# Patient Record
Sex: MALE | Race: BLACK OR AFRICAN AMERICAN | Employment: OTHER | URBAN - METROPOLITAN AREA
[De-identification: names, ages, dates, MRNs, and addresses within clinical notes are randomized per-mention and may not be internally consistent; named-entity substitution may affect disease eponyms.]

---

## 2017-04-12 ENCOUNTER — OFFICE VISIT (OUTPATIENT)
Dept: INTERNAL MEDICINE CLINIC | Age: 67
End: 2017-04-12

## 2017-04-12 VITALS
HEART RATE: 70 BPM | BODY MASS INDEX: 21.21 KG/M2 | WEIGHT: 132 LBS | HEIGHT: 66 IN | RESPIRATION RATE: 19 BRPM | TEMPERATURE: 98.2 F | DIASTOLIC BLOOD PRESSURE: 70 MMHG | OXYGEN SATURATION: 99 % | SYSTOLIC BLOOD PRESSURE: 130 MMHG

## 2017-04-12 DIAGNOSIS — I10 ESSENTIAL HYPERTENSION: ICD-10-CM

## 2017-04-12 DIAGNOSIS — Z12.11 COLON CANCER SCREENING: ICD-10-CM

## 2017-04-12 DIAGNOSIS — B20 HIV (HUMAN IMMUNODEFICIENCY VIRUS INFECTION) (HCC): ICD-10-CM

## 2017-04-12 DIAGNOSIS — G99.2 STENOSIS OF CERVICAL SPINE WITH MYELOPATHY (HCC): Primary | ICD-10-CM

## 2017-04-12 DIAGNOSIS — M48.02 STENOSIS OF CERVICAL SPINE WITH MYELOPATHY (HCC): Primary | ICD-10-CM

## 2017-04-12 LAB
CHOLEST SERPL-MCNC: 157 MG/DL
HDLC SERPL-MCNC: 78 MG/DL
LDL CHOLESTEROL POC: 64
NON-HDL GOAL (POC): 79
TCHOL/HDL RATIO (POC): 2
TRIGL SERPL-MCNC: 79 MG/DL

## 2017-04-12 RX ORDER — GABAPENTIN 300 MG/1
300 CAPSULE ORAL 3 TIMES DAILY
Qty: 90 CAP | Refills: 12 | Status: SHIPPED | OUTPATIENT
Start: 2017-04-12

## 2017-04-12 NOTE — MR AVS SNAPSHOT
Visit Information Date & Time Provider Department Dept. Phone Encounter #  
 4/12/2017 11:45 AM Jose Fox MD [facility] [phone] [id number] Follow-up Instructions Return in about 4 weeks (around 5/10/2017). Upcoming Health Maintenance Date Due  
 GLAUCOMA SCREENING Q2Y 11/22/2014 MEDICARE YEARLY EXAM 11/22/2014 INFLUENZA AGE 9 TO ADULT 8/1/2016 FOBT Q 1 YEAR AGE 50-75 5/6/2017 DTaP/Tdap/Td series (1 - Tdap) 4/26/2018* Pneumococcal 65+ High/Highest Risk (2 of 2 - PPSV23) 10/8/2017 *Topic was postponed. The date shown is not the original due date. Allergies as of 4/12/2017  Review Complete On: 4/12/2017 By: Jose Fox MD  
 No Known Allergies Current Immunizations  Reviewed on 12/5/2013 Name Date Influenza Vaccine 12/5/2013 Influenza Vaccine Split 10/8/2012 Pneumococcal Vaccine (Unspecified Type) 10/8/2012 Not reviewed this visit You Were Diagnosed With   
  
 Codes Comments Stenosis of cervical spine with myelopathy    -  Primary ICD-10-CM: M47.12 
ICD-9-CM: 721.1 HIV (human immunodeficiency virus infection) ([facility]     ICD-10-CM: M41 ICD-9-CM: V08 Essential hypertension     ICD-10-CM: I10 
ICD-9-CM: 401.9 Colon cancer screening     ICD-10-CM: Z12.11 ICD-9-CM: V76.51 Vitals BP Pulse Temp Resp Height(growth percentile) Weight(growth percentile) 130/70 70 98.2 °F (36.8 °C) (Oral) 19 5' 6\" (1.676 m) 132 lb (59.9 kg) SpO2 BMI Smoking Status 99% 21.31 kg/m2 Current Every Day Smoker BMI and BSA Data Body Mass Index Body Surface Area  
 21.31 kg/m 2 1.67 m 2 Preferred Pharmacy Pharmacy Name Phone [facility] [address] [facility] [phone] Your Updated Medication List  
  
   
This list is accurate as of: 4/12/17 12:46 PM.  Always use your most recent med list.  
  
  
  
  
 aspirin 81 mg chewable tablet Take 81 mg by mouth daily. b complex vitamins tablet Take 1 Tab by mouth daily. cholecalciferol 1,000 unit Cap Commonly known as:  VITAMIN D3 Take  by mouth daily. cyclobenzaprine 10 mg tablet Commonly known as:  FLEXERIL  
take 1 tablet by mouth twice a day if needed for muscle spasm FLUoxetine 10 mg capsule Commonly known as:  PROzac  
  
 fluPHENAZine decanoate 25 mg/mL injection Commonly known as:  PROLIXIN  
  
 * gabapentin 100 mg capsule Commonly known as:  NEURONTIN  
take 1 capsule by mouth three times a day * gabapentin 300 mg capsule Commonly known as:  NEURONTIN Take 1 Cap by mouth three (3) times daily. hydroCHLOROthiazide 25 mg tablet Commonly known as:  HYDRODIURIL  
take 1 tablet by mouth once daily  
  
 ibuprofen 800 mg tablet Commonly known as:  MOTRIN  
take 1 tablet by mouth twice a day with food  
  
 ondansetron 4 mg disintegrating tablet Commonly known as:  ZOFRAN ODT  
  
 oxyCODONE IR 5 mg immediate release tablet Commonly known as:  Nafisae [place] HC 2.5 % rectal cream  
Generic drug:  hydrocortisone  
insert rectally four times a day Senna 8.6 mg tablet Generic drug:  senna STOOL SOFTENER 100 mg capsule Generic drug:  docusate sodium * tamsulosin 0.4 mg capsule Commonly known as:  FLOMAX * tamsulosin 0.4 mg capsule Commonly known as:  FLOMAX Take 1 Cap by mouth daily. trihexyphenidyl 5 mg tablet Commonly known as:  ARTANE Take 5 mg by mouth three (3) times daily. * Elise Palma Commonly known as:  ULTRA-LIGHT ROLLATOR Use as directed daily * Elise Palma Commonly known as:  [address] Use as directed daily * Elise Palma Commonly known as:  [address] Use as needed * Notice:   This list has 7 medication(s) that are the same as other medications prescribed for you. Read the directions carefully, and ask your doctor or other care provider to review them with you. Prescriptions Sent to Pharmacy Refills  
 gabapentin (NEURONTIN) 300 mg capsule 12 Sig: Take 1 Cap by mouth three (3) times daily. Class: Normal  
 Pharmacy: [facility] [address] [place] [address] Drive  #: [phone] Route: Oral  
  
We Performed the Following AMB POC LIPID PROFILE [89604 CPT(R)] CBC W/O DIFF [88858 CPT(R)] METABOLIC PANEL, COMPREHENSIVE [07041 CPT(R)] OCCULT BLOOD, IMMUNOASSAY (FIT) [id number] CPT(R)] REFERRAL TO PHYSICAL THERAPY [VWB53 Custom] Follow-up Instructions Return in about 4 weeks (around 5/10/2017). Referral Information Referral ID Referred By Referred To  
  
 [id number] Jimenez MCDUFFIE Not Available Visits Status Start Date End Date 1 New Request 4/12/17 4/12/18 If your referral has a status of pending review or denied, additional information will be sent to support the outcome of this decision. Patient Instructions Click4Ride Activation Thank you for requesting access to Click4Ride. Please follow the instructions below to securely access and download your online medical record. Click4Ride allows you to send messages to your doctor, view your test results, renew your prescriptions, schedule appointments, and more. How Do I Sign Up? 1. In your internet browser, go to [email or web address] 
2. Click on the First Time User? Click Here link in the Sign In box. You will be redirect to the New Member Sign Up page. 3. Enter your Click4Ride Access Code exactly as it appears below. You will not need to use this code after youve completed the sign-up process. If you do not sign up before the expiration date, you must request a new code. Click4Ride Access Code: Activation code not generated Current Click4Ride Status: Patient Declined (This is the date your Click4Ride access code will ) 4. Enter the last four digits of your Social Security Number (xxxx) and Date of Birth (mm/dd/yyyy) as indicated and click Submit. You will be taken to the next sign-up page. 5. Create a Receptos ID. This will be your Receptos login ID and cannot be changed, so think of one that is secure and easy to remember. 6. Create a Receptos password. You can change your password at any time. 7. Enter your Password Reset Question and Answer. This can be used at a later time if you forget your password. 8. Enter your e-mail address. You will receive e-mail notification when new information is available in [address] 9. Click Sign Up. You can now view and download portions of your medical record. 10. Click the Download Summary menu link to download a portable copy of your medical information. Additional Information If you have questions, please visit the Frequently Asked Questions section of the Receptos website at [email or web address] Remember, Receptos is NOT to be used for urgent needs. For medical emergencies, dial 911. Please provide this summary of care documentation to your next provider. Your primary care clinician is listed as Thierry Davila. If you have any questions after today's visit, please call [phone].

## 2017-04-12 NOTE — PATIENT INSTRUCTIONS
[employer] Activation    Thank you for requesting access to [employer]. Please follow the instructions below to securely access and download your online medical record. Absolute [employer] allows you to send messages to your doctor, view your test results, renew your prescriptions, schedule appointments, and more. How Do I Sign Up? 1. In your internet browser, go to [email or web address]  2. Click on the First Time User? Click Here link in the Sign In box. You will be redirect to the New Member Sign Up page. 3. Enter your Absolute [employer] Access Code exactly as it appears below. You will not need to use this code after youve completed the sign-up process. If you do not sign up before the expiration date, you must request a new code. Absolute [employer]ody Access Code: Activation code not generated  Current Absolute [employer] Status: Patient Declined (This is the date your Absolute [employer]ody access code will )    4. Enter the last four digits of your Social Security Number (xxxx) and Date of Birth (mm/dd/yyyy) as indicated and click Submit. You will be taken to the next sign-up page. 5. Create a Absolute [employer]ody ID. This will be your Absolute [employer] login ID and cannot be changed, so think of one that is secure and easy to remember. 6. Create a Absolute [employer] password. You can change your password at any time. 7. Enter your Password Reset Question and Answer. This can be used at a later time if you forget your password. 8. Enter your e-mail address. You will receive e-mail notification when new information is available in [address] 9. Click Sign Up. You can now view and download portions of your medical record. 10. Click the Download Summary menu link to download a portable copy of your medical information. Additional Information    If you have questions, please visit the Frequently Asked Questions section of the Absolute Antibody website at [email or web address] Remember, Absolute [employer] is NOT to be used for urgent needs. For medical emergencies, dial 911.

## 2017-04-12 NOTE — PROGRESS NOTES
Izaiah Banda is a 78 y.o. male and presents with Hypertension; Gait Problem; and HIV  . Subjective:    Hypertension Review:  The patient has hypertension  Diet and Lifestyle: generally follows a  low sodium diet, exercises sporadically  Home BP Monitoring: is not measured at home. Pertinent ROS: taking medications as instructed, no medication side effects noted, no TIA's, no chest pain on exertion, no dyspnea on exertion, no swelling of ankles. He recently had cervical disc surgery    He has a history of hiv. Review of Systems  Constitutional: negative for fevers, chills, anorexia and weight loss  Eyes:   negative for visual disturbance and irritation  ENT:   negative for tinnitus,sore throat,nasal congestion,ear pains. hoarseness  Respiratory:  negative for cough, hemoptysis, dyspnea,wheezing  CV:   negative for chest pain, palpitations, lower extremity edema  GI:   negative for nausea, vomiting, diarrhea, abdominal pain,melena  Endo:               negative for polyuria,polydipsia,polyphagia,heat intolerance  Genitourinary: negative for frequency, dysuria and hematuria  Integument:  negative for rash and pruritus  Hematologic:  negative for easy bruising and gum/nose bleeding  Musculoskel: myalgias, arthralgias,  joint pain  Neurological:  negative for headaches, dizziness, vertigo, memory problems and gait   Behavl/Psych: negative for feelings of anxiety, depression, mood changes    Past Medical History:   Diagnosis Date    Chronic hepatitis C without mention of hepatic coma 4/7/2011    Degenerative Joint Disease 7/13/2011    Hematochezia 4/7/2011    [facility] 4/7/2011    Hypetension 4/7/2011    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 arm surgery due to abscess    HX OTHER SURGICAL      flank mole removal     Social History     Social History    Marital status: SINGLE     Spouse name: N/A    Number of children: N/A    Years of education: N/A     Social History Main Topics    Smoking status: Current Every Day Smoker     Packs/day: 0.25    Smokeless tobacco: Never Used    Alcohol use No    Drug use: No    Sexual activity: Not Asked     Other Topics Concern    None     Social History Narrative     Family History   Problem Relation Age of Onset    Kidney Disease Mother     Diabetes Mother     No Known Problems Father     Heart Disease Sister     Kidney Disease Sister     Diabetes Brother     Heart Disease Brother     Cancer Brother     Alcohol abuse Brother     Obesity Brother     Other Brother      Drugs     Current Outpatient Prescriptions   Medication Sig Dispense Refill    gabapentin (NEURONTIN) 300 mg capsule Take 1 Cap by mouth three (3) times daily. 90 Cap 12    tamsulosin (FLOMAX) 0.4 mg capsule       SENNA 8.6 mg tablet       fluPHENAZine decanoate (PROLIXIN) 25 mg/mL injection       cyclobenzaprine (FLEXERIL) 10 mg tablet take 1 tablet by mouth twice a day if needed for muscle spasm 90 Tab 3    Walker (MAYURI ROLLING WALKER) misc Use as needed 1 Each 0    ibuprofen (MOTRIN) 800 mg tablet take 1 tablet by mouth twice a day with food 60 Tab 12    gabapentin (NEURONTIN) 100 mg capsule take 1 capsule by mouth three times a day 90 Cap 12    PROCTOSOL HC 2.5 % rectal cream insert rectally four times a day 28.35 g 12    hydrochlorothiazide (HYDRODIURIL) 25 mg tablet take 1 tablet by mouth once daily 30 Tab 12    Walker (MAYURI ROLLING WALKER) misc Use as directed daily 1 Each 0    Walker (ULTRA-LIGHT ROLLATOR) misc Use as directed daily 1 Each 0    b complex vitamins tablet Take 1 Tab by mouth daily.  cholecalciferol (VITAMIN D3) 1,000 unit cap Take  by mouth daily.       FLUoxetine (PROZAC) 10 mg capsule   0    trihexyphenidyl (ARTANE) 5 mg tablet Take 5 mg by mouth three (3) times daily.  STOOL SOFTENER 100 mg capsule       oxyCODONE IR (ROXICODONE) 5 mg immediate release tablet       ondansetron (ZOFRAN ODT) 4 mg disintegrating tablet       tamsulosin (FLOMAX) 0.4 mg capsule Take 1 Cap by mouth daily. 30 Cap 12    aspirin 81 mg chewable tablet Take 81 mg by mouth daily. No Known Allergies    Objective:  Visit Vitals    /70    Pulse 70    Temp 98.2 °F (36.8 °C) (Oral)    Resp 19    Ht 5' 6\" (1.676 m)    Wt 132 lb (59.9 kg)    SpO2 99%    BMI 21.31 kg/m2     Physical Exam:   General appearance - alert, well appearing, and in no distress  Mental status - alert, oriented to person, place, and time  EYE-SHANNAN, EOMI, corneas normal, no foreign bodies  ENT-ENT exam normal, no neck nodes or sinus tenderness  Nose - normal and patent, no erythema, discharge or polyps  Mouth - mucous membranes moist, pharynx normal without lesions  Neck - supple, no significant adenopathy   Chest - clear to auscultation, no wheezes, rales or rhonchi, symmetric air entry   Heart - normal rate, regular rhythm, normal S1, S2, no murmurs, rubs, clicks or gallops   Abdomen - soft, nontender, nondistended, no masses or organomegaly  Lymph- no adenopathy palpable  Ext-peripheral pulses normal, no pedal edema, no clubbing or cyanosis  Skin-Warm and dry.  no hyperpigmentation, vitiligo, or suspicious lesions  Neuro -alert, oriented, normal speech, no focal findings or movement disorder noted  Neck-normal C-spine, no tenderness, full ROM without pain      Results for orders placed or performed in visit on 05/06/16   OCCULT BLOOD, STOOL   Result Value Ref Range    Occult Blood fecal, by IA Positive (A) Negative   AMB POC LIPID PROFILE   Result Value Ref Range    Cholesterol (POC) 164     Triglycerides (POC) 114     HDL Cholesterol (POC) 67     LDL Cholesterol (POC) 75     Non-HDL Goal (POC) 97     TChol/HDL Ratio (POC) 2.5        Assessment/Plan:    ICD-10-CM ICD-9-CM    1. Stenosis of cervical spine with myelopathy M47.12 721.1 REFERRAL TO PHYSICAL THERAPY   2. HIV (human immunodeficiency virus infection) ([facility] Z21 V08    3. Essential hypertension I10 401.9 CBC W/O DIFF      METABOLIC PANEL, COMPREHENSIVE      AMB POC LIPID PROFILE   4. Colon cancer screening Z12.11 V76.51 OCCULT BLOOD, IMMUNOASSAY (FIT)     Orders Placed This Encounter    OCCULT BLOOD, IMMUNOASSAY (FIT)    CBC W/O DIFF    METABOLIC PANEL, COMPREHENSIVE    REFERRAL TO PHYSICAL THERAPY     Referral Priority:   Routine     Referral Type:   PT/OT/ST     Referral Reason:   Specialty Services Required    AMB POC LIPID PROFILE    gabapentin (NEURONTIN) 300 mg capsule     Sig: Take 1 Cap by mouth three (3) times daily. Dispense:  90 Cap     Refill:  12     lose weight, increase physical activity, follow low fat diet, follow low salt diet,Take 81mg aspirin daily  Patient Instructions   [employer] Activation    Thank you for requesting access to [employer]. Please follow the instructions below to securely access and download your online medical record. [employer] allows you to send messages to your doctor, view your test results, renew your prescriptions, schedule appointments, and more. How Do I Sign Up? 1. In your internet browser, go to [email or web address]  2. Click on the First Time User? Click Here link in the Sign In box. You will be redirect to the New Member Sign Up page. 3. Enter your [employer] Access Code exactly as it appears below. You will not need to use this code after youve completed the sign-up process. If you do not sign up before the expiration date, you must request a new code. [employer] Access Code: Activation code not generated  Current [employer] Status: Patient Declined (This is the date your [employer] access code will )    4.  Enter the last four digits of your Social Security Number (xxxx) and Date of Birth (mm/dd/yyyy) as indicated and click Submit. You will be taken to the next sign-up page. 5. Create a [employer] ID. This will be your [employer] login ID and cannot be changed, so think of one that is secure and easy to remember. 6. Create a [employer] password. You can change your password at any time. 7. Enter your Password Reset Question and Answer. This can be used at a later time if you forget your password. 8. Enter your e-mail address. You will receive e-mail notification when new information is available in [address] 9. Click Sign Up. You can now view and download portions of your medical record. 10. Click the Download Summary menu link to download a portable copy of your medical information. Additional Information    If you have questions, please visit the Frequently Asked Questions section of the [employer] website at [email or web address] Remember, [employer] is NOT to be used for urgent needs. For medical emergencies, dial 911. Follow-up Disposition:  Return in about 4 weeks (around 5/10/2017). I have reviewed with the patient details of the assessment and plan and all questions were answered. Relevent patient education was performed    An After Visit Summary was printed and given to the patient.

## 2017-04-13 LAB
ALBUMIN SERPL-MCNC: 3.9 G/DL (ref 3.6–4.8)
ALBUMIN/GLOB SERPL: 1.2 {RATIO} (ref 1.2–2.2)
ALP SERPL-CCNC: 79 IU/L (ref 39–117)
ALT SERPL-CCNC: 29 IU/L (ref 0–44)
AST SERPL-CCNC: 33 IU/L (ref 0–40)
BILIRUB SERPL-MCNC: 0.3 MG/DL (ref 0–1.2)
BUN SERPL-MCNC: 10 MG/DL (ref 8–27)
BUN/CREAT SERPL: 12 (ref 10–24)
CALCIUM SERPL-MCNC: 9.6 MG/DL (ref 8.6–10.2)
CHLORIDE SERPL-SCNC: 99 MMOL/L (ref 96–106)
CO2 SERPL-SCNC: 28 MMOL/L (ref 18–29)
CREAT SERPL-MCNC: 0.81 MG/DL (ref 0.76–1.27)
ERYTHROCYTE [DISTWIDTH] IN BLOOD BY AUTOMATED COUNT: 13.9 % (ref 12.3–15.4)
GLOBULIN SER CALC-MCNC: 3.2 G/DL (ref 1.5–4.5)
GLUCOSE SERPL-MCNC: 89 MG/DL (ref 65–99)
HCT VFR BLD AUTO: 41.5 % (ref 37.5–51)
HGB BLD-MCNC: 14.1 G/DL (ref 12.6–17.7)
MCH RBC QN AUTO: 30.3 PG (ref 26.6–33)
MCHC RBC AUTO-ENTMCNC: 34 G/DL (ref 31.5–35.7)
MCV RBC AUTO: 89 FL (ref 79–97)
NRBC BLD AUTO-RTO: 0 %
PLATELET # BLD AUTO: 304 X10E3/UL (ref 150–379)
POTASSIUM SERPL-SCNC: 4.9 MMOL/L (ref 3.5–5.2)
PROT SERPL-MCNC: 7.1 G/DL (ref 6–8.5)
RBC # BLD AUTO: 4.66 X10E6/UL (ref 4.14–5.8)
SODIUM SERPL-SCNC: 141 MMOL/L (ref 134–144)
WBC # BLD AUTO: 4.1 X10E3/UL (ref 3.4–10.8)

## 2017-04-19 ENCOUNTER — HOSPITAL ENCOUNTER (OUTPATIENT)
Dept: PHYSICAL THERAPY | Age: 67
Discharge: HOME | End: 2017-04-19
Payer: MEDICAID

## 2017-04-19 PROCEDURE — G8979 MOBILITY GOAL STATUS: HCPCS

## 2017-04-19 PROCEDURE — 97110 THERAPEUTIC EXERCISES: CPT

## 2017-04-19 PROCEDURE — 97161 PT EVAL LOW COMPLEX 20 MIN: CPT

## 2017-04-19 PROCEDURE — G8978 MOBILITY CURRENT STATUS: HCPCS

## 2017-04-19 NOTE — PROGRESS NOTES
[facility]  [address], [address] [facility], [place]    OUTPATIENT physical Therapy  []      Initial Evaluation []     30 day/10th visit progress note []     90 day/re-certification    NAME: Roby Salamanca AGE: 78 y.oMerissa GENDER: male  DATE: 4/19/2017  REFERRING PHYSICIAN: Deborah Barkley MD    OBJECTIVE DATA SUMMARY:   Medical Diagnosis: Cx stenosis with myelopathy  PT Diagnosis: Decreased ROM, LE weakness  Date of Onset: ongoing  Mechanism of Injury/Chief Complaint:  No recent injury  Present Symptoms:Cervical pain, limited motion. Complaints of falling  Functional Deficits and Limitations:   []     Sitting:   []    Dressing:   []    Reaching:  []     Standing:   []     Bathing:   []    Lifting:  [x]     Walking:   []     Cooking:   []    Yardwork:  []     Sleeping:   []     Cleaning:   []     Driving:  []     Work Tasks:  []     Recreation:   []    Other:    HISTORY:  Past Medical History:   Past Medical History:   Diagnosis Date    Chronic hepatitis C without mention of hepatic coma 4/7/2011    Degenerative Joint Disease 7/13/2011    Hematochezia 4/7/2011    HIV (human immunodeficiency virus infection) ([facility] [address] 4/7/2011    Hypetension 4/7/2011    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  repots that pt had cx surgery in November at [facility]  Will bring paper work about what his surgery entailed, does not think he had fusion  Patients goals for therapy: less neck pain, fewer falls    OBJECTIVE DATA SUMMARY:   EXAMINATION/PRESENTATION/DECISION MAKING:   Pain:  Location: neck and upper back  Quality: sharp  Now: 10/10  Best:  Worst:  Factors that improve pain: rest    Posture:   Pt with flexed trunk    Strength:   B/l LE 3/5    Range of Motion:   Cx ROM limited to 75%, flex/ext and rotation    Spinal Assessment:   Flattened lumbar spine      Joint Mobility:   Cx mobility not assessed will await dr Irma Yang on surgery performed    Palpation:   TTP b/l UT, rhomboids, levator, cx paraspinals    Neurologic Assessment:   Tone: increased LE tone   Sensation: WFL   Reflexes: not tested    Special Tests:   None today    Mobility:   Transitional Movements: unsteady    Gait: Pt ambulates with walker, scissor gait with toe walking    Balance:  Poor, improved with r/w    Functional Measure: In compliance with CMSs Claims Based Outcome Reporting, the following G-code set was chosen for this patient based on their primary functional limitation being treated: The outcome measure chosen to determine the severity of the functional limitation was the LEFS with a score of 33/80 which was correlated with the impairment scale.     ? Mobility - Walking and Moving Around:     - CURRENT STATUS: CK - 40%-59% impaired, limited or restricted    - GOAL STATUS: CJ - 20%-39% impaired, limited or restricted    - D/C STATUS:  ---------------To be determined---------------      Physical Therapy Evaluation Charge Determination   History Examination Presentation Decision-Making   MEDIUM  Complexity : 1-2 comorbidities / personal factors will impact the outcome/ POC  LOW Complexity : 1-2 Standardized tests and measures addressing body structure, function, activity limitation and / or participation in recreation  LOW Complexity : Stable, uncomplicated  LOW Complexity : FOTO score of       Based on the above components, the patient evaluation is determined to be of the following complexity level: LOW     TREATMENT/INTERVENTION:  Modalities (Rationale): MHP post treatment to neck to decrease pain and muscle guarding      Therapeutic Exercises:  HEP  Isometric Cx extension  Cx rotation stretch  Shoulder blade squeeze  Hip abduction  SLR  Shallow knee bends in walker  Heel/toe raises    Manual Therapy:  STM Cx paraspinals and UT    Neuro Re-Education:  Discussed balance challenges to decrease falls    Balance Training:  EC NBOS, perturbations  SLS x 5 sec alternating, 1 hand for balance  EC SLS    Ambulation/Gait Training:  None today, future sessions focus on giat and balance challenge    Activity tolerance and post treatment pain report:   Fair tolerance, fatigues quickly  Education:  []     Home exercise program provided. Education was provided to the patient on the following topics: importance of doing exercises x2 per day. Patient verbalized understanding of the topics presented. ASSESSMENT:   Nette Perez is a 78 y.o. male who presents with poor balance and Cx pain. Physical therapy problems based on objective data include: decrease ROM, decrease strength, decrease ADL/ functional abilitiies and decrease activity tolerance . Patient will benefit from skilled intervention to address these impairments. Rehabilitation potential is considered to be Fair. Factors which may influence rehabilitation potential include chronicity of problem . Patient will benefit from 10 physical therapy visits over 8 weeks to optimize improvement in these areas.     PLAN OF CARE:   Recommendations and Planned Interventions:  [x]     Therapeutic Activities  [x]     Heat/Ice  [x]     Therapeutic Exercises  []     Ultrasound  []     Gait training  [x]     E-stim  [x]     Balance training  [x]     Home exercise program  []     Manual Therapy  []     TENS  [x]     Neuro Re-Ed  []     Edema management  [x]     Posture/Biomechanics  [x]     Pain management  []     Traction  []     Other:    Frequency/Duration:  Patient will be followed by physical therapy 1 time a week for 8 weeks to address goals. GOALS  Short term goals  Time frame: 5 weeks  1. Patient will be compliance and independent with the initial HEP as evidenced by being able to perform without cuing. 2. Patient tolerate 15 minutes of clinic exercise. 3. Report of fewer than 1 fall / week. 4. Pt will report decrease in neck pain 4/10  Long term goals  Time frame: 10 weeks  1. Patient will reports pain level decreased to 2/10 to allow increased ease of movement. 2. Patient will have an improved score on the LEFS outcome measure by 10 points to demonstrate an increase in functional activity tolerance. 3. Patient will be independent in final individualized HEP. 4.Pt  report of fewer than 1 fall/ month       [x]     Patient has participated in goal setting and agrees to work toward plan of care. []     Patient was instructed to call if questions or concerns arise. Thank you for this referral.  Ally Painter, PT       Patient Time in clinic:          TREATMENT PLAN EFFECTIVE DATES:   4/19/2017 TO 6/21/2017  I have read the above plan of care for Maverick Jacob and certify the need for skilled physical therapy services.     Physician Signature: ____________________________________________________    Date: _________________________________________________________________

## 2017-04-26 ENCOUNTER — HOSPITAL ENCOUNTER (OUTPATIENT)
Dept: PHYSICAL THERAPY | Age: 67
Discharge: HOME | End: 2017-04-26
Payer: MEDICAID

## 2017-04-26 PROCEDURE — 97110 THERAPEUTIC EXERCISES: CPT

## 2017-04-26 PROCEDURE — 97112 NEUROMUSCULAR REEDUCATION: CPT

## 2017-04-26 NOTE — PROGRESS NOTES
[facility]  [address], [address] [facility]    OUTPATIENT physical Therapy DAILY Treatment NOTe  Visit: 2    NAME: Roby Salamanca AGE: 78 y.o. GENDER: male  DATE: 4/26/2017  REFERRING PHYSICIAN: Deborah Barkley MD          GOALS  Short term goals  Time frame: 5 weeks  1. Patient will be compliance and independent with the initial HEP as evidenced by being able to perform without cuing. 2. Patient tolerate 15 minutes of clinic exercise. 3. Report of fewer than 1 fall / week. 4. Pt will report decrease in neck pain 4/10  Long term goals  Time frame: 10 weeks  1. Patient will reports pain level decreased to 2/10 to allow increased ease of movement. 2. Patient will have an improved score on the LEFS outcome measure by 10 points to demonstrate an increase in functional activity tolerance. 3. Patient will be independent in final individualized HEP. 4.Pt  report of fewer than 1 fall/ month                  SUBJECTIVE:   Pt reports no neck pain. No falls but continues to wait for his walker. OBJECTIVE DATA SUMMARY:     Pain:  Location: neck and upper back  Quality: sharp  Now: 10/10  Best:  Worst:  Factors that improve pain: rest    Posture:   Pt with flexed trunk    Strength:   B/l LE 3/5    Range of Motion:   Cx ROM limited to 75%, flex/ext and rotation    Spinal Assessment:   Flattened lumbar spine      Joint Mobility:   Cx mobility not assessed will await dr boston on surgery performed    Palpation:   TTP b/l UT, rhomboids, levator, cx paraspinals    Neurologic Assessment:   Tone: increased LE tone   Sensation: WFL   Reflexes: not tested    Special Tests:   None today    Mobility:   Transitional Movements: unsteady    Gait: Pt ambulates with walker, scissor gait with toe walking    Balance:  Poor, improved with r/w    Functional Measure:      In compliance with CMSs Claims Based Outcome Reporting, the following G-code set was chosen for this patient based on their primary functional limitation being treated: The outcome measure chosen to determine the severity of the functional limitation was the LEFS with a score of 33/80 which was correlated with the impairment scale. ? Mobility - Walking and Moving Around:     - CURRENT STATUS: CK - 40%-59% impaired, limited or restricted    - GOAL STATUS: CJ - 20%-39% impaired, limited or restricted    - D/C STATUS:  ---------------To be determined---------------      Physical Therapy Evaluation Charge Determination   History Examination Presentation Decision-Making   MEDIUM  Complexity : 1-2 comorbidities / personal factors will impact the outcome/ POC  LOW Complexity : 1-2 Standardized tests and measures addressing body structure, function, activity limitation and / or participation in recreation  LOW Complexity : Stable, uncomplicated  LOW Complexity : FOTO score of       Based on the above components, the patient evaluation is determined to be of the following complexity level: LOW     TREATMENT/INTERVENTION:  Modalities (Rationale): MHP post treatment to neck to decrease pain and muscle guarding      Therapeutic Exercises:  HEP  Isometric Cx extension  Cx rotation stretch  Shoulder blade squeeze  Hip abduction  SLR  Shallow knee bends in walker  Heel/toe raises    Additional Clinic activities    Steamboats  Shallow knee bends    Seated   LAQ x 10 reps with 2#  Marching in place 2#    Pull-downs at [employer] x 10 reps 15#  Pull backs                    X 10         15#      Manual Therapy:  STM Cx paraspinals and UT    Neuro Re-Education:  Discussed balance challenges to decrease falls    Balance Training:  EC NBOS, perturbations  SLS x 5 sec alternating, 1 hand for balance  EC SLS    SLS on green foam    Ambulation/Gait Training:  Pt ambulated, emphasized keeping r/w close to him,     Activity tolerance and post treatment pain report:    Tolerated fair, fatigued at end of session    Education:  Education was provided to the patient on the following topics: Safe use of r/w. [x]    No changes were made to the home exercise program.  []    The following changes were made to the home exercise program:   Patient verbalized understanding of the topics presented. ASSESSMENT:   Pt returns today following IE. He reports no neck pain and his ROM is WNL. Pt post cervical discotomy in November. Continued to focus on exercises for UE and LE strengthening following surgery as well as neuromuscular and balance challenges. Patients progression toward goals is as follows:  []     Improving appropriately and progressing toward goals  [x]     Improving slowly and progressing toward goals  []     Not making progress toward goals and plan of care will be adjusted    PLAN OF CARE:   Patient continues to benefit from skilled intervention to address the above impairments. [x]    Continue treatment per established plan of care.   []     Recommend the following changes to the plan of care:     Recommendations/Intent for next treatment: Increase neuromuscular challenges Proximal    Mirella Labrum, PT     Patient Time in clinic:

## 2017-05-05 ENCOUNTER — HOSPITAL ENCOUNTER (OUTPATIENT)
Dept: PHYSICAL THERAPY | Age: 67
Discharge: HOME | End: 2017-05-05
Payer: MEDICAID

## 2017-05-05 PROCEDURE — 97110 THERAPEUTIC EXERCISES: CPT | Performed by: PHYSICAL THERAPIST

## 2017-05-05 NOTE — PROGRESS NOTES
[facility]  [address], [address] [facility]    OUTPATIENT physical Therapy DAILY Treatment NOTe  Visit: 3    NAME: Win Gramajo AGE: 78 y.o. GENDER: male  DATE: 5/5/2017  REFERRING PHYSICIAN: Lorraine Johnson MD        GOALS  Short term goals  Time frame: 5 weeks  1. Patient will be compliance and independent with the initial HEP as evidenced by being able to perform without cuing. 2. Patient tolerate 15 minutes of clinic exercise. 3. Report of fewer than 1 fall / week. 4. Patient will report decrease in neck pain 4/10    Long term goals  Time frame: 10 weeks  1. Patient will reports pain level decreased to 2/10 to allow increased ease of movement. 2. Patient will have an improved score on the LEFS outcome measure by 10 points to demonstrate an increase in functional activity tolerance. 3. Patient will be independent in final individualized HEP. 4.Patient will report fewer than 1 fall/ month            SUBJECTIVE:   \"My neck is hurting today. It always hurts more when it's raining. \"    Patient reports moderate pain in neck (unable to provide number) and fall earlier this week.     OBJECTIVE DATA SUMMARY:   Objective data from initial evaluation:  Pain:  Location: neck and upper back  Quality: sharp  Now: 10/10  Factors that improve pain: rest    Posture:   Pt with flexed trunk    Strength:   B/l LE 3/5    Range of Motion:   Cx ROM limited to 75%, flex/ext and rotation    Spinal Assessment:   Flattened lumbar spine    Joint Mobility:   Cx mobility not assessed will await dr boston on surgery performed    Palpation:   TTP b/l UT, rhomboids, levator, cx paraspinals    Neurologic Assessment:   Tone: increased LE tone   Sensation: WFL   Reflexes: not tested    Mobility:   Transitional Movements: unsteady    Gait: Pt ambulates with walker, scissor gait with toe walking    Balance:  Poor, improved with r/w    Functional Measure:   LEFS: 33/80 TREATMENT/INTERVENTION:  Modalities (Rationale):  to decrease pain and muscle guarding  MHP to cervical spine for 10 minutes post treatment     Therapeutic Exercises:  HEP provided on evaluation:  Isometric Cx extension, Cx rotation stretch, Shoulder blade squeeze,   Hip abduction ,SLR, Shallow knee bends in walker, Heel/toe raises    Exercises in BOLD performed this date:    Standing:  Steamboats (hip abduction and extension): 10 reps B  Marches: 10 reps B  Mini squats: 10 reps    Supine:   SLR: 10 reps B  Chin tucks: 10 reps     Seated:   LAQ x 10 reps with 2#  Marching in place 2#  C/s SB with gentle overpressure: 3 reps with 15 second holds  Pec stretch (manual): 3 reps with 10 second holds    Pull-downs at Quantum 15#: 10 reps  Pull backs 15#: 10 reps    Manual Therapy:  Gentle STM to c/s paraspinals and UT     Neuro Re-Education:  Discussed balance challenges to decrease falls    Balance Training:  EC NBOS, perturbations  SLS x 5 sec alternating, 1 hand for balance  EC SLS    SLS on green foam    Ambulation/Gait Training:  Pt ambulated, emphasized keeping r/w close to him,      Activity tolerance and post treatment pain report: Tolerated fair, fatigued at end of session     Education:  Education was provided to the patient on the following topics: Safe use of r/w. [x]    No changes were made to the home exercise program.  []    The following changes were made to the home exercise program:   Patient verbalized understanding of the topics presented. ASSESSMENT:   Patient presents with moderate pain in neck this date. Patient with tenderness to palpation at UT and rhomboids. Patient s/p cervical discotomy in November 2016. Patient presents with new rolling walker this date; patient reports fall earlier this week. Patient with increased fatigue during today's session which limited exercises.      Patients progression toward goals is as follows:  []     Improving appropriately and progressing toward goals  [x]     Improving slowly and progressing toward goals  []     Not making progress toward goals and plan of care will be adjusted    PLAN OF CARE:   Patient continues to benefit from skilled intervention to address the above impairments. [x]    Continue treatment per established plan of care.   []     Recommend the following changes to the plan of care:     Recommendations/Intent for next treatment: Increase neuromuscular challenges     Marya Pisano, PT   Time Calculation: 35 mins  Patient Time in clinic:   Start Time: 1048   Stop Time: [id number]

## 2017-05-10 ENCOUNTER — HOSPITAL ENCOUNTER (OUTPATIENT)
Dept: PHYSICAL THERAPY | Age: 67
Discharge: HOME | End: 2017-05-10
Payer: MEDICAID

## 2017-05-10 PROCEDURE — 97110 THERAPEUTIC EXERCISES: CPT

## 2017-05-10 PROCEDURE — 97112 NEUROMUSCULAR REEDUCATION: CPT

## 2017-05-10 NOTE — PROGRESS NOTES
[facility]  [address], [address] [facility]    OUTPATIENT physical Therapy DAILY Treatment NOTe  Visit: 4    NAME: Abbe Clements AGE: 78 y.o. GENDER: male  DATE: 5/10/2017  REFERRING PHYSICIAN: Garret Dueñas MD        GOALS  Short term goals  Time frame: 5 weeks  1. Patient will be compliance and independent with the initial HEP as evidenced by being able to perform without cuing. 2. Patient tolerate 15 minutes of clinic exercise. 3. Report of fewer than 1 fall / week. 4. Patient will report decrease in neck pain 4/10    Long term goals  Time frame: 10 weeks  1. Patient will reports pain level decreased to 2/10 to allow increased ease of movement. 2. Patient will have an improved score on the LEFS outcome measure by 10 points to demonstrate an increase in functional activity tolerance. 3. Patient will be independent in final individualized HEP. 4.Patient will report fewer than 1 fall/ month            SUBJECTIVE:   \"My neck is hurting today. It always hurts more when it's raining. \"    Patient reports moderate pain in neck (unable to provide number) and fall earlier this week.     OBJECTIVE DATA SUMMARY:   Objective data from initial evaluation:  Pain:  Location: neck and upper back  Quality: sharp  Now: 10/10  Factors that improve pain: rest    Posture:   Pt with flexed trunk    Strength:   B/l LE 3/5    Range of Motion:   Cx ROM limited to 75%, flex/ext and rotation    Spinal Assessment:   Flattened lumbar spine    Joint Mobility:   Cx mobility not assessed will await dr boston on surgery performed    Palpation:   TTP b/l UT, rhomboids, levator, cx paraspinals    Neurologic Assessment:   Tone: increased LE tone   Sensation: WFL   Reflexes: not tested    Mobility:   Transitional Movements: unsteady    Gait: Pt ambulates with walker, scissor gait with toe walking    Balance:  Poor, improved with r/w    Functional Measure:   LEFS: 33/80 TREATMENT/INTERVENTION:  Modalities (Rationale):  to decrease pain and muscle guarding  MHP to cervical spine for 10 minutes post treatment     Therapeutic Exercises:  HEP provided on evaluation:  Isometric Cx extension, Cx rotation stretch, Shoulder blade squeeze,   Hip abduction ,SLR, Shallow knee bends in walker, Heel/toe raises    Exercises in BOLD performed this date:    Standing:  Steamboats (hip abduction and extension): 10 reps B  Marches: 10 reps B  Mini squats: 10 reps  Toe raise/heel raise    Supine:   SLR: 10 reps B  Chin tucks: 10 reps   BKTC on yellow theraball  HS passive x 5 reps  Clamshells x 10 reps 2 sets    Seated:   LAQ x 10 reps with 2#  Marching in place 2#  Adductor squeeze x 10 reps 2 sets  Abduction against red TB x 20 reps  Toe taps on cones x 10 reps    C/s SB with gentle overpressure: 3 reps with 15 second holds  Pec stretch (manual): 3 reps with 10 second holds    Pull-downs at [employer] 15#: 10 reps  Pull backs 15#: 10 reps    Manual Therapy:  Gentle STM to c/s paraspinals and UT     Neuro Re-Education:  Discussed balance challenges to decrease falls    Balance Training:  EC NBOS, perturbations  SLS x 5 sec alternating, 1 hand for balance  EC SLS    SLS on green foam    Ambulation/Gait Training:  Pt ambulated, emphasized keeping r/w close to him,      Activity tolerance and post treatment pain report: Tolerated fair, fatigued at end of session     Education:  Education was provided to the patient on the following topics: Safe use of r/w. [x]    No changes were made to the home exercise program.  []    The following changes were made to the home exercise program:   Patient verbalized understanding of the topics presented. ASSESSMENT:    Patient with tenderness to palpation at UT and rhomboids. Patient s/p cervical discotomy in November 2016. Patient presents with new rolling walker this date, however reports a fall earlier this week.  Progressed balance and clinic exercises today with good tolerance. Patients progression toward goals is as follows:  []     Improving appropriately and progressing toward goals  [x]     Improving slowly and progressing toward goals  []     Not making progress toward goals and plan of care will be adjusted    PLAN OF CARE:   Patient continues to benefit from skilled intervention to address the above impairments. [x]    Continue treatment per established plan of care.   []     Recommend the following changes to the plan of care:     Recommendations/Intent for next treatment: Increase neuromuscular challenges     Ramy Palafox, PT   Time Calculation: 55 mins  Patient Time in clinic:   Start Time: 7293   Stop Time: [id number]

## 2017-05-17 ENCOUNTER — HOSPITAL ENCOUNTER (OUTPATIENT)
Dept: PHYSICAL THERAPY | Age: 67
Discharge: HOME | End: 2017-05-17
Payer: MEDICAID

## 2017-05-17 LAB
HEMOCCULT STL QL IA: NEGATIVE
PLEASE NOTE:, 188601: NORMAL

## 2017-05-17 PROCEDURE — 97110 THERAPEUTIC EXERCISES: CPT

## 2017-05-17 PROCEDURE — 97116 GAIT TRAINING THERAPY: CPT

## 2017-05-17 NOTE — PROGRESS NOTES
[facility]  [address], [address] [facility]    OUTPATIENT physical Therapy DAILY Treatment NOTe  Visit: 5    NAME: Nette Perez AGE: 78 y.o. GENDER: male  DATE: 5/17/2017  REFERRING PHYSICIAN: Finn Negrete MD        GOALS  Short term goals  Time frame: 5 weeks  1. Patient will be compliance and independent with the initial HEP as evidenced by being able to perform without cuing. 2. Patient tolerate 15 minutes of clinic exercise. 3. Report of fewer than 1 fall / week. 4. Patient will report decrease in neck pain 4/10    Long term goals  Time frame: 10 weeks  1. Patient will reports pain level decreased to 2/10 to allow increased ease of movement. 2. Patient will have an improved score on the LEFS outcome measure by 10 points to demonstrate an increase in functional activity tolerance. 3. Patient will be independent in final individualized HEP.   4.Patient will report fewer than 1 fall/ month            SUBJECTIVE:   \"Pt reports falling' I landed on my bottom, I didn't hit my head\"        OBJECTIVE DATA SUMMARY:   Objective data from initial evaluation:  Pain:  Location: neck and upper back  Quality: sharp  Now: 10/10  Factors that improve pain: rest    Posture:   Pt with flexed trunk    Strength:   B/l LE 3/5    Range of Motion:   Cx ROM limited to 75%, flex/ext and rotation    Spinal Assessment:   Flattened lumbar spine    Joint Mobility:   Cx mobility not assessed will await dr Edwin Garibay on surgery performed    Palpation:   TTP b/l UT, rhomboids, levator, cx paraspinals    Neurologic Assessment:   Tone: increased LE tone   Sensation: WFL   Reflexes: not tested    Mobility:   Transitional Movements: unsteady    Gait: Pt ambulates with walker, scissor gait with toe walking    Balance:  Poor, improved with r/w    Functional Measure:   LEFS: 33/80     TREATMENT/INTERVENTION:  Modalities (Rationale):  to decrease pain and muscle guarding  MHP to cervical spine for 10 minutes post treatment     Therapeutic Exercises:  HEP provided on evaluation:  Isometric Cx extension, Cx rotation stretch, Shoulder blade squeeze,   Hip abduction ,SLR, Shallow knee bends in walker, Heel/toe raises    Exercises in BOLD performed this date:    Standing:  Steamboats (hip abduction and extension): 10 reps B  Marches: 10 reps B  Mini squats: 10 reps  Toe raise/heel raise    Supine:   SLR: 10 reps B  Chin tucks: 10 reps   BKTC on yellow theraball  HS passive x 5 reps  Clamshells x 10 reps 2 sets    Seated:   LAQ x 10 reps with 2#  Marching in place 2#  Adductor squeeze x 10 reps 2 sets  Abduction against red TB x 20 reps  Toe taps on cones x 10 reps    C/s SB with gentle overpressure: 3 reps with 15 second holds  Pec stretch (manual): 3 reps with 10 second holds    Pull-downs at Quantum 15#: 10 reps  Pull backs 15#: 10 reps    Manual Therapy:  Gentle STM to c/s paraspinals and UT     Neuro Re-Education:  Discussed balance challenges to decrease falls    Balance Training:  EC NBOS, perturbations  SLS x 5 sec alternating, 1 hand for balance  EC SLS    SLS on green foam    Ambulation/Gait Training:  Pt brought a rollator to Therapy today that was donated to him. Practiced with rollator, braking and keeping walker close to him. Pt did not seem comfortable with the new AD. Will continue to work with him but may be inappropriate. Activity tolerance and post treatment pain report: Tolerated fair, fatigued at end of session     Education:  Education was provided to the patient on the following topics: Safe use of r/w. [x]    No changes were made to the home exercise program.  []    The following changes were made to the home exercise program:   Patient verbalized understanding of the topics presented. ASSESSMENT:    Patient s/p cervical discotomy in November 2016. Patient presents with a new rollator this date. Practice walking distance as well as outside. Pt not comfortable with new AD. Will continue to practice, but may be inappropriate for pt. Progressed balance and clinic exercises today with good tolerance. Patients progression toward goals is as follows:  []     Improving appropriately and progressing toward goals  [x]     Improving slowly and progressing toward goals  []     Not making progress toward goals and plan of care will be adjusted    PLAN OF CARE:   Patient continues to benefit from skilled intervention to address the above impairments. [x]    Continue treatment per established plan of care.   []     Recommend the following changes to the plan of care:     Recommendations/Intent for next treatment: Increase neuromuscular challenges     Britney Mccann PT   Time Calculation: 55 mins  Patient Time in clinic:   Start Time: 1050   Stop Time: 6730

## 2017-05-18 RX ORDER — HYDROCHLOROTHIAZIDE 25 MG/1
TABLET ORAL
Qty: 30 TAB | Refills: 12 | Status: SHIPPED | OUTPATIENT
Start: 2017-05-18

## 2017-05-24 ENCOUNTER — HOSPITAL ENCOUNTER (OUTPATIENT)
Dept: PHYSICAL THERAPY | Age: 67
Discharge: HOME | End: 2017-05-24
Payer: MEDICAID

## 2017-05-24 PROCEDURE — 97112 NEUROMUSCULAR REEDUCATION: CPT

## 2017-05-24 PROCEDURE — 97110 THERAPEUTIC EXERCISES: CPT

## 2017-05-24 PROCEDURE — 97140 MANUAL THERAPY 1/> REGIONS: CPT

## 2017-05-24 NOTE — PROGRESS NOTES
[facility]  [address], [address] [facility]    OUTPATIENT physical Therapy DAILY Treatment NOTe  Visit: 6    NAME: Hawa Go AGE: 78 y.o. GENDER: male  DATE: 5/24/2017  REFERRING PHYSICIAN: Saran Gupta MD        GOALS  Short term goals  Time frame: 5 weeks  1. Patient will be compliance and independent with the initial HEP as evidenced by being able to perform without cuing. 2. Patient tolerate 15 minutes of clinic exercise. 3. Report of fewer than 1 fall / week. 4. Patient will report decrease in neck pain 4/10    Long term goals  Time frame: 10 weeks  1. Patient will reports pain level decreased to 2/10 to allow increased ease of movement. 2. Patient will have an improved score on the LEFS outcome measure by 10 points to demonstrate an increase in functional activity tolerance. 3. Patient will be independent in final individualized HEP.   4.Patient will report fewer than 1 fall/ month            SUBJECTIVE:   \"Pt reports falling' I landed on my bottom, I didn't hit my head\"        OBJECTIVE DATA SUMMARY:   Objective data from initial evaluation:  Pain:  Location: neck and upper back  Quality: sharp  Now: 10/10  Factors that improve pain: rest    Posture:   Pt with flexed trunk    Strength:   B/l LE 3/5    Range of Motion:   Cx ROM limited to 75%, flex/ext and rotation    Spinal Assessment:   Flattened lumbar spine    Joint Mobility:   Cx mobility not assessed will await dr Sylvia Santo on surgery performed    Palpation:   TTP b/l UT, rhomboids, levator, cx paraspinals    Neurologic Assessment:   Tone: increased LE tone   Sensation: WFL   Reflexes: not tested    Mobility:   Transitional Movements: unsteady    Gait: Pt ambulates with walker, scissor gait with toe walking    Balance:  Poor, improved with r/w    Functional Measure:   LEFS: 33/80     TREATMENT/INTERVENTION:  Modalities (Rationale):  to decrease pain and muscle guarding  MHP to cervical spine for 10 minutes post treatment     Therapeutic Exercises:  HEP provided on evaluation:  Isometric Cx extension, Cx rotation stretch, Shoulder blade squeeze,   Hip abduction ,SLR, Shallow knee bends in walker, Heel/toe raises    Exercises in BOLD performed this date:    Standing:  Steamboats (hip abduction and extension): 10 reps B  Marches: 10 reps B  Mini squats: 10 reps  Toe raise/heel raise    Supine:   SLR: 10 reps B  Chin tucks: 10 reps   BKTC on yellow theraball  HS passive x 5 reps  Clamshells x 10 reps 2 sets    Seated:   LAQ x 10 reps with 2#  Marching in place 2#  Adductor squeeze x 10 reps 2 sets  Abduction against green TB x 20 reps  Toe taps on cones x 10 reps    C/s SB with gentle overpressure: 3 reps with 15 second holds  Pec stretch (manual): 3 reps with 10 second holds  Cx distraction gr 2    Pull-downs at Quantum 15#: 10 reps  Pull backs 15#: 10 reps    Manual Therapy:  Gentle STM to c/s paraspinals and UT     Neuro Re-Education:  Discussed balance challenges to decrease falls    Balance Training:  EC NBOS, perturbations  SLS x 5 sec alternating, 1 hand for balance  EC SLS    SLS on green foam    Ambulation/Gait Training:  Pt brought a rollator to Therapy today that was donated to him. Practiced with rollator, braking and keeping walker close to him. Pt did not seem comfortable with the new AD. Will continue to work with him but may be inappropriate. Activity tolerance and post treatment pain report: Tolerated fair, fatigued at end of session     Education:  Education was provided to the patient on the following topics: Safe use of r/w. [x]    No changes were made to the home exercise program.  []    The following changes were made to the home exercise program:   Patient verbalized understanding of the topics presented. ASSESSMENT:    Patient s/p cervical discotomy in November 2016. Reports decreased neck pain. .  Practice walking distance with rollator, braking and locking brakes to sit on rollator. .Pt with better management of rollator this date. Will continue to practice, pt still reluctant to use in the community    Progressed balance and clinic exercises today with good tolerance. Patients progression toward goals is as follows:  []     Improving appropriately and progressing toward goals  [x]     Improving slowly and progressing toward goals  []     Not making progress toward goals and plan of care will be adjusted    PLAN OF CARE:   Patient continues to benefit from skilled intervention to address the above impairments. [x]    Continue treatment per established plan of care.   []     Recommend the following changes to the plan of care:     Recommendations/Intent for next treatment: Increase neuromuscular challenges     Walter Putnam, PT   Time Calculation: 50 mins  Patient Time in clinic:   Start Time: 1045   Stop Time: [id number]

## 2017-05-25 ENCOUNTER — OFFICE VISIT (OUTPATIENT)
Dept: INTERNAL MEDICINE CLINIC | Age: 67
End: 2017-05-25

## 2017-05-25 VITALS
SYSTOLIC BLOOD PRESSURE: 130 MMHG | HEIGHT: 66 IN | OXYGEN SATURATION: 95 % | HEART RATE: 81 BPM | BODY MASS INDEX: 21.69 KG/M2 | TEMPERATURE: 98.3 F | WEIGHT: 135 LBS | RESPIRATION RATE: 16 BRPM | DIASTOLIC BLOOD PRESSURE: 72 MMHG

## 2017-05-25 DIAGNOSIS — B18.2 CHRONIC HEPATITIS C WITHOUT HEPATIC COMA (HCC): ICD-10-CM

## 2017-05-25 DIAGNOSIS — I10 ESSENTIAL HYPERTENSION: Primary | ICD-10-CM

## 2017-05-25 DIAGNOSIS — E03.4 HYPOTHYROIDISM DUE TO ACQUIRED ATROPHY OF THYROID: ICD-10-CM

## 2017-05-25 DIAGNOSIS — M50.30 DEGENERATIVE DISC DISEASE, CERVICAL: ICD-10-CM

## 2017-05-25 DIAGNOSIS — Z00.00 MEDICARE ANNUAL WELLNESS VISIT, SUBSEQUENT: ICD-10-CM

## 2017-05-25 DIAGNOSIS — R26.9 GAIT ABNORMALITY: ICD-10-CM

## 2017-05-25 DIAGNOSIS — B20 HIV (HUMAN IMMUNODEFICIENCY VIRUS INFECTION) (HCC): ICD-10-CM

## 2017-05-25 DIAGNOSIS — M51.36 DEGENERATIVE DISC DISEASE, LUMBAR: ICD-10-CM

## 2017-05-25 DIAGNOSIS — E55.9 VITAMIN D DEFICIENCY: ICD-10-CM

## 2017-05-25 LAB — GLUCOSE POC: 128 MG/DL

## 2017-05-25 NOTE — MR AVS SNAPSHOT
Visit Information Date & Time Provider Department Dept. Phone Encounter #  
 5/25/2017 11:15 AM João Damian MD [address] [place] [address] [phone] [id number] Follow-up Instructions Return in about 4 weeks (around 6/22/2017), or if symptoms worsen or fail to improve. Upcoming Health Maintenance Date Due  
 GLAUCOMA SCREENING Q2Y 11/22/2014 DTaP/Tdap/Td series (1 - Tdap) 4/26/2018* INFLUENZA AGE 9 TO ADULT 8/1/2017 Pneumococcal 65+ High/Highest Risk (2 of 2 - PPSV23) 10/8/2017 FOBT Q 1 YEAR AGE 50-75 5/16/2018 MEDICARE YEARLY EXAM 5/26/2018 *Topic was postponed. The date shown is not the original due date. Allergies as of 5/25/2017  Review Complete On: 5/25/2017 By: João Damian MD  
 No Known Allergies Current Immunizations  Reviewed on 12/5/2013 Name Date Influenza Vaccine 12/5/2013 Influenza Vaccine Split 10/8/2012 Pneumococcal Vaccine (Unspecified Type) 10/8/2012 Not reviewed this visit You Were Diagnosed With   
  
 Codes Comments Essential hypertension    -  Primary ICD-10-CM: I10 
ICD-9-CM: 401.9 Medicare annual wellness visit, subsequent     ICD-10-CM: Z00.00 ICD-9-CM: V70.0 HIV (human immunodeficiency virus infection) [facility]     ICD-10-CM: F51 ICD-9-CM: V08 Chronic hepatitis C without hepatic coma (HCC)     ICD-10-CM: B18.2 ICD-9-CM: 070.54 Hypothyroidism due to acquired atrophy of thyroid     ICD-10-CM: E03.4 ICD-9-CM: 244.8, 246.8 Vitamin D deficiency     ICD-10-CM: E55.9 ICD-9-CM: 268.9 Gait abnormality     ICD-10-CM: R26.9 ICD-9-CM: 915. 2 Degenerative disc disease, lumbar     ICD-10-CM: M51.36 
ICD-9-CM: 722.52 Degenerative disc disease, cervical     ICD-10-CM: M50.30 ICD-9-CM: 722.4 Vitals BP Pulse Temp Resp Height(growth percentile) Weight(growth percentile) 130/72 81 98.3 °F (36.8 °C) (Oral) 16 5' 6\" (1.676 m) 135 lb (61.2 kg) SpO2 BMI Smoking Status 95% 21.79 kg/m2 Current Every Day Smoker Vitals History BMI and BSA Data Body Mass Index Body Surface Area 21.79 kg/m 2 1.69 m 2 Preferred Pharmacy Pharmacy Name [facility] [address] [phone] Your Updated Medication List  
  
   
This list is accurate as of: 5/25/17 12:42 PM.  Always use your most recent med list.  
  
  
  
  
 aspirin 81 mg chewable tablet Take 81 mg by mouth daily. b complex vitamins tablet Take 1 Tab by mouth daily. cholecalciferol 1,000 unit Cap Commonly known as:  VITAMIN D3 Take  by mouth daily. cyclobenzaprine 10 mg tablet Commonly known as:  FLEXERIL  
take 1 tablet by mouth twice a day if needed for muscle spasm FLUoxetine 10 mg capsule Commonly known as:  PROzac  
  
 fluPHENAZine decanoate 25 mg/mL injection Commonly known as:  PROLIXIN  
  
 * gabapentin 100 mg capsule Commonly known as:  NEURONTIN  
take 1 capsule by mouth three times a day * gabapentin 300 mg capsule Commonly known as:  NEURONTIN Take 1 Cap by mouth three (3) times daily. hydroCHLOROthiazide 25 mg tablet Commonly known as:  HYDRODIURIL  
take 1 tablet by mouth once daily  
  
 ibuprofen 800 mg tablet Commonly known as:  MOTRIN  
take 1 tablet by mouth twice a day with food  
  
 ondansetron 4 mg disintegrating tablet Commonly known as:  ZOFRAN ODT  
  
 oxyCODONE IR 5 mg immediate release tablet Commonly known as:  Donna Mote HC 2.5 % rectal cream  
Generic drug:  hydrocortisone  
insert rectally four times a day Senna 8.6 mg tablet Generic drug:  senna STOOL SOFTENER 100 mg capsule Generic drug:  docusate sodium * tamsulosin 0.4 mg capsule Commonly known as:  FLOMAX * tamsulosin 0.4 mg capsule Commonly known as:  FLOMAX Take 1 Cap by mouth daily. trihexyphenidyl 5 mg tablet Commonly known as:  ARTANE Take 5 mg by mouth three (3) times daily. * Faraz Bench Commonly known as:  ULTRA-LIGHT ROLLATOR Use as directed daily * Faraz Bench Commonly known as:  [address] Use as directed daily * Faraz Bench Commonly known as:  [address] Use as needed * Notice: This list has 7 medication(s) that are the same as other medications prescribed for you. Read the directions carefully, and ask your doctor or other care provider to review them with you. We Performed the Following AMB POC GLUCOSE BLOOD, BY GLUCOSE MONITORING DEVICE [13806 CPT(R)] CBC W/O DIFF [68423 CPT(R)] METABOLIC PANEL, COMPREHENSIVE [81230 CPT(R)] REFERRAL TO PHYSICAL THERAPY [ATP73 Custom] Follow-up Instructions Return in about 4 weeks (around 6/22/2017), or if symptoms worsen or fail to improve. Referral Information Referral ID Referred By Referred To  
  
 [id number] [address] [facility] [address] [facility], PT   
   [place] [address] [place] [address], [address] [facility] Visits Status Start Date End Date 1 New Request 5/25/17 5/25/18 If your referral has a status of pending review or denied, additional information will be sent to support the outcome of this decision. Patient Instructions Locally Activation Thank you for requesting access to Locally. Please follow the instructions below to securely access and download your online medical record. Locally allows you to send messages to your doctor, view your test results, renew your prescriptions, schedule appointments, and more. How Do I Sign Up? 1. In your internet browser, go to [email or web address] 
2. Click on the First Time User? Click Here link in the Sign In box. You will be redirect to the New Member Sign Up page. 3. Enter your Locally Access Code exactly as it appears below.  You will not need to use this code after youve completed the sign-up process. If you do not sign up before the expiration date, you must request a new code. [employer] Access Code: Activation code not generated Current [employer] Status: Patient Declined (This is the date your [employer] access code will ) 4. Enter the last four digits of your Social Security Number (xxxx) and Date of Birth (mm/dd/yyyy) as indicated and click Submit. You will be taken to the next sign-up page. 5. Create a [employer] ID. This will be your [employer] login ID and cannot be changed, so think of one that is secure and easy to remember. 6. Create a [employer] password. You can change your password at any time. 7. Enter your Password Reset Question and Answer. This can be used at a later time if you forget your password. 8. Enter your e-mail address. You will receive e-mail notification when new information is available in [address]. 9. Click Sign Up. You can now view and download portions of your medical record. 10. Click the Download Summary menu link to download a portable copy of your medical information. Additional Information If you have questions, please visit the Frequently Asked Questions section of the [employer] website at [email or web address] Remember, [employer] is NOT to be used for urgent needs. For medical emergencies, dial 911. Please provide this summary of care documentation to your next provider. Your primary care clinician is listed as Olimpia Alejandre. If you have any questions after today's visit, please call [phone].

## 2017-05-25 NOTE — PROGRESS NOTES
Manju Blankenship is a 78 y.o. male and presents with Back Pain; Other (f/u on PT); and Annual Wellness Visit  . Subjective:  Hypertension Review:  The patient has hypertension  Diet and Lifestyle: generally follows a  low sodium diet, exercises sporadically  Home BP Monitoring: is not measured at home. Pertinent ROS: taking medications as instructed, no medication side effects noted, no TIA's, no chest pain on exertion, no dyspnea on exertion, no swelling of ankles. He recently had cervical disc surgery and is undergoing physical therapy presently    He has a history of hiv.he is under treatment      Manju Blankenship is a 78 y.o. male and presents for annual Medicare Wellness Visit. Problem List: Reviewed with patient and discussed risk factors. 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    Hematochezia K62.5    Pain in joint, shoulder region M25.519    Degenerative Joint Disease M19.90    Screening cholesterol level Z13.220    Right pontine stroke (HCC) I63.50    Stenosis of both carotid arteries without infarction I65.23    Cerebral microvascular disease I67.9    Stenosis of cervical spine with myelopathy M47.12    Ataxic gait R26.0    B12 deficiency E53.8    Vitamin D deficiency E55.9    Hypothyroidism due to acquired atrophy of thyroid E03.4       Current medical providers:  Patient Care Team:  Ed Silveira MD as PCP - General (Internal Medicine)  Lela Mars MD as Physician (Neurology)    PSH: Reviewed with patient  Past Surgical History:   Procedure Laterality Date    HX ORTHOPAEDIC      right foot surgery    HX ORTHOPAEDIC  11/07/2016    Anterior DISKECTOMY and Fusion    HX OTHER SURGICAL      bilat.  arm surgery due to abscess    HX OTHER SURGICAL      flank mole removal        SH: Reviewed with patient  Social History   Substance Use Topics    Smoking status: Current Every Day Smoker     Packs/day: 0.25    Smokeless tobacco: Never Used    Alcohol use No       FH: Reviewed with patient  Family History   Problem Relation Age of Onset    Kidney Disease Mother     Diabetes Mother     No Known Problems Father     Heart Disease Sister     Kidney Disease Sister     Diabetes Brother     Heart Disease Brother     Cancer Brother     Alcohol abuse Brother     Obesity Brother     Other Brother      Drugs       Medications/Allergies: Reviewed with patient  Current Outpatient Prescriptions on File Prior to Visit   Medication Sig Dispense Refill    hydroCHLOROthiazide (HYDRODIURIL) 25 mg tablet take 1 tablet by mouth once daily 30 Tab 12    gabapentin (NEURONTIN) 300 mg capsule Take 1 Cap by mouth three (3) times daily. 90 Cap 12    tamsulosin (FLOMAX) 0.4 mg capsule       fluPHENAZine decanoate (PROLIXIN) 25 mg/mL injection       tamsulosin (FLOMAX) 0.4 mg capsule Take 1 Cap by mouth daily. 30 Cap 12    cyclobenzaprine (FLEXERIL) 10 mg tablet take 1 tablet by mouth twice a day if needed for muscle spasm 90 Tab 3    Walker (MAYURI ROLLING WALKER) misc Use as needed 1 Each 0    ibuprofen (MOTRIN) 800 mg tablet take 1 tablet by mouth twice a day with food 60 Tab 12    PROCTOSOL HC 2.5 % rectal cream insert rectally four times a day 28.35 g 12    Walker (MAYURI ROLLING WALKER) misc Use as directed daily 1 Each 0    Walker (ULTRA-LIGHT ROLLATOR) misc Use as directed daily 1 Each 0    b complex vitamins tablet Take 1 Tab by mouth daily.  cholecalciferol (VITAMIN D3) 1,000 unit cap Take  by mouth daily.  FLUoxetine (PROZAC) 10 mg capsule   0    trihexyphenidyl (ARTANE) 5 mg tablet Take 5 mg by mouth three (3) times daily.       STOOL SOFTENER 100 mg capsule       oxyCODONE IR (ROXICODONE) 5 mg immediate release tablet       ondansetron (ZOFRAN ODT) 4 mg disintegrating tablet  SENNA 8.6 mg tablet       gabapentin (NEURONTIN) 100 mg capsule take 1 capsule by mouth three times a day 90 Cap 12    aspirin 81 mg chewable tablet Take 81 mg by mouth daily. No current facility-administered medications on file prior to visit. No Known Allergies    Objective:  Visit Vitals    /72    Pulse 81    Temp 98.3 °F (36.8 °C) (Oral)    Resp 16    Ht 5' 6\" (1.676 m)    Wt 135 lb (61.2 kg)    SpO2 95%    BMI 21.79 kg/m2    Body mass index is 21.79 kg/(m^2). Assessment of cognitive impairment: Alert and oriented x 3    Depression Screen:   PHQ over the last two weeks 5/25/2017   PHQ Not Done Patient Decline   Little interest or pleasure in doing things Not at all   Feeling down, depressed or hopeless Not at all   Total Score PHQ 2 0       Fall Risk Assessment:    Fall Risk Assessment, last 12 mths 5/25/2017   Able to walk? Yes   Fall in past 12 months? No       Functional Ability:   Does the patient exhibit a steady gait? yes   How long did it take the patient to get up and walk from a sitting position? seconds   Is the patient self reliant?  (ie can do own laundry, meals, household chores)  no     Does the patient handle his/her own medications?  no     Does the patient handle his/her own money? no     Is the patients home safe (ie good lighting, handrails on stairs and bath, etc.)? yes     Did you notice or did patient express any hearing difficulties? no     Did you notice or did patient express any vision difficulties?   no     Were distance and reading eye charts used?   no       Advance Care Planning:   Patient was offered the opportunity to discuss advance care planning:  no     Does patient have an Advance Directive:  no   If no, did you provide information on Caring Connections?  no       Plan:      Orders Placed This Encounter    CBC W/O DIFF    METABOLIC PANEL, COMPREHENSIVE    REFERRAL TO PHYSICAL THERAPY    AMB POC GLUCOSE BLOOD, BY GLUCOSE MONITORING DEVICE       Health Maintenance   Topic Date Due    GLAUCOMA SCREENING Q2Y  11/22/2014    DTaP/Tdap/Td series (1 - Tdap) 04/26/2018 (Originally 11/22/1971)    INFLUENZA AGE 9 TO ADULT  08/01/2017    Pneumococcal 65+ High/Highest Risk (2 of 2 - PPSV23) 10/08/2017    FOBT Q 1 YEAR AGE 50-75  05/16/2018    MEDICARE YEARLY EXAM  05/26/2018    ZOSTER VACCINE AGE 60>  Addressed       *Patient verbalized understanding and agreement with the plan. A copy of the After Visit Summary with personalized health plan was given to the patient today. Review of Systems  Constitutional: negative for fevers, chills, anorexia and weight loss  Eyes:   negative for visual disturbance and irritation  ENT:   negative for tinnitus,sore throat,nasal congestion,ear pains. hoarseness  Respiratory:  negative for cough, hemoptysis, dyspnea,wheezing  CV:   negative for chest pain, palpitations, lower extremity edema  GI:   negative for nausea, vomiting, diarrhea, abdominal pain,melena  Endo:               negative for polyuria,polydipsia,polyphagia,heat intolerance  Genitourinary: negative for frequency, dysuria and hematuria  Integument:  negative for rash and pruritus  Hematologic:  negative for easy bruising and gum/nose bleeding  Musculoskel: negative for myalgias, arthralgias, back pain, muscle weakness, joint pain  Neurological:  negative for headaches, dizziness, vertigo, memory problems and gait   Behavl/Psych: negative for feelings of anxiety, depression, mood changes    Past Medical History:   Diagnosis Date    Chronic hepatitis C without mention of hepatic coma 4/7/2011    Degenerative Joint Disease 7/13/2011    Hematochezia 4/7/2011    HIV (human immunodeficiency virus infection) ([facility] [address] 4/7/2011    Hypetension 4/7/2011    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 arm surgery due to abscess    HX OTHER SURGICAL      flank mole removal     Social History     Social History    Marital status: SINGLE     Spouse name: N/A    Number of children: N/A    Years of education: N/A     Social History Main Topics    Smoking status: Current Every Day Smoker     Packs/day: 0.25    Smokeless tobacco: Never Used    Alcohol use No    Drug use: No    Sexual activity: Not Asked     Other Topics Concern    None     Social History Narrative     Family History   Problem Relation Age of Onset    Kidney Disease Mother     Diabetes Mother     No Known Problems Father     Heart Disease Sister     Kidney Disease Sister     Diabetes Brother     Heart Disease Brother     Cancer Brother     Alcohol abuse Brother     Obesity Brother     Other Brother      Drugs     Current Outpatient Prescriptions   Medication Sig Dispense Refill    hydroCHLOROthiazide (HYDRODIURIL) 25 mg tablet take 1 tablet by mouth once daily 30 Tab 12    gabapentin (NEURONTIN) 300 mg capsule Take 1 Cap by mouth three (3) times daily. 90 Cap 12    tamsulosin (FLOMAX) 0.4 mg capsule       fluPHENAZine decanoate (PROLIXIN) 25 mg/mL injection       tamsulosin (FLOMAX) 0.4 mg capsule Take 1 Cap by mouth daily. 30 Cap 12    cyclobenzaprine (FLEXERIL) 10 mg tablet take 1 tablet by mouth twice a day if needed for muscle spasm 90 Tab 3    Walker (MAYURI ROLLING WALKER) misc Use as needed 1 Each 0    ibuprofen (MOTRIN) 800 mg tablet take 1 tablet by mouth twice a day with food 60 Tab 12    PROCTOSOL HC 2.5 % rectal cream insert rectally four times a day 28.35 g 12    Walker (MAYURI ROLLING WALKER) misc Use as directed daily 1 Each 0    Walker (ULTRA-LIGHT ROLLATOR) misc Use as directed daily 1 Each 0    b complex vitamins tablet Take 1 Tab by mouth daily.       cholecalciferol (VITAMIN D3) 1,000 unit cap Take by mouth daily.  FLUoxetine (PROZAC) 10 mg capsule   0    trihexyphenidyl (ARTANE) 5 mg tablet Take 5 mg by mouth three (3) times daily.  STOOL SOFTENER 100 mg capsule       oxyCODONE IR (ROXICODONE) 5 mg immediate release tablet       ondansetron (ZOFRAN ODT) 4 mg disintegrating tablet       SENNA 8.6 mg tablet       gabapentin (NEURONTIN) 100 mg capsule take 1 capsule by mouth three times a day 90 Cap 12    aspirin 81 mg chewable tablet Take 81 mg by mouth daily. No Known Allergies    Objective:  Visit Vitals    /72    Pulse 81    Temp 98.3 °F (36.8 °C) (Oral)    Resp 16    Ht 5' 6\" (1.676 m)    Wt 135 lb (61.2 kg)    SpO2 95%    BMI 21.79 kg/m2     Physical Exam:   General appearance - alert, well appearing, and in no distress  Mental status - alert, oriented to person, place, and time  EYE-SHANNAN, EOMI, corneas normal, no foreign bodies  ENT-ENT exam normal, no neck nodes or sinus tenderness  Nose - normal and patent, no erythema, discharge or polyps  Mouth - mucous membranes moist, pharynx normal without lesions  Neck - supple, no significant adenopathy   Chest - clear to auscultation, no wheezes, rales or rhonchi, symmetric air entry   Heart - normal rate, regular rhythm, normal S1, S2, no murmurs, rubs, clicks or gallops   Abdomen - soft, nontender, nondistended, no masses or organomegaly  Lymph- no adenopathy palpable  Ext-peripheral pulses normal, no pedal edema, no clubbing or cyanosis  Skin-Warm and dry.  no hyperpigmentation, vitiligo, or suspicious lesions  Neuro -alert, oriented, normal speech, no focal findings or movement disorder noted  Neck-normal C-spine, no tenderness, full ROM without pain  Feet-no nail deformities or callus formation with good pulses noted      Results for orders placed or performed in visit on 05/25/17   AMB POC GLUCOSE BLOOD, BY GLUCOSE MONITORING DEVICE   Result Value Ref Range    Glucose  mg/dL       Assessment/Plan:    ICD-10-CM ICD-9-CM    1. Essential hypertension I10 401.9    2. Medicare annual wellness visit, subsequent Z00.00 V70.0 AMB POC GLUCOSE BLOOD, BY GLUCOSE MONITORING DEVICE      CBC W/O DIFF      METABOLIC PANEL, COMPREHENSIVE   3. HIV (human immunodeficiency virus infection) ([facility] [address] Z21 V08    4. Chronic hepatitis C without hepatic coma (HCC) B18.2 070.54    5. Hypothyroidism due to acquired atrophy of thyroid E03.4 244.8      246.8    6. Vitamin D deficiency E55.9 268.9    7. Gait abnormality R26.9 781.2    8. Degenerative disc disease, lumbar M51.36 722.52    9. Degenerative disc disease, cervical M50.30 722.4 REFERRAL TO PHYSICAL THERAPY     Orders Placed This Encounter    CBC W/O DIFF    METABOLIC PANEL, COMPREHENSIVE    REFERRAL TO PHYSICAL THERAPY     Referral Priority:   Routine     Referral Type:   PT/OT/ST     Referral Reason:   Specialty Services Required     Referred to Provider: Daxa Mckinnon, PT    AMB POC GLUCOSE BLOOD, BY GLUCOSE MONITORING DEVICE     lose weight, follow low fat diet, follow low salt diet,Take 81mg aspirin daily  Patient Instructions   [employer] Activation    Thank you for requesting access to [employer]. Please follow the instructions below to securely access and download your online medical record. [employer] allows you to send messages to your doctor, view your test results, renew your prescriptions, schedule appointments, and more. How Do I Sign Up? 1. In your internet browser, go to [email or web address]  2. Click on the First Time User? Click Here link in the Sign In box. You will be redirect to the New Member Sign Up page. 3. Enter your [employer] Access Code exactly as it appears below. You will not need to use this code after youve completed the sign-up process. If you do not sign up before the expiration date, you must request a new code. [employer] Access Code:  Activation code not generated  Current [employer] Status: Patient Declined (This is the date your [employer] access code will )    4. Enter the last four digits of your Social Security Number (xxxx) and Date of Birth (mm/dd/yyyy) as indicated and click Submit. You will be taken to the next sign-up page. 5. Create a [employer] Network[employer] ID. This will be your [employer] login ID and cannot be changed, so think of one that is secure and easy to remember. 6. Create a [employer] password. You can change your password at any time. 7. Enter your Password Reset Question and Answer. This can be used at a later time if you forget your password. 8. Enter your e-mail address. You will receive e-mail notification when new information is available in [address] 9. Click Sign Up. You can now view and download portions of your medical record. 10. Click the Download Summary menu link to download a portable copy of your medical information. Additional Information    If you have questions, please visit the Frequently Asked Questions section of the [email or web address] website at [email or web address] Remember, [email or web address] is NOT to be used for urgent needs. For medical emergencies, dial 911. Follow-up Disposition:  Return in about 4 weeks (around 2017), or if symptoms worsen or fail to improve. I have reviewed with the patient details of the assessment and plan and all questions were answered. Relevent patient education was performed. The most recent lab findings were reviewed with the patient. An After Visit Summary was printed and given to the patient.

## 2017-05-25 NOTE — PATIENT INSTRUCTIONS
[employer] Activation    Thank you for requesting access to [employer] Please follow the instructions below to securely access and download your online medical record. [employer] allows you to send messages to your doctor, view your test results, renew your prescriptions, schedule appointments, and more. How Do I Sign Up? 1. In your internet browser, go to [email or web address]  2. Click on the First Time User? Click Here link in the Sign In box. You will be redirect to the New Member Sign Up page. 3. Enter your [employer] Access Code exactly as it appears below. You will not need to use this code after youve completed the sign-up process. If you do not sign up before the expiration date, you must request a new code. [employer] Access Code: Activation code not generated  Current [employer] Status: Patient Declined (This is the date your [employer] access code will )    4. Enter the last four digits of your Social Security Number (xxxx) and Date of Birth (mm/dd/yyyy) as indicated and click Submit. You will be taken to the next sign-up page. 5. Create a [employer] ID. This will be your [employer] login ID and cannot be changed, so think of one that is secure and easy to remember. 6. Create a [employer] password. You can change your password at any time. 7. Enter your Password Reset Question and Answer. This can be used at a later time if you forget your password. 8. Enter your e-mail address. You will receive e-mail notification when new information is available in [address] 9. Click Sign Up. You can now view and download portions of your medical record. 10. Click the Download Summary menu link to download a portable copy of your medical information. Additional Information    If you have questions, please visit the Frequently Asked Questions section of the [employer] website at [email or web address] Remember, [employer] is NOT to be used for urgent needs. For medical emergencies, dial 911.

## 2017-05-31 ENCOUNTER — HOSPITAL ENCOUNTER (OUTPATIENT)
Dept: PHYSICAL THERAPY | Age: 67
Discharge: HOME | End: 2017-05-31
Payer: MEDICAID

## 2017-05-31 PROCEDURE — 97112 NEUROMUSCULAR REEDUCATION: CPT

## 2017-05-31 PROCEDURE — 97140 MANUAL THERAPY 1/> REGIONS: CPT

## 2017-05-31 PROCEDURE — 97110 THERAPEUTIC EXERCISES: CPT

## 2017-05-31 RX ORDER — IBUPROFEN 800 MG/1
TABLET ORAL
Qty: 60 TAB | Refills: 12 | Status: SHIPPED | OUTPATIENT
Start: 2017-05-31 | End: 2018-03-22 | Stop reason: SDUPTHER

## 2017-05-31 NOTE — PROGRESS NOTES
[facility], [facility]  [address], [address] [facility]    OUTPATIENT physical Therapy DAILY Treatment NOTe  Visit: 7    NAME: Douglas Kwong AGE: 78 y.o. GENDER: male  DATE: 5/31/2017  REFERRING PHYSICIAN: Morgan Ruelas MD        GOALS  Short term goals  Time frame: 5 weeks  1. Patient will be compliance and independent with the initial HEP as evidenced by being able to perform without cuing. 2. Patient tolerate 15 minutes of clinic exercise. 3. Report of fewer than 1 fall / week. 4. Patient will report decrease in neck pain 4/10    Long term goals  Time frame: 10 weeks  1. Patient will reports pain level decreased to 2/10 to allow increased ease of movement. 2. Patient will have an improved score on the LEFS outcome measure by 10 points to demonstrate an increase in functional activity tolerance. 3. Patient will be independent in final individualized HEP.   4.Patient will report fewer than 1 fall/ month            SUBJECTIVE:   \"Pt reports falling' I landed on my bottom, I didn't hit my head\"        OBJECTIVE DATA SUMMARY:   Objective data from initial evaluation:  Pain:  Location: neck and upper back  Quality: sharp  Now: 10/10  Factors that improve pain: rest    Posture:   Pt with flexed trunk    Strength:   B/l LE 3/5    Range of Motion:   Cx ROM limited to 75%, flex/ext and rotation    Spinal Assessment:   Flattened lumbar spine    Joint Mobility:   Cx mobility not assessed will await dr Anali Gabriel on surgery performed    Palpation:   TTP b/l UT, rhomboids, levator, cx paraspinals    Neurologic Assessment:   Tone: increased LE tone   Sensation: WFL   Reflexes: not tested    Mobility:   Transitional Movements: unsteady    Gait: Pt ambulates with walker, scissor gait with toe walking    Balance:  Poor, improved with r/w    Functional Measure:   LEFS: 33/80     TREATMENT/INTERVENTION:  Modalities (Rationale):  to decrease pain and muscle guarding  MHP to cervical spine for 10 minutes post treatment     Therapeutic Exercises:  HEP provided on evaluation:  Isometric Cx extension, Cx rotation stretch, Shoulder blade squeeze,   Hip abduction ,SLR, Shallow knee bends in walker, Heel/toe raises    Exercises in BOLD performed this date:    Standing:  Steamboats (hip abduction and extension): 10 reps B  Marches: 10 reps B  Mini squats: 10 reps  Toe raise/heel raise  Heel/toe on rocker board, double and SLS (CGA)    Supine:   SLR: 10 reps B  Chin tucks: 10 reps   BKTC on yellow theraball  HS passive x 5 reps  Clamshells x 10 reps 2 sets    Seated:   LAQ x 10 reps with 2#  Marching in place 2#  Adductor squeeze x 10 reps 2 sets  Abduction against green TB x 20 reps  Toe taps on cones x 10 reps    C/s SB with gentle overpressure: 3 reps with 15 second holds  Pec stretch (manual): 3 reps with 10 second holds  Cx distraction gr 2    Pull-downs at Quantum 15#: 10 reps  Pull backs 15#: 10 reps    Manual Therapy:  Gentle STM to c/s paraspinals and UT     Neuro Re-Education:  Discussed balance challenges to decrease falls    Balance Training:  EC NBOS, perturbations  SLS x 5 sec alternating, 1 hand for balance  EC SLS    SLS on green foam    Ambulation/Gait Training:  Pt did not bring rollator to Therapy session today,   Will continue to work with him but may be inappropriate. Activity tolerance and post treatment pain report: Tolerated fair, fatigued at end of session     Education:  Education was provided to the patient on the following topics: Safe use of r/w. [x]    No changes were made to the home exercise program.  []    The following changes were made to the home exercise program:   Patient verbalized understanding of the topics presented. ASSESSMENT:    Patient s/p cervical discotomy in November 2016.    Pt did not bring rollator to therapy session today, will continue to practice, pt still reluctant to use in the community    Progressed balance and clinic exercises today with good tolerance, focused on UE strengthening and stretching. Continue to progress neuromuscular challenges. Patients progression toward goals is as follows:  []     Improving appropriately and progressing toward goals  [x]     Improving slowly and progressing toward goals  []     Not making progress toward goals and plan of care will be adjusted    PLAN OF CARE:   Patient continues to benefit from skilled intervention to address the above impairments. [x]    Continue treatment per established plan of care. []     Recommend the following changes to the plan of care:     Recommendations/Intent for next treatment: Increase neuromuscular challenges, ambulate with rollator if available.      Ramy Palafox, PT     Patient Time in clinic:

## 2017-06-07 ENCOUNTER — HOSPITAL ENCOUNTER (OUTPATIENT)
Dept: PHYSICAL THERAPY | Age: 67
Discharge: HOME | End: 2017-06-07
Payer: MEDICAID

## 2017-06-07 PROCEDURE — 97110 THERAPEUTIC EXERCISES: CPT | Performed by: PHYSICAL THERAPIST

## 2017-06-07 NOTE — PROGRESS NOTES
[facility]  [address], [address] [facility]    OUTPATIENT physical Therapy DAILY Treatment NOTe  Visit: 8    NAME: Geovanna Christianson AGE: 78 y.o. GENDER: male  DATE: 6/7/2017  REFERRING PHYSICIAN: Nelson Emmanuel MD        GOALS  Short term goals  Time frame: 5 weeks  1. Patient will be compliance and independent with the initial HEP as evidenced by being able to perform without cuing. 2. Patient tolerate 15 minutes of clinic exercise. 3. Report of fewer than 1 fall / week. 4. Patient will report decrease in neck pain 4/10    Long term goals  Time frame: 10 weeks  1. Patient will reports pain level decreased to 2/10 to allow increased ease of movement. 2. Patient will have an improved score on the LEFS outcome measure by 10 points to demonstrate an increase in functional activity tolerance. 3. Patient will be independent in final individualized HEP. 4.Patient will report fewer than 1 fall/ month            SUBJECTIVE:   \"My neck pain is less than before I had the surgery but it's still a 10/10. \"  No falls reported this week.     OBJECTIVE DATA SUMMARY:   Objective data from initial evaluation:  Pain:  Location: neck and upper back  Quality: sharp  Now: 10/10  Factors that improve pain: rest    Posture:   Pt with flexed trunk    Strength:   B/l LE 3/5    Range of Motion:   Cx ROM limited to 75%, flex/ext and rotation    Spinal Assessment:   Flattened lumbar spine    Joint Mobility:   Cx mobility not assessed will await dr boston on surgery performed    Palpation:   TTP b/l UT, rhomboids, levator, cx paraspinals    Neurologic Assessment:   Tone: increased LE tone   Sensation: WFL   Reflexes: not tested    Mobility:   Transitional Movements: unsteady    Gait: Pt ambulates with walker, scissor gait with toe walking    Balance:  Poor, improved with r/w    Functional Measure:   LEFS: 33/80     TREATMENT/INTERVENTION:  Modalities (Rationale):  to decrease pain and muscle guarding  MHP to cervical spine for 10 minutes post treatment     Therapeutic Exercises:  HEP provided on evaluation:  Isometric Cx extension, Cx rotation stretch, Shoulder blade squeeze,   Hip abduction ,SLR, Shallow knee bends in walker, Heel/toe raises    Exercises in BOLD performed this date: Additional exercises held as patient was 23 minutes late to session; patient is brought to therapy by counselor Zulema Lucio. Standing:  Steamboats (hip abduction and extension): 10 reps B  Marches: 10 reps B  Mini squats: 10 reps  Toe raise/heel raise  Heel/toe on rocker board, double and SLS (CGA)    Supine:   SLR: 10 reps B  Chin tucks: 10 reps   BKTC on yellow theraball  HS passive x 5 reps  Clamshells x 10 reps 2 sets    Seated:   LAQ x 10 reps with 2#  Marching in place 2#  Adductor squeeze x 10 reps 2 sets  Abduction against green TB x 20 reps  Toe taps on cones x 10 reps  Cervical rotation and sidebend: 10 reps   Pull-downs at Quantum 15#: 10 reps  Pull backs 15#: 10 reps    Manual Therapy:  Gentle STM to c/s paraspinals and UT     Neuro Re-Education:  Discussed balance challenges to decrease falls    Balance Training:  EC NBOS, perturbations  SLS x 5 sec alternating, 1 hand for balance  EC SLS    SLS on green foam    Ambulation/Gait Training:  Patient ambulated 50 feet with rollator walker this date. Patient demonstrates steadier gait with front wheeled walker. Activity tolerance and post treatment pain report: Tolerated fair, fatigued at end of session     Education:  Education was provided to the patient on the following topics: Safe use of r/w. [x]    No changes were made to the home exercise program.  []    The following changes were made to the home exercise program:   Patient verbalized understanding of the topics presented. ASSESSMENT:   Patient s/p cervical discotomy in November 2016. Patient 23 minutes late to session.  Patient continues to report neck pain; patient states his pain in less now than prior to surgery but that it is a 10/10. Patient ambulated 50 feet with rollator walker this date; patient observed to be steadier with front wheeled walker. Patient with poor activity tolerance, requiring increased rest breaks secondary to BLE fatigue. Continue to progress neuromuscular challenges. Patients progression toward goals is as follows:  []     Improving appropriately and progressing toward goals  [x]     Improving slowly and progressing toward goals  []     Not making progress toward goals and plan of care will be adjusted    PLAN OF CARE:   Patient continues to benefit from skilled intervention to address the above impairments. [x]    Continue treatment per established plan of care. []     Recommend the following changes to the plan of care:     Recommendations/Intent for next treatment: Increase neuromuscular challenges, ambulate with rollator if available.      Janey Dee, PT   Time Calculation: 32 mins  Patient Time in clinic:   Start Time: 3719   Stop Time: [phone]

## 2017-06-14 ENCOUNTER — HOSPITAL ENCOUNTER (OUTPATIENT)
Dept: PHYSICAL THERAPY | Age: 67
Discharge: HOME | End: 2017-06-14
Payer: MEDICAID

## 2017-06-14 PROCEDURE — 97110 THERAPEUTIC EXERCISES: CPT | Performed by: PHYSICAL THERAPIST

## 2017-06-14 NOTE — PROGRESS NOTES
[facility]  [address], [address] [facility]    OUTPATIENT physical Therapy DAILY Treatment NOTe  Visit: 9    NAME: Argelia Mariee AGE: 78 y.o. GENDER: male  DATE: 6/14/2017  REFERRING PHYSICIAN: Nicolette Aguayo MD        GOALS  Short term goals  Time frame: 5 weeks  1. Patient will be compliance and independent with the initial HEP as evidenced by being able to perform without cuing. 2. Patient tolerate 15 minutes of clinic exercise. 3. Report of fewer than 1 fall / week. 4. Patient will report decrease in neck pain 4/10    Long term goals  Time frame: 10 weeks  1. Patient will reports pain level decreased to 2/10 to allow increased ease of movement. 2. Patient will have an improved score on the LEFS outcome measure by 10 points to demonstrate an increase in functional activity tolerance. 3. Patient will be independent in final individualized HEP. 4.Patient will report fewer than 1 fall/ month            SUBJECTIVE:   \"My neck is sore. \" After exercises, \"My neck feels better. \"  No falls reported this week.     OBJECTIVE DATA SUMMARY:   Objective data from initial evaluation:  Pain:  Location: neck and upper back  Quality: sharp  Now: 10/10  Factors that improve pain: rest    Posture:   Pt with flexed trunk    Strength:   B/l LE 3/5    Range of Motion:   Cx ROM limited to 75%, flex/ext and rotation    Spinal Assessment:   Flattened lumbar spine    Joint Mobility:   Cx mobility not assessed will await dr boston on surgery performed    Palpation:   TTP b/l UT, rhomboids, levator, cx paraspinals    Neurologic Assessment:   Tone: increased LE tone   Sensation: WFL   Reflexes: not tested    Mobility:   Transitional Movements: unsteady    Gait: Pt ambulates with walker, scissor gait with toe walking    Balance:  Poor, improved with r/w    Functional Measure:   LEFS: 33/80     TREATMENT/INTERVENTION:  Modalities (Rationale):  to decrease pain and muscle guarding  MHP to cervical spine for 10 minutes post treatment     Therapeutic Exercises:  HEP provided on evaluation:  Isometric Cx extension, Cx rotation stretch, Shoulder blade squeeze,   Hip abduction ,SLR, Shallow knee bends in walker, Heel/toe raises    Exercises in BOLD performed this date:    Standing:  Steamboats (hip abduction and extension): 15 reps B  Marches: 15 reps B  Mini squats: 15 reps  Toe raise/heel raise  Heel/toe on rocker board, double and SLS (CGA)    Supine:   SLR: 10 reps B  Chin tucks: 10 reps   BKTC on yellow theraball  HS passive x 5 reps  Clamshells x 10 reps 2 sets    Seated:   LAQ x 10 reps with 2#  Marching in place 2#  Hip adductor squeeze with ball: 10 reps with 10 second holds  Hip abduction with green TB: 10 reps  Toe taps on cones x 10 reps  Cervical rotation and sidebend: 10 reps   Pull-downs at Quantum 15#: 10 reps  Pull backs 15#: 10 reps    Manual Therapy:  Gentle STM to c/s paraspinals and UT     Neuro Re-Education:  Discussed balance challenges to decrease falls    Balance Training:  EC NBOS, perturbations  SLS x 5 sec alternating, 1 hand for balance  EC SLS    SLS on green foam    Ambulation/Gait Training:  Patient ambulated 50 feet with rollator walker this date. Patient demonstrates steadier gait with front wheeled walker. Activity tolerance and post treatment pain report: Tolerated fair, fatigued at end of session     Education:  Education was provided to the patient on the following topics: Safe use of r/w. [x]    No changes were made to the home exercise program.  []    The following changes were made to the home exercise program:   Patient verbalized understanding of the topics presented. ASSESSMENT:   Patient s/p cervical discotomy in November 2016. Patient continues to report neck pain; patient states his pain in less now than prior to surgery.  Patient with improved activity tolerance when compared to last session, but continues to require increased rest breaks secondary to BLE fatigue. Continue to progress as able. Patients progression toward goals is as follows:  []     Improving appropriately and progressing toward goals  [x]     Improving slowly and progressing toward goals  []     Not making progress toward goals and plan of care will be adjusted    PLAN OF CARE:   Patient continues to benefit from skilled intervention to address the above impairments. [x]    Continue treatment per established plan of care. []     Recommend the following changes to the plan of care:     Recommendations/Intent for next treatment: Increase neuromuscular challenges, ambulate with rollator if available.      Morales Andrews, PT   Time Calculation: 40 mins  Patient Time in clinic:   Start Time: 1045   Stop Time: 1125        TREATMENT PLAN EFFECTIVE DATES:   6/21/2017 TO 8/21/2017  I have read the above plan of care for Yosef Mosqueda and certify the need for skilled physical therapy services.     Physician Signature: ____________________________________________________     Date: _________________________________________________________________

## 2017-06-21 ENCOUNTER — OFFICE VISIT (OUTPATIENT)
Dept: INTERNAL MEDICINE CLINIC | Age: 67
End: 2017-06-21

## 2017-06-21 ENCOUNTER — HOSPITAL ENCOUNTER (OUTPATIENT)
Dept: PHYSICAL THERAPY | Age: 67
Discharge: HOME | End: 2017-06-21
Payer: MEDICAID

## 2017-06-21 VITALS
SYSTOLIC BLOOD PRESSURE: 136 MMHG | DIASTOLIC BLOOD PRESSURE: 70 MMHG | OXYGEN SATURATION: 98 % | TEMPERATURE: 97.6 F | RESPIRATION RATE: 19 BRPM | HEART RATE: 72 BPM | WEIGHT: 133 LBS | HEIGHT: 66 IN | BODY MASS INDEX: 21.38 KG/M2

## 2017-06-21 DIAGNOSIS — E55.9 VITAMIN D DEFICIENCY: ICD-10-CM

## 2017-06-21 DIAGNOSIS — I10 ESSENTIAL HYPERTENSION: ICD-10-CM

## 2017-06-21 DIAGNOSIS — B20 HIV (HUMAN IMMUNODEFICIENCY VIRUS INFECTION) (HCC): ICD-10-CM

## 2017-06-21 DIAGNOSIS — R26.9 GAIT ABNORMALITY: ICD-10-CM

## 2017-06-21 DIAGNOSIS — M50.30 DEGENERATIVE DISC DISEASE, CERVICAL: ICD-10-CM

## 2017-06-21 DIAGNOSIS — B18.2 CHRONIC HEPATITIS C WITHOUT HEPATIC COMA (HCC): ICD-10-CM

## 2017-06-21 DIAGNOSIS — M51.36 DEGENERATIVE DISC DISEASE, LUMBAR: ICD-10-CM

## 2017-06-21 DIAGNOSIS — E03.4 HYPOTHYROIDISM DUE TO ACQUIRED ATROPHY OF THYROID: ICD-10-CM

## 2017-06-21 DIAGNOSIS — Z11.1 SCREENING FOR TUBERCULOSIS: Primary | ICD-10-CM

## 2017-06-21 RX ORDER — HYDROCORTISONE 25 MG/G
CREAM TOPICAL
Qty: 28.35 G | Refills: 12 | Status: SHIPPED | OUTPATIENT
Start: 2017-06-21 | End: 2017-07-27 | Stop reason: SDUPTHER

## 2017-06-21 NOTE — PROGRESS NOTES
Jeannie Lee is a 78 y.o. male and presents with Immunization/Injection  . Subjective:  Hypertension Review:  The patient has hypertension  Diet and Lifestyle: generally follows a  low sodium diet, exercises sporadically  Home BP Monitoring: is not measured at home. Pertinent ROS: taking medications as instructed, no medication side effects noted, no TIA's, no chest pain on exertion, no dyspnea on exertion, no swelling of ankles. He recently had cervical disc surgery and is undergoing physical therapy presently  He has seen neurosurgery with no new recomendations    He has a history of hiv.he is under treatment    He has had poor gait and need PT      Review of Systems  Constitutional: negative for fevers, chills, anorexia and weight loss  Eyes:   negative for visual disturbance and irritation  ENT:   negative for tinnitus,sore throat,nasal congestion,ear pains. hoarseness  Respiratory:  negative for cough, hemoptysis, dyspnea,wheezing  CV:   negative for chest pain, palpitations, lower extremity edema  GI:   negative for nausea, vomiting, diarrhea, abdominal pain,melena  Endo:               negative for polyuria,polydipsia,polyphagia,heat intolerance  Genitourinary: negative for frequency, dysuria and hematuria  Integument:  negative for rash and pruritus  Hematologic:  negative for easy bruising and gum/nose bleeding  Musculoskel: negative for myalgias, arthralgias, back pain, muscle weakness, joint pain  Neurological:  negative for headaches, dizziness, vertigo, memory problems and gait   Behavl/Psych: negative for feelings of anxiety, depression, mood changes    Past Medical History:   Diagnosis Date    Chronic hepatitis C without mention of hepatic coma 4/7/2011    Degenerative Joint Disease 7/13/2011    Hematochezia 4/7/2011    HIV (human immunodeficiency virus infection) ([facility] [address] 4/7/2011    Hypetension 4/7/2011    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 arm surgery due to abscess    HX OTHER SURGICAL      flank mole removal     Social History     Social History    Marital status: SINGLE     Spouse name: N/A    Number of children: N/A    Years of education: N/A     Social History Main Topics    Smoking status: Current Every Day Smoker     Packs/day: 0.25    Smokeless tobacco: Never Used    Alcohol use No    Drug use: No    Sexual activity: Not Asked     Other Topics Concern    None     Social History Narrative     Family History   Problem Relation Age of Onset    Kidney Disease Mother     Diabetes Mother     No Known Problems Father     Heart Disease Sister     Kidney Disease Sister     Diabetes Brother     Heart Disease Brother     Cancer Brother     Alcohol abuse Brother     Obesity Brother     Other Brother      Drugs     Current Outpatient Prescriptions   Medication Sig Dispense Refill    hydrocortisone (PROCTOSOL HC) 2.5 % rectal cream insert rectally four times a day 28.35 g 12    ibuprofen (MOTRIN) 800 mg tablet take 1 tablet by mouth twice a day with food 60 Tab 12    hydroCHLOROthiazide (HYDRODIURIL) 25 mg tablet take 1 tablet by mouth once daily 30 Tab 12    gabapentin (NEURONTIN) 300 mg capsule Take 1 Cap by mouth three (3) times daily. 90 Cap 12    STOOL SOFTENER 100 mg capsule       oxyCODONE IR (ROXICODONE) 5 mg immediate release tablet       ondansetron (ZOFRAN ODT) 4 mg disintegrating tablet       tamsulosin (FLOMAX) 0.4 mg capsule       SENNA 8.6 mg tablet       fluPHENAZine decanoate (PROLIXIN) 25 mg/mL injection       tamsulosin (FLOMAX) 0.4 mg capsule Take 1 Cap by mouth daily.  30 Cap 12    cyclobenzaprine (FLEXERIL) 10 mg tablet take 1 tablet by mouth twice a day if needed for muscle spasm 90 Tab 3  Walker (MAYURI ROLLING WALKER) misc Use as needed 1 Each 0    gabapentin (NEURONTIN) 100 mg capsule take 1 capsule by mouth three times a day 90 Cap 12    Walker (MAYURI ROLLING WALKER) misc Use as directed daily 1 Each 0    Walker (ULTRA-LIGHT ROLLATOR) misc Use as directed daily 1 Each 0    aspirin 81 mg chewable tablet Take 81 mg by mouth daily.  b complex vitamins tablet Take 1 Tab by mouth daily.  cholecalciferol (VITAMIN D3) 1,000 unit cap Take  by mouth daily.  FLUoxetine (PROZAC) 10 mg capsule   0    trihexyphenidyl (ARTANE) 5 mg tablet Take 5 mg by mouth three (3) times daily. No Known Allergies    Objective:  Visit Vitals    /70 (BP 1 Location: Right arm, BP Patient Position: Sitting)    Pulse 72    Temp 97.6 °F (36.4 °C) (Oral)    Resp 19    Ht 5' 6\" (1.676 m)    Wt 133 lb (60.3 kg)    SpO2 98%    BMI 21.47 kg/m2     Physical Exam:   General appearance - alert, well appearing, and in no distress  Mental status - alert, oriented to person, place, and time  EYE-SHANNAN, EOMI, corneas normal, no foreign bodies  ENT-ENT exam normal, no neck nodes or sinus tenderness  Nose - normal and patent, no erythema, discharge or polyps  Mouth - mucous membranes moist, pharynx normal without lesions  Neck - supple, no significant adenopathy   Chest - clear to auscultation, no wheezes, rales or rhonchi, symmetric air entry   Heart - normal rate, regular rhythm, normal S1, S2, no murmurs, rubs, clicks or gallops   Abdomen - soft, nontender, nondistended, no masses or organomegaly  Lymph- no adenopathy palpable  Ext-peripheral pulses normal, no pedal edema, no clubbing or cyanosis  Skin-Warm and dry.  no hyperpigmentation, vitiligo, or suspicious lesions  Neuro -alert, oriented, normal speech, no focal findings or movement disorder noted  Neck-normal C-spine, no tenderness, full ROM without pain  Feet-no nail deformities or callus formation with good pulses noted      Results for orders placed or performed in visit on 05/25/17   AMB POC GLUCOSE BLOOD, BY GLUCOSE MONITORING DEVICE   Result Value Ref Range    Glucose  mg/dL       Assessment/Plan:    ICD-10-CM ICD-9-CM    1. Screening for tuberculosis Z11.1 V74.1 AMB POC TUBERCULOSIS, INTRADERMAL (SKIN TEST)   2. HIV (human immunodeficiency virus infection) ([facility] [address] Z21 V08    3. Chronic hepatitis C without hepatic coma (HCC) B18.2 070.54    4. Hypothyroidism due to acquired atrophy of thyroid E03.4 244.8      246.8    5. Vitamin D deficiency E55.9 268.9    6. Gait abnormality R26.9 781.2 REFERRAL TO PHYSICAL THERAPY   7. Essential hypertension I10 401.9    8. Degenerative disc disease, lumbar M51.36 722.52 REFERRAL TO PHYSICAL THERAPY   9. Degenerative disc disease, cervical M50.30 722.4 REFERRAL TO PHYSICAL THERAPY     Orders Placed This Encounter    REFERRAL TO PHYSICAL THERAPY     Referral Priority:   Routine     Referral Type:   PT/OT/ST     Referral Reason:   Specialty Services Required    AMB POC TUBERCULOSIS, INTRADERMAL (SKIN TEST)    hydrocortisone (PROCTOSOL HC) 2.5 % rectal cream     Sig: insert rectally four times a day     Dispense:  28.35 g     Refill:  12     lose weight, follow low fat diet, follow low salt diet,Take 81mg aspirin daily  Patient Instructions   VIAP Activation    Thank you for requesting access to VIAP. Please follow the instructions below to securely access and download your online medical record. VIAP allows you to send messages to your doctor, view your test results, renew your prescriptions, schedule appointments, and more. How Do I Sign Up? 1. In your internet browser, go to [email or web address] or Fall  2. Click on the First Time User? Click Here link in the Sign In box. You will be redirect to the New Member Sign Up page. 3. Enter your VIAP Access Code exactly as it appears below. You will not need to use this code after youve completed the sign-up process.  If you do not sign up before the expiration date, you must request a new code. MyDemocracy Access Code: Activation code not generated  Current MyDemocracy Status: Patient Declined (This is the date your Helixbindt access code will )    4. Enter the last four digits of your Social Security Number (xxxx) and Date of Birth (mm/dd/yyyy) as indicated and click Submit. You will be taken to the next sign-up page. 5. Create a Helixbindt ID. This will be your MyDemocracy login ID and cannot be changed, so think of one that is secure and easy to remember. 6. Create a Helixbindt password. You can change your password at any time. 7. Enter your Password Reset Question and Answer. This can be used at a later time if you forget your password. 8. Enter your e-mail address. You will receive e-mail notification when new information is available in [address] 9. Click Sign Up. You can now view and download portions of your medical record. 10. Click the Download Summary menu link to download a portable copy of your medical information. Additional Information    If you have questions, please visit the Frequently Asked Questions section of the MyDemocracy website at [email or web address] Remember, MyDemocracy is NOT to be used for urgent needs. For medical emergencies, dial 911. Follow-up Disposition:  Return in about 3 months (around 2017), or if symptoms worsen or fail to improve. I have reviewed with the patient details of the assessment and plan and all questions were answered. Relevent patient education was performed. The most recent lab findings were reviewed with the patient. An After Visit Summary was printed and given to the patient.

## 2017-06-21 NOTE — PATIENT INSTRUCTIONS
[employer] Activation    Thank you for requesting access to Natero. Please follow the instructions below to securely access and download your online medical record. Natero allows you to send messages to your doctor, view your test results, renew your prescriptions, schedule appointments, and more. How Do I Sign Up? 1. In your internet browser, go to [email or web address]  2. Click on the First Time User? Click Here link in the Sign In box. You will be redirect to the New Member Sign Up page. 3. Enter your Natero Access Code exactly as it appears below. You will not need to use this code after youve completed the sign-up process. If you do not sign up before the expiration date, you must request a new code. Natero Access Code: Activation code not generated  Current Natero Status: Patient Declined (This is the date your Natero access code will )    4. Enter the last four digits of your Social Security Number (xxxx) and Date of Birth (mm/dd/yyyy) as indicated and click Submit. You will be taken to the next sign-up page. 5. Create a Natero ID. This will be your Natero login ID and cannot be changed, so think of one that is secure and easy to remember. 6. Create a Natero password. You can change your password at any time. 7. Enter your Password Reset Question and Answer. This can be used at a later time if you forget your password. 8. Enter your e-mail address. You will receive e-mail notification when new information is available in [address] 9. Click Sign Up. You can now view and download portions of your medical record. 10. Click the Download Summary menu link to download a portable copy of your medical information. Additional Information    If you have questions, please visit the Frequently Asked Questions section of the Natero website at [email or web address] Remember, Natero is NOT to be used for urgent needs. For medical emergencies, dial 911.

## 2017-06-21 NOTE — PROGRESS NOTES
[facility]  [address], [address] [facility]    OUTPATIENT physical Therapy      6/21/2017:  Cam Mosby was not seen on this date for physical therapy for the following reasons:    [x]     Patient called to cancel the visit for the following reasons: transportation  []     Patient missed the visit and did not call to cancel.     Maria R Zhou, PT

## 2017-06-21 NOTE — MR AVS SNAPSHOT
Visit Information Date & Time Provider Department Dept. Phone Encounter #  
 6/21/2017 10:45 AM Jermaine Ramos MD [address] [place] [address] [phone] [id number] Follow-up Instructions Return in about 3 months (around 9/21/2017), or if symptoms worsen or fail to improve. Your Appointments 6/22/2017 10:30 AM  
ROUTINE CARE with Jermaine Ramos MD  
[facility] - [address] [address] ([facility]) Appt Note: 4 week fu  
 [address] [facility] [facility] 7 [address]  
[phone]  
  
   
 [address] [place] [phone] Upcoming Health Maintenance Date Due  
 GLAUCOMA SCREENING Q2Y 11/22/2014 DTaP/Tdap/Td series (1 - Tdap) 4/26/2018* INFLUENZA AGE 9 TO ADULT 8/1/2017 Pneumococcal 65+ High/Highest Risk (2 of 2 - PPSV23) 10/8/2017 FOBT Q 1 YEAR AGE 50-75 5/16/2018 MEDICARE YEARLY EXAM 5/26/2018 *Topic was postponed. The date shown is not the original due date. Allergies as of 6/21/2017  Review Complete On: 6/21/2017 By: Jermaine Ramos MD  
 No Known Allergies Current Immunizations  Reviewed on 12/5/2013 Name Date Influenza Vaccine 12/5/2013 Influenza Vaccine Split 10/8/2012 Pneumococcal Vaccine (Unspecified Type) 10/8/2012 TB Skin Test (PPD) Intradermal  Incomplete Not reviewed this visit You Were Diagnosed With   
  
 Codes Comments Screening for tuberculosis    -  Primary ICD-10-CM: Z11.1 ICD-9-CM: V74.1 HIV (human immunodeficiency virus infection) ([facility]     ICD-10-CM: P35 ICD-9-CM: V08 Chronic hepatitis C without hepatic coma (HCC)     ICD-10-CM: B18.2 ICD-9-CM: 070.54 Hypothyroidism due to acquired atrophy of thyroid     ICD-10-CM: E03.4 ICD-9-CM: 244.8, 246.8 Vitamin D deficiency     ICD-10-CM: E55.9 ICD-9-CM: 268.9 Gait abnormality     ICD-10-CM: R26.9 ICD-9-CM: 781.2  Essential hypertension     ICD-10-CM: I10 
 ICD-9-CM: 401.9 Degenerative disc disease, lumbar     ICD-10-CM: M51.36 
ICD-9-CM: 722.52 Degenerative disc disease, cervical     ICD-10-CM: M50.30 ICD-9-CM: 722.4 Vitals BP Pulse Temp Resp Height(growth percentile) Weight(growth percentile) 136/70 (BP 1 Location: Right arm, BP Patient Position: Sitting) 72 97.6 °F (36.4 °C) (Oral) 19 5' 6\" (1.676 m) 133 lb (60.3 kg) SpO2 BMI Smoking Status 98% 21.47 kg/m2 Current Every Day Smoker BMI and BSA Data Body Mass Index Body Surface Area  
 21.47 kg/m 2 1.68 m 2 Preferred Pharmacy Pharmacy Name Phone [facility]Y #[id number]- [place], [place] [address] Your Updated Medication List  
  
   
This list is accurate as of: 6/21/17 12:27 PM.  Always use your most recent med list.  
  
  
  
  
 aspirin 81 mg chewable tablet Take 81 mg by mouth daily. b complex vitamins tablet Take 1 Tab by mouth daily. cholecalciferol 1,000 unit Cap Commonly known as:  VITAMIN D3 Take  by mouth daily. cyclobenzaprine 10 mg tablet Commonly known as:  FLEXERIL  
take 1 tablet by mouth twice a day if needed for muscle spasm FLUoxetine 10 mg capsule Commonly known as:  PROzac  
  
 fluPHENAZine decanoate 25 mg/mL injection Commonly known as:  PROLIXIN  
  
 * gabapentin 100 mg capsule Commonly known as:  NEURONTIN  
take 1 capsule by mouth three times a day * gabapentin 300 mg capsule Commonly known as:  NEURONTIN Take 1 Cap by mouth three (3) times daily. hydroCHLOROthiazide 25 mg tablet Commonly known as:  HYDRODIURIL  
take 1 tablet by mouth once daily  
  
 hydrocortisone 2.5 % rectal cream  
Commonly known as:  PROCTOSOL HC  
insert rectally four times a day  
  
 ibuprofen 800 mg tablet Commonly known as:  MOTRIN  
take 1 tablet by mouth twice a day with food  
  
 ondansetron 4 mg disintegrating tablet Commonly known as:  ZOFRAN ODT  
  
 oxyCODONE IR 5 mg immediate release tablet Commonly known as:  Michaell Lunch Senna 8.6 mg tablet Generic drug:  senna STOOL SOFTENER 100 mg capsule Generic drug:  docusate sodium * tamsulosin 0.4 mg capsule Commonly known as:  FLOMAX * tamsulosin 0.4 mg capsule Commonly known as:  FLOMAX Take 1 Cap by mouth daily. trihexyphenidyl 5 mg tablet Commonly known as:  ARTANE Take 5 mg by mouth three (3) times daily. * Malorie Peoples Commonly known as:  ULTRA-LIGHT ROLLATOR Use as directed daily * Malorie Peoples Commonly known as:  [address] Use as directed daily * Malorie Peoples Commonly known as:  [address] Use as needed * Notice: This list has 7 medication(s) that are the same as other medications prescribed for you. Read the directions carefully, and ask your doctor or other care provider to review them with you. Prescriptions Sent to Pharmacy Refills  
 hydrocortisone (PROCTOSOL HC) 2.5 % rectal cream 12 Sig: insert rectally four times a day Class: Normal  
 Pharmacy: [facility]/pharmacy #[id number] [place] [address], [address] [place] [address] #: [phone] We Performed the Following AMB POC TUBERCULOSIS, INTRADERMAL (SKIN TEST) [99054 CPT(R)] REFERRAL TO PHYSICAL THERAPY [XGP93 Custom] Follow-up Instructions Return in about 3 months (around 9/21/2017), or if symptoms worsen or fail to improve. Referral Information Referral ID Referred By Referred To  
  
 [id number] Meek MCDUFFIE Not Available Visits Status Start Date End Date 1 New Request 6/21/17 6/21/18 If your referral has a status of pending review or denied, additional information will be sent to support the outcome of this decision. Patient Instructions Parascalehart Activation Thank you for requesting access to [employer].  Please follow the instructions below to securely access and download your online medical record. Telsima allows you to send messages to your doctor, view your test results, renew your prescriptions, schedule appointments, and more. How Do I Sign Up? 1. In your internet browser, go to [email or web address] 
2. Click on the First Time User? Click Here link in the Sign In box. You will be redirect to the New Member Sign Up page. 3. Enter your Telsima Access Code exactly as it appears below. You will not need to use this code after youve completed the sign-up process. If you do not sign up before the expiration date, you must request a new code. Telsima Access Code: Activation code not generated Current Telsima Status: Patient Declined (This is the date your Telsima access code will ) 4. Enter the last four digits of your Social Security Number (xxxx) and Date of Birth (mm/dd/yyyy) as indicated and click Submit. You will be taken to the next sign-up page. 5. Create a Telsima ID. This will be your Telsima login ID and cannot be changed, so think of one that is secure and easy to remember. 6. Create a Telsima password. You can change your password at any time. 7. Enter your Password Reset Question and Answer. This can be used at a later time if you forget your password. 8. Enter your e-mail address. You will receive e-mail notification when new information is available in [address] 9. Click Sign Up. You can now view and download portions of your medical record. 10. Click the Download Summary menu link to download a portable copy of your medical information. Additional Information If you have questions, please visit the Frequently Asked Questions section of the Telsima website at [email or web address] Remember, Telsima is NOT to be used for urgent needs. For medical emergencies, dial 911. Please provide this summary of care documentation to your next provider. Your primary care clinician is listed as Olimpia Alejandre. If you have any questions after today's visit, please call [phone].

## 2017-06-23 ENCOUNTER — CLINICAL SUPPORT (OUTPATIENT)
Dept: INTERNAL MEDICINE CLINIC | Age: 67
End: 2017-06-23

## 2017-06-23 ENCOUNTER — HOSPITAL ENCOUNTER (OUTPATIENT)
Dept: GENERAL RADIOLOGY | Age: 67
Discharge: HOME | End: 2017-06-23
Payer: MEDICAID

## 2017-06-23 ENCOUNTER — HOSPITAL ENCOUNTER (OUTPATIENT)
Dept: PHYSICAL THERAPY | Age: 67
Discharge: HOME | End: 2017-06-23
Payer: MEDICAID

## 2017-06-23 DIAGNOSIS — Z20.1 EXPOSURE TO TB: Primary | ICD-10-CM

## 2017-06-23 DIAGNOSIS — Z20.1 EXPOSURE TO TB: ICD-10-CM

## 2017-06-23 LAB
MM INDURATION POC: 25 MM (ref 0–15)
PPD POC: POSITIVE NEGATIVE

## 2017-06-23 PROCEDURE — 97110 THERAPEUTIC EXERCISES: CPT | Performed by: PHYSICAL THERAPIST

## 2017-06-23 PROCEDURE — 71020 XR CHEST PA LAT: CPT

## 2017-06-23 NOTE — MR AVS SNAPSHOT
Visit Information Date & Time Provider Department Dept. Phone Encounter #  
 6/23/2017 12:00 PM Albert Evans MD [address] [facility] [phone] [id number] Follow-up Instructions Return in about 1 week (around 6/30/2017), or if symptoms worsen or fail to improve. Follow-up and Disposition History Your Appointments 9/21/2017 11:00 AM  
ROUTINE CARE with Albert Evans MD  
[facility] - Yashira Sanchez ([address] [facility]) Appt Note: 3 month fu  
 [address] [facility],[address] [facility] 7 [address]  
[phone]  
  
   
 [address] 7 [address] Upcoming Health Maintenance Date Due  
 GLAUCOMA SCREENING Q2Y 11/22/2014 DTaP/Tdap/Td series (1 - Tdap) 4/26/2018* INFLUENZA AGE 9 TO ADULT 8/1/2017 Pneumococcal 65+ High/Highest Risk (2 of 2 - PPSV23) 10/8/2017 FOBT Q 1 YEAR AGE 50-75 5/16/2018 MEDICARE YEARLY EXAM 5/26/2018 *Topic was postponed. The date shown is not the original due date. Allergies as of 6/23/2017  Review Complete On: 6/21/2017 By: Albert Evans MD  
 No Known Allergies Current Immunizations  Reviewed on 12/5/2013 Name Date Influenza Vaccine 12/5/2013 Influenza Vaccine Split 10/8/2012 Pneumococcal Vaccine (Unspecified Type) 10/8/2012 TB Skin Test (PPD) Intradermal 6/21/2017 Not reviewed this visit You Were Diagnosed With   
  
 Codes Comments Exposure to TB    -  Primary ICD-10-CM: Z20.1 ICD-9-CM: V01.1 Vitals Smoking Status Current Every Day Smoker Preferred Pharmacy Pharmacy Name Phone [id number]- [address] Your Updated Medication List  
  
   
This list is accurate as of: 6/23/17 12:25 PM.  Always use your most recent med list.  
  
  
  
  
 aspirin 81 mg chewable tablet Take 81 mg by mouth daily. b complex vitamins tablet Take 1 Tab by mouth daily. cholecalciferol 1,000 unit Cap Commonly known as:  VITAMIN D3 Take  by mouth daily. cyclobenzaprine 10 mg tablet Commonly known as:  FLEXERIL  
take 1 tablet by mouth twice a day if needed for muscle spasm FLUoxetine 10 mg capsule Commonly known as:  PROzac  
  
 fluPHENAZine decanoate 25 mg/mL injection Commonly known as:  PROLIXIN  
  
 * gabapentin 100 mg capsule Commonly known as:  NEURONTIN  
take 1 capsule by mouth three times a day * gabapentin 300 mg capsule Commonly known as:  NEURONTIN Take 1 Cap by mouth three (3) times daily. hydroCHLOROthiazide 25 mg tablet Commonly known as:  HYDRODIURIL  
take 1 tablet by mouth once daily  
  
 hydrocortisone 2.5 % rectal cream  
Commonly known as:  PROCTOSOL HC  
insert rectally four times a day  
  
 ibuprofen 800 mg tablet Commonly known as:  MOTRIN  
take 1 tablet by mouth twice a day with food  
  
 ondansetron 4 mg disintegrating tablet Commonly known as:  ZOFRAN ODT  
  
 oxyCODONE IR 5 mg immediate release tablet Commonly known as:  Meg Trina Senna 8.6 mg tablet Generic drug:  senna STOOL SOFTENER 100 mg capsule Generic drug:  docusate sodium * tamsulosin 0.4 mg capsule Commonly known as:  FLOMAX * tamsulosin 0.4 mg capsule Commonly known as:  FLOMAX Take 1 Cap by mouth daily. trihexyphenidyl 5 mg tablet Commonly known as:  ARTANE Take 5 mg by mouth three (3) times daily. * Palmer Robert Commonly known as:  ULTRA-LIGHT ROLLATOR Use as directed daily * Palmer Robert Commonly known as:  [address] [employer] Use as directed daily * Vinicius Robert Commonly known as:  [address] [employer] [employer] Use as needed * Notice: This list has 7 medication(s) that are the same as other medications prescribed for you.  Read the directions carefully, and ask your doctor or other care provider to review them with you. Follow-up Instructions Return in about 1 week (around 6/30/2017), or if symptoms worsen or fail to improve. To-Do List   
 06/23/2017 Imaging:  XR CHEST PA LAT Please provide this summary of care documentation to your next provider. Your primary care clinician is listed as Colton Espinoza. If you have any questions after today's visit, please call [phone].

## 2017-06-23 NOTE — PROGRESS NOTES
[facility]  [address], [address] [place]    OUTPATIENT physical Therapy DAILY Treatment NOTe  Visit: 10    NAME: Jeane Parish AGE: 78 y.o. GENDER: male  DATE: 6/23/2017  REFERRING PHYSICIAN: Radha Rivero MD        GOALS  Short term goals  Time frame: 5 weeks  1. Patient will be compliance and independent with the initial HEP as evidenced by being able to perform without cuing. MET  2. Patient tolerate 15 minutes of clinic exercise. MET  3. Report of fewer than 1 fall / week. MET        4. Patient will report decrease in neck pain 4/10 MET    Long term goals  Time frame: 10 weeks  1. Patient will reports pain level decreased to 2/10 to allow increased ease of movement. MET  2. Patient will have an improved score on the LEFS outcome measure by 10 points to demonstrate an increase in functional activity tolerance. MET  3. Patient will be independent in final individualized HEP. 4.Patient will report fewer than 1 fall/ month            SUBJECTIVE:   \"My neck isn't sore today\"    No falls reported this week.     OBJECTIVE DATA SUMMARY:   Updated 6/23/17:  Pain:  Location: neck and upper back  Quality: sharp  Now: 0/10  Worst: 10/10  Factors that improve pain: rest    Posture:   Patient with flexed trunk    Strength:   4/5 throughout BLE    Spinal Assessment:   Cervical Spine (AROM)  Flexion  WNL  Extension WNL  R side bend 25% limited  L side bend 25% limited  R rotation 25% limited  L rotation 25% limited    Palpation:   Mild tenderness to b/l UT, rhomboids, levator, cx paraspinals    Neurologic Assessment:   Tone: increased LE tone   Sensation: WFL   Reflexes: not tested    Mobility:   Transitional Movements: increased time to perform    Gait: Pt ambulates with walker, crouched gait and decreased toe clearance noted     Balance:  Poor, improved with r/w    Functional Measure:   LEFS: 33/80 on evaluation   LEFS: 46/80 on 6/23/17     TREATMENT/INTERVENTION:  Modalities (Rationale):  to decrease pain and muscle guarding  MHP to cervical spine for 10 minutes post treatment     Therapeutic Exercises:  HEP provided on evaluation:  Isometric Cx extension, Cx rotation stretch, Shoulder blade squeeze,   Hip abduction ,SLR, Shallow knee bends in walker, Heel/toe raises    Exercises in BOLD performed this date:    Standing:  Steamboats (hip abduction and extension): 15 reps B  Marches: 15 reps B  Mini squats: 15 reps  Toe raise/heel raise  Heel/toe on rocker board, double and SLS (CGA)    Supine:   SLR: 10 reps B  Chin tucks: 10 reps   BKTC on yellow theraball  HS passive x 5 reps  Clamshells x 10 reps 2 sets    Seated:   LAQ x 10 reps with 2#  Marching in place 2#  Hip adductor squeeze with ball: 10 reps with 10 second holds  Hip abduction with green TB: 10 reps  Toe taps on cones: 2 minutes  Cervical rotation and sidebend: 10 reps   Pull-downs at Quantum 15#: 10 reps  Pull backs 15#: 10 reps    Manual Therapy:  Gentle STM to c/s paraspinals and UT     Neuro Re-Education:  Discussed balance challenges to decrease falls    Balance Training:  EC NBOS, perturbations  SLS x 5 sec alternating, 1 hand for balance  EC SLS    SLS on green foam    Ambulation/Gait Training:  Patient ambulated 50 feet with rollator walker this date. Patient demonstrates steadier gait with front wheeled walker. Activity tolerance and post treatment pain report: Tolerated well; 0/10 neck pain     Education:  Education was provided to the patient on the following topics: Safe use of r/w. [x]    No changes were made to the home exercise program.  []    The following changes were made to the home exercise program:   Patient verbalized understanding of the topics presented. ASSESSMENT:   Patient s/p cervical discotomy in November 2016. Patient without complaints of neck pain this date.  Patient with improved activity tolerance when compared to previous sessions, but continues to require increased rest breaks secondary to BLE fatigue. Patient with improvement with BLE strength and cervical spine AROM since evaluation. Patient with improved LEFS score from 33/80 on evaluation to 46/80 today. Patient presents with new referral from MD to continue physical therapy for cervical spine, lumbar spine, and gait abnormalities. Patient's  Kennedy Alegre not present for any of today's treatment session to discuss plan of care. Patients progression toward goals is as follows:  []     Improving appropriately and progressing toward goals  [x]     Improving slowly and progressing toward goals  []     Not making progress toward goals and plan of care will be adjusted    PLAN OF CARE:   Patient continues to benefit from skilled intervention to address the above impairments. [x]    Continue treatment per established plan of care.   []     Recommend the following changes to the plan of care:     Recommendations/Intent for next treatment: Increase neuromuscular challenges    Desmond Lopez, PT   Time Calculation: 40 mins  Patient Time in clinic:   Start Time: 1050   Stop Time: 1130

## 2017-06-28 ENCOUNTER — HOSPITAL ENCOUNTER (OUTPATIENT)
Dept: PHYSICAL THERAPY | Age: 67
Discharge: HOME | End: 2017-06-28
Payer: MEDICAID

## 2017-06-28 PROCEDURE — 97110 THERAPEUTIC EXERCISES: CPT

## 2017-06-28 NOTE — PROGRESS NOTES
[facility]  [address], [address] [facility]    OUTPATIENT physical Therapy DAILY Treatment NOTe  Visit: 11    NAME: Janet Batsheva AGE: 78 y.o. GENDER: male  DATE: 6/28/2017  REFERRING PHYSICIAN: Melony Rai MD        GOALS  Short term goals  Time frame: 5 weeks  1. Patient will be compliance and independent with the initial HEP as evidenced by being able to perform without cuing. MET  2. Patient tolerate 15 minutes of clinic exercise. MET  3. Report of fewer than 1 fall / week. MET        4. Patient will report decrease in neck pain 4/10 MET    Long term goals  Time frame: 10 weeks  1. Patient will reports pain level decreased to 2/10 to allow increased ease of movement. MET  2. Patient will have an improved score on the LEFS outcome measure by 10 points to demonstrate an increase in functional activity tolerance. MET  3. Patient will be independent in final individualized HEP. 4.Patient will report fewer than 1 fall/ month            SUBJECTIVE:   \"I don't feel good today\"    No falls reported this week.     OBJECTIVE DATA SUMMARY:   Updated 6/23/17:  Pain:  Location: neck and upper back  Quality: sharp  Now: 0/10  Worst: 10/10  Factors that improve pain: rest    Posture:   Patient with flexed trunk    Strength:   4/5 throughout BLE    Spinal Assessment:   Cervical Spine (AROM)  Flexion  WNL  Extension WNL  R side bend 25% limited  L side bend 25% limited  R rotation 25% limited  L rotation 25% limited    Palpation:   Mild tenderness to b/l UT, rhomboids, levator, cx paraspinals    Neurologic Assessment:   Tone: increased LE tone   Sensation: WFL   Reflexes: not tested    Mobility:   Transitional Movements: increased time to perform    Gait: Pt ambulates with walker, crouched gait and decreased toe clearance noted     Balance:  Poor, improved with r/w    Functional Measure:   LEFS: 33/80 on evaluation   LEFS: 46/80 on 6/23/17     TREATMENT/INTERVENTION:  Modalities (Rationale):  to decrease pain and muscle guarding  MHP to cervical spine for 10 minutes post treatment     Therapeutic Exercises:  HEP provided on evaluation:  Isometric Cx extension, Cx rotation stretch, Shoulder blade squeeze,   Hip abduction ,SLR, Shallow knee bends in walker, Heel/toe raises    Exercises in BOLD performed this date:    Standing:  Steamboats (hip abduction and extension): 15 reps B  Marches: 15 reps B  Mini squats: 15 reps  Toe raise/heel raise  Heel/toe on rocker board, double and SLS (CGA)    Supine:   SLR: 10 reps B  Chin tucks: 10 reps   BKTC on yellow theraball  HS passive x 5 reps  Clamshells x 10 reps 2 sets    Seated:   LAQ x 10 reps with 2#  Marching in place 2#  Hip adductor squeeze with ball: 10 reps with 10 second holds  Hip abduction with green TB: 10 reps  Toe taps on cones: 2 minutes  Cervical rotation and sidebend: 10 reps   Pull-downs at Quantum 15#: 10 reps  Pull backs 15#: 10 reps    Manual Therapy:  Gentle STM to c/s paraspinals and UT     Neuro Re-Education:  Discussed balance challenges to decrease falls    Balance Training:  EC NBOS, perturbations  SLS x 5 sec alternating, 1 hand for balance  EC SLS    SLS on green foam    Ambulation/Gait Training:  Patient ambulated 50 feet with rollator walker this date. Patient demonstrates steadier gait with front wheeled walker. Activity tolerance and post treatment pain report: Tolerated well; 0/10 neck pain     Education:  Education was provided to the patient on the following topics: Safe use of r/w. [x]    No changes were made to the home exercise program.  []    The following changes were made to the home exercise program:   Patient verbalized understanding of the topics presented. ASSESSMENT:   Patient s/p cervical discotomy in November 2016. Patient with complaints of neck and back pain this date, he reports not feeling well but consented to continue with Therapy.   Patient with improved activity tolerance when compared to previous sessions. Patient with improvement with BLE strength and cervical spine AROM since evaluation. LEFS score from 33/80  Patient has a  referral from MD to continue physical therapy for cervical spine, lumbar spine, and gait abnormalities. Pt ambulated with rollator today, improved safety awareness. He reports that he is moving tomorrow to an [employer] where he will have meals and more private space. Patients progression toward goals is as follows:  []     Improving appropriately and progressing toward goals  [x]     Improving slowly and progressing toward goals  []     Not making progress toward goals and plan of care will be adjusted    PLAN OF CARE:   Patient continues to benefit from skilled intervention to address the above impairments. [x]    Continue treatment per established plan of care.   []     Recommend the following changes to the plan of care:     Recommendations/Intent for next treatment: Increase neuromuscular challenges    Abhishek Dale PT   Time Calculation: 45 mins  Patient Time in clinic:   Start Time: 0113   Stop Time: 1120

## 2017-06-29 ENCOUNTER — OFFICE VISIT (OUTPATIENT)
Dept: INTERNAL MEDICINE CLINIC | Age: 67
End: 2017-06-29

## 2017-06-29 VITALS
WEIGHT: 128 LBS | RESPIRATION RATE: 16 BRPM | SYSTOLIC BLOOD PRESSURE: 120 MMHG | OXYGEN SATURATION: 96 % | HEIGHT: 66 IN | DIASTOLIC BLOOD PRESSURE: 80 MMHG | HEART RATE: 81 BPM | BODY MASS INDEX: 20.57 KG/M2 | TEMPERATURE: 98 F

## 2017-06-29 DIAGNOSIS — E55.9 VITAMIN D DEFICIENCY: ICD-10-CM

## 2017-06-29 DIAGNOSIS — B20 HIV (HUMAN IMMUNODEFICIENCY VIRUS INFECTION) (HCC): ICD-10-CM

## 2017-06-29 DIAGNOSIS — B18.2 CHRONIC HEPATITIS C WITHOUT HEPATIC COMA (HCC): ICD-10-CM

## 2017-06-29 DIAGNOSIS — E03.4 HYPOTHYROIDISM DUE TO ACQUIRED ATROPHY OF THYROID: ICD-10-CM

## 2017-06-29 DIAGNOSIS — Z20.1 EXPOSURE TO TB: Primary | ICD-10-CM

## 2017-06-29 RX ORDER — ISONIAZID 300 MG/1
300 TABLET ORAL DAILY
Qty: 30 TAB | Refills: 9 | Status: SHIPPED | OUTPATIENT
Start: 2017-06-29 | End: 2018-03-22 | Stop reason: ALTCHOICE

## 2017-06-29 NOTE — MR AVS SNAPSHOT
Visit Information Date & Time Provider Department Dept. Phone Encounter #  
 6/29/2017  1:30 PM Georges Bamberger., MD [facility] [phone] [id number] Follow-up Instructions Return in about 4 weeks (around 7/27/2017), or if symptoms worsen or fail to improve. Follow-up and Disposition History Your Appointments 9/21/2017 11:00 AM  
ROUTINE CARE with Georges Bamberger., MD  
[facility] - [address] [place] [address] ([address]) Appt Note: 3 month fu  
 [facility],[address] [facility] 7 [address]  
[phone]  
  
   
 [address] [place] [address] 7 [phone] Upcoming Health Maintenance Date Due  
 GLAUCOMA SCREENING Q2Y 11/22/2014 DTaP/Tdap/Td series (1 - Tdap) 4/26/2018* INFLUENZA AGE 9 TO ADULT 8/1/2017 Pneumococcal 65+ High/Highest Risk (2 of 2 - PPSV23) 10/8/2017 FOBT Q 1 YEAR AGE 50-75 5/16/2018 MEDICARE YEARLY EXAM 5/26/2018 *Topic was postponed. The date shown is not the original due date. Allergies as of 6/29/2017  Review Complete On: 6/29/2017 By: Georges Bamberger., MD  
 No Known Allergies Current Immunizations  Reviewed on 12/5/2013 Name Date Influenza Vaccine 12/5/2013 Influenza Vaccine Split 10/8/2012 Pneumococcal Vaccine (Unspecified Type) 10/8/2012 TB Skin Test (PPD) Intradermal 6/21/2017 Not reviewed this visit You Were Diagnosed With   
  
 Codes Comments Exposure to TB    -  Primary ICD-10-CM: Z20.1 ICD-9-CM: V01.1 HIV [facility]human immunodeficiency virus infection) [facility]     ICD-10-CM: K87 ICD-9-CM: V08 Chronic hepatitis C without hepatic coma (HCC)     ICD-10-CM: B18.2 ICD-9-CM: 070.54 Hypothyroidism due to acquired atrophy of thyroid     ICD-10-CM: E03.4 ICD-9-CM: 244.8, 246.8 Vitamin D deficiency     ICD-10-CM: E55.9 ICD-9-CM: 268.9 Vitals BP Pulse Temp Resp Height(growth percentile) Weight(growth percentile) 120/80 81 98 °F (36.7 °C) (Oral) 16 5' 6\" (1.676 m) 128 lb (58.1 kg) SpO2 BMI Smoking Status 96% 20.66 kg/m2 Current Every Day Smoker BMI and BSA Data Body Mass Index Body Surface Area  
 20.66 kg/m 2 1.64 m 2 Preferred Pharmacy Pharmacy Name Phone [facility]Y #[id number]- [facility] Your Updated Medication List  
  
   
This list is accurate as of: 6/29/17  2:36 PM.  Always use your most recent med list.  
  
  
  
  
 aspirin 81 mg chewable tablet Take 81 mg by mouth daily. b complex vitamins tablet Take 1 Tab by mouth daily. cholecalciferol 1,000 unit Cap Commonly known as:  VITAMIN D3 Take  by mouth daily. cyclobenzaprine 10 mg tablet Commonly known as:  FLEXERIL  
take 1 tablet by mouth twice a day if needed for muscle spasm FLUoxetine 10 mg capsule Commonly known as:  PROzac  
  
 fluPHENAZine decanoate 25 mg/mL injection Commonly known as:  PROLIXIN  
  
 * gabapentin 100 mg capsule Commonly known as:  NEURONTIN  
take 1 capsule by mouth three times a day * gabapentin 300 mg capsule Commonly known as:  NEURONTIN Take 1 Cap by mouth three (3) times daily. hydroCHLOROthiazide 25 mg tablet Commonly known as:  HYDRODIURIL  
take 1 tablet by mouth once daily  
  
 hydrocortisone 2.5 % rectal cream  
Commonly known as:  PROCTOSOL HC  
insert rectally four times a day  
  
 ibuprofen 800 mg tablet Commonly known as:  MOTRIN  
take 1 tablet by mouth twice a day with food  
  
 isoniazid 300 mg tablet Commonly known as:  NYDRAZID Take 1 Tab by mouth daily. ondansetron 4 mg disintegrating tablet Commonly known as:  ZOFRAN ODT  
  
 oxyCODONE IR 5 mg immediate release tablet Commonly known as:  Carmella Anderson Senna 8.6 mg tablet Generic drug:  senna STOOL SOFTENER 100 mg capsule Generic drug:  docusate sodium * tamsulosin 0.4 mg capsule Commonly known as:  FLOMAX * tamsulosin 0.4 mg capsule Commonly known as:  FLOMAX Take 1 Cap by mouth daily. trihexyphenidyl 5 mg tablet Commonly known as:  ARTANE Take 5 mg by mouth three (3) times daily. * Malorie Peoples Commonly known as:  ULTRA-LIGHT ROLLATOR Use as directed daily * Malorie Peoples Commonly known as:  [address] Use as directed daily * Malorie Peoples Commonly known as:  [address] Use as needed * Notice: This list has 7 medication(s) that are the same as other medications prescribed for you. Read the directions carefully, and ask your doctor or other care provider to review them with you. Prescriptions Sent to Pharmacy Refills  
 isoniazid (NYDRAZID) 300 mg tablet 9 Sig: Take 1 Tab by mouth daily. Class: Normal  
 Pharmacy: [facility]/pharmacy #[id number]- [place], [address] [place] [address] #: [phone] Route: Oral  
  
Follow-up Instructions Return in about 4 weeks (around 7/27/2017), or if symptoms worsen or fail to improve. Patient Instructions TransNet Activation Thank you for requesting access to TransNet. Please follow the instructions below to securely access and download your online medical record. TransNet allows you to send messages to your doctor, view your test results, renew your prescriptions, schedule appointments, and more. How Do I Sign Up? 1. In your internet browser, go to [email or web address] 
2. Click on the First Time User? Click Here link in the Sign In box. You will be redirect to the New Member Sign Up page. 3. Enter your TransNet Access Code exactly as it appears below. You will not need to use this code after youve completed the sign-up process. If you do not sign up before the expiration date, you must request a new code. TransNet Access Code: Activation code not generated Current TransNet Status: Patient Declined (This is the date your TransNet access code will ) 4. Enter the last four digits of your Social Security Number (xxxx) and Date of Birth (mm/dd/yyyy) as indicated and click Submit. You will be taken to the next sign-up page. 5. Create a 4Tech ID. This will be your 4Tech login ID and cannot be changed, so think of one that is secure and easy to remember. 6. Create a 4Tech password. You can change your password at any time. 7. Enter your Password Reset Question and Answer. This can be used at a later time if you forget your password. 8. Enter your e-mail address. You will receive e-mail notification when new information is available in [address] 9. Click Sign Up. You can now view and download portions of your medical record. 10. Click the Download Summary menu link to download a portable copy of your medical information. Additional Information If you have questions, please visit the Frequently Asked Questions section of the 4Tech website at [email or web address] Remember, 4Tech is NOT to be used for urgent needs. For medical emergencies, dial 911. Please provide this summary of care documentation to your next provider. Your primary care clinician is listed as Yaritza Plaster. If you have any questions after today's visit, please call [phone].

## 2017-06-29 NOTE — PATIENT INSTRUCTIONS
[employer]Biolase Activation    Thank you for requesting access to Grand Perfecta. Please follow the instructions below to securely access and download your online medical record. Grand Perfecta allows you to send messages to your doctor, view your test results, renew your prescriptions, schedule appointments, and more. How Do I Sign Up? 1. In your internet browser, go to [email or web address]  2. Click on the First Time User? Click Here link in the Sign In box. You will be redirect to the New Member Sign Up page. 3. Enter your Grand Perfecta Access Code exactly as it appears below. You will not need to use this code after youve completed the sign-up process. If you do not sign up before the expiration date, you must request a new code. Grand Perfecta Access Code: Activation code not generated  Current Grand Perfecta Status: Patient Declined (This is the date your Grand Perfecta access code will )    4. Enter the last four digits of your Social Security Number (xxxx) and Date of Birth (mm/dd/yyyy) as indicated and click Submit. You will be taken to the next sign-up page. 5. Create a Grand Perfecta ID. This will be your Grand Perfecta login ID and cannot be changed, so think of one that is secure and easy to remember. 6. Create a Grand Perfecta password. You can change your password at any time. 7. Enter your Password Reset Question and Answer. This can be used at a later time if you forget your password. 8. Enter your e-mail address. You will receive e-mail notification when new information is available in [address] 9. Click Sign Up. You can now view and download portions of your medical record. 10. Click the Download Summary menu link to download a portable copy of your medical information. Additional Information    If you have questions, please visit the Frequently Asked Questions section of the Grand Perfecta website at [email or web address] Remember, Grand Perfecta is NOT to be used for urgent needs. For medical emergencies, dial 911.

## 2017-06-29 NOTE — PROGRESS NOTES
Shadi Joseph is a 78 y.o. male and presents with Results (xray result from positive PPD) and Other (fill out forms)  . Subjective:  Hypertension Review:  The patient has hypertension  Diet and Lifestyle: generally follows a  low sodium diet, exercises sporadically  Home BP Monitoring: is not measured at home. Pertinent ROS: taking medications as instructed, no medication side effects noted, no TIA's, no chest pain on exertion, no dyspnea on exertion, no swelling of ankles. He recently had cervical disc surgery and is undergoing physical therapy presently  He has seen neurosurgery with no new recomendations    He has a history of hiv.he is under treatment    He has had poor gait and need PT    He has had a recent positive tb skin test and a negative chest xray      Review of Systems  Constitutional: negative for fevers, chills, anorexia and weight loss  Eyes:   negative for visual disturbance and irritation  ENT:   negative for tinnitus,sore throat,nasal congestion,ear pains. hoarseness  Respiratory:  negative for cough, hemoptysis, dyspnea,wheezing  CV:   negative for chest pain, palpitations, lower extremity edema  GI:   negative for nausea, vomiting, diarrhea, abdominal pain,melena  Endo:               negative for polyuria,polydipsia,polyphagia,heat intolerance  Genitourinary: negative for frequency, dysuria and hematuria  Integument:  negative for rash and pruritus  Hematologic:  negative for easy bruising and gum/nose bleeding  Musculoskel: negative for myalgias, arthralgias, back pain, muscle weakness, joint pain  Neurological:  negative for headaches, dizziness, vertigo, memory problems and gait   Behavl/Psych: negative for feelings of anxiety, depression, mood changes    Past Medical History:   Diagnosis Date    Chronic hepatitis C without mention of hepatic coma 4/7/2011    Degenerative Joint Disease 7/13/2011    Hematochezia 4/7/2011    HIV (human immunodeficiency virus infection) ([facility] 4/7/2011    Hypetension 4/7/2011    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 arm surgery due to abscess    HX OTHER SURGICAL      flank mole removal     Social History     Social History    Marital status: SINGLE     Spouse name: N/A    Number of children: N/A    Years of education: N/A     Social History Main Topics    Smoking status: Current Every Day Smoker     Packs/day: 0.25    Smokeless tobacco: Never Used    Alcohol use No    Drug use: No    Sexual activity: Not Asked     Other Topics Concern    None     Social History Narrative     Family History   Problem Relation Age of Onset    Kidney Disease Mother     Diabetes Mother     No Known Problems Father     Heart Disease Sister     Kidney Disease Sister     Diabetes Brother     Heart Disease Brother     Cancer Brother     Alcohol abuse Brother     Obesity Brother     Other Brother      Drugs     Current Outpatient Prescriptions   Medication Sig Dispense Refill    isoniazid (NYDRAZID) 300 mg tablet Take 1 Tab by mouth daily. 30 Tab 9    hydrocortisone (PROCTOSOL HC) 2.5 % rectal cream insert rectally four times a day 28.35 g 12    ibuprofen (MOTRIN) 800 mg tablet take 1 tablet by mouth twice a day with food 60 Tab 12    hydroCHLOROthiazide (HYDRODIURIL) 25 mg tablet take 1 tablet by mouth once daily 30 Tab 12    gabapentin (NEURONTIN) 300 mg capsule Take 1 Cap by mouth three (3) times daily. 90 Cap 12    tamsulosin (FLOMAX) 0.4 mg capsule       fluPHENAZine decanoate (PROLIXIN) 25 mg/mL injection       tamsulosin (FLOMAX) 0.4 mg capsule Take 1 Cap by mouth daily.  30 Cap 12    cyclobenzaprine (FLEXERIL) 10 mg tablet take 1 tablet by mouth twice a day if needed for muscle spasm 90 Tab 3    Walker (MAYURI ROLLING WALKER) misc Use as needed 1 Each 0    Walker (MAYURI ROLLING WALKER) misc Use as directed daily 1 Each 0    Walker (ULTRA-LIGHT ROLLATOR) misc Use as directed daily 1 Each 0    b complex vitamins tablet Take 1 Tab by mouth daily.  cholecalciferol (VITAMIN D3) 1,000 unit cap Take  by mouth daily.  FLUoxetine (PROZAC) 10 mg capsule   0    trihexyphenidyl (ARTANE) 5 mg tablet Take 5 mg by mouth three (3) times daily.  STOOL SOFTENER 100 mg capsule       oxyCODONE IR (ROXICODONE) 5 mg immediate release tablet       ondansetron (ZOFRAN ODT) 4 mg disintegrating tablet       SENNA 8.6 mg tablet       gabapentin (NEURONTIN) 100 mg capsule take 1 capsule by mouth three times a day 90 Cap 12    aspirin 81 mg chewable tablet Take 81 mg by mouth daily. No Known Allergies    Objective:  Visit Vitals    /80    Pulse 81    Temp 98 °F (36.7 °C) (Oral)    Resp 16    Ht 5' 6\" (1.676 m)    Wt 128 lb (58.1 kg)    SpO2 96%    BMI 20.66 kg/m2     Physical Exam:   General appearance - alert, well appearing, and in no distress  Mental status - alert, oriented to person, place, and time  EYE-SHANNAN, EOMI, corneas normal, no foreign bodies  ENT-ENT exam normal, no neck nodes or sinus tenderness  Nose - normal and patent, no erythema, discharge or polyps  Mouth - mucous membranes moist, pharynx normal without lesions  Neck - supple, no significant adenopathy   Chest - clear to auscultation, no wheezes, rales or rhonchi, symmetric air entry   Heart - normal rate, regular rhythm, normal S1, S2, no murmurs, rubs, clicks or gallops   Abdomen - soft, nontender, nondistended, no masses or organomegaly  Lymph- no adenopathy palpable  Ext-peripheral pulses normal, no pedal edema, no clubbing or cyanosis  Skin-Warm and dry.  no hyperpigmentation, vitiligo, or suspicious lesions  Neuro -alert, oriented, normal speech, no focal findings or movement disorder noted  Neck-normal C-spine, no tenderness, full ROM without pain  Feet-no nail deformities or callus formation with good pulses noted      Results for orders placed or performed in visit on 17   AMB POC TUBERCULOSIS, INTRADERMAL (SKIN TEST)   Result Value Ref Range    PPD positive Negative    mm Induration 25 (A) 15 mm       Assessment/Plan:    ICD-10-CM ICD-9-CM    1. Exposure to TB Z20.1 V01.1    2. HIV (human immunodeficiency virus infection) ([facility] Z21 V08    3. Chronic hepatitis C without hepatic coma (HCC) B18.2 070.54    4. Hypothyroidism due to acquired atrophy of thyroid E03.4 244.8      246.8    5. Vitamin D deficiency E55.9 268.9      Orders Placed This Encounter    isoniazid (NYDRAZID) 300 mg tablet     Sig: Take 1 Tab by mouth daily. Dispense:  30 Tab     Refill:  9     lose weight, follow low fat diet, follow low salt diet,Take 81mg aspirin daily  Patient Instructions   [email or web address] (Abundant Closet) Activation    Thank you for requesting access to [email or web address] (Abundant Closet). Please follow the instructions below to securely access and download your online medical record. [email or web address] (Abundant Closet) allows you to send messages to your doctor, view your test results, renew your prescriptions, schedule appointments, and more. How Do I Sign Up? 1. In your internet browser, go to [email or web address]  2. Click on the First Time User? Click Here link in the Sign In box. You will be redirect to the New Member Sign Up page. 3. Enter your [email or web address] (Abundant Closet) Access Code exactly as it appears below. You will not need to use this code after youve completed the sign-up process. If you do not sign up before the expiration date, you must request a new code. [email or web address] (Abundant Closet) Access Code: Activation code not generated  Current [email or web address] (Abundant Closet) Status: Patient Declined (This is the date your [email or web address] (Abundant Closet) access code will )    4. Enter the last four digits of your Social Security Number (xxxx) and Date of Birth (mm/dd/yyyy) as indicated and click Submit.  You will be taken to the next sign-up page.  5. Create a Definicaret ID. This will be your Promoter.io login ID and cannot be changed, so think of one that is secure and easy to remember. 6. Create a Promoter.io password. You can change your password at any time. 7. Enter your Password Reset Question and Answer. This can be used at a later time if you forget your password. 8. Enter your e-mail address. You will receive e-mail notification when new information is available in [address] 9. Click Sign Up. You can now view and download portions of your medical record. 10. Click the Download Summary menu link to download a portable copy of your medical information. Additional Information    If you have questions, please visit the Frequently Asked Questions section of the [email or web address] website at [email or web address] Remember, [email or web address] is NOT to be used for urgent needs. For medical emergencies, dial 911. Follow-up Disposition:  Return in about 4 weeks (around 7/27/2017), or if symptoms worsen or fail to improve. I have reviewed with the patient details of the assessment and plan and all questions were answered. Relevent patient education was performed. The most recent lab findings were reviewed with the patient. An After Visit Summary was printed and given to the patient.

## 2017-06-30 LAB
ALBUMIN SERPL-MCNC: 4 G/DL (ref 3.6–4.8)
ALBUMIN/GLOB SERPL: 1.1 {RATIO} (ref 1.2–2.2)
ALP SERPL-CCNC: 78 IU/L (ref 39–117)
ALT SERPL-CCNC: 22 IU/L (ref 0–44)
AST SERPL-CCNC: 31 IU/L (ref 0–40)
BILIRUB SERPL-MCNC: 0.5 MG/DL (ref 0–1.2)
BUN SERPL-MCNC: 16 MG/DL (ref 8–27)
BUN/CREAT SERPL: 17 (ref 10–24)
CALCIUM SERPL-MCNC: 9.5 MG/DL (ref 8.6–10.2)
CHLORIDE SERPL-SCNC: 101 MMOL/L (ref 96–106)
CO2 SERPL-SCNC: 23 MMOL/L (ref 18–29)
CREAT SERPL-MCNC: 0.92 MG/DL (ref 0.76–1.27)
ERYTHROCYTE [DISTWIDTH] IN BLOOD BY AUTOMATED COUNT: 14.5 % (ref 12.3–15.4)
GLOBULIN SER CALC-MCNC: 3.6 G/DL (ref 1.5–4.5)
GLUCOSE SERPL-MCNC: 86 MG/DL (ref 65–99)
HCT VFR BLD AUTO: 40 % (ref 37.5–51)
HGB BLD-MCNC: 13.6 G/DL (ref 12.6–17.7)
MCH RBC QN AUTO: 30.4 PG (ref 26.6–33)
MCHC RBC AUTO-ENTMCNC: 34 G/DL (ref 31.5–35.7)
MCV RBC AUTO: 89 FL (ref 79–97)
PLATELET # BLD AUTO: 329 X10E3/UL (ref 150–379)
POTASSIUM SERPL-SCNC: 4.3 MMOL/L (ref 3.5–5.2)
PROT SERPL-MCNC: 7.6 G/DL (ref 6–8.5)
RBC # BLD AUTO: 4.48 X10E6/UL (ref 4.14–5.8)
SODIUM SERPL-SCNC: 142 MMOL/L (ref 134–144)
WBC # BLD AUTO: 4.4 X10E3/UL (ref 3.4–10.8)

## 2017-07-07 ENCOUNTER — HOSPITAL ENCOUNTER (OUTPATIENT)
Dept: PHYSICAL THERAPY | Age: 67
Discharge: HOME | End: 2017-07-07
Payer: MEDICAID

## 2017-07-07 PROCEDURE — 97116 GAIT TRAINING THERAPY: CPT

## 2017-07-07 PROCEDURE — 97110 THERAPEUTIC EXERCISES: CPT

## 2017-07-07 NOTE — PROGRESS NOTES
[facility]  [address] [facility]    OUTPATIENT physical Therapy DAILY Treatment NOTe  Visit: 12    NAME: Jesus Lee AGE: 78 y.o. GENDER: male  DATE: 7/7/2017  REFERRING PHYSICIAN: Marta Noguera MD        GOALS  Short term goals  Time frame: 5 weeks  1. Patient will be compliance and independent with the initial HEP as evidenced by being able to perform without cuing. MET  2. Patient tolerate 15 minutes of clinic exercise. MET  3. Report of fewer than 1 fall / week. MET        4. Patient will report decrease in neck pain 4/10 MET    Long term goals  Time frame: 10 weeks  1. Patient will reports pain level decreased to 2/10 to allow increased ease of movement. MET  2. Patient will have an improved score on the LEFS outcome measure by 10 points to demonstrate an increase in functional activity tolerance. MET  3. Patient will be independent in final individualized HEP. 4.Patient will report fewer than 1 fall/ month            SUBJECTIVE:   \"I moved into this new assisted living facility and that is why I am late. \"    No falls reported this week.     OBJECTIVE DATA SUMMARY:   Updated 6/23/17:  Pain:  Location: neck and upper back  Quality: sharp  Now: 0/10  Worst: 10/10  Factors that improve pain: rest    Posture:   Patient with flexed trunk    Strength:   4/5 throughout BLE    Spinal Assessment:   Cervical Spine (AROM)  Flexion  WNL  Extension WNL  R side bend 25% limited  L side bend 25% limited  R rotation 25% limited  L rotation 25% limited    Palpation:   Mild tenderness to b/l UT, rhomboids, levator, cx paraspinals    Neurologic Assessment:   Tone: increased LE tone   Sensation: WFL   Reflexes: not tested    Mobility:   Transitional Movements: increased time to perform    Gait: Pt ambulates with walker, crouched gait and decreased toe clearance noted     Balance:  Poor, improved with r/w    Functional Measure:   LEFS: 33/80 on evaluation   LEFS: 46/80 on 6/23/17     TREATMENT/INTERVENTION:  Modalities (Rationale):  to decrease pain and muscle guarding  MHP to cervical spine for 10 minutes post treatment     Therapeutic Exercises:  HEP provided on evaluation:  Isometric Cx extension, Cx rotation stretch, Shoulder blade squeeze,   Hip abduction ,SLR, Shallow knee bends in walker, Heel/toe raises    Exercises in BOLD performed this date:    Standing:  Steamboats (hip abduction and extension): 15 reps B  Marches: 15 reps B  Mini squats: 15 reps  Toe raise/heel raise  Heel/toe on rocker board, double and SLS (CGA)    Supine:   SLR: 10 reps B  Chin tucks: 10 reps   BKTC on yellow theraball   HS passive x 5 reps  Clamshells x 10 reps 2 sets    Seated:   LAQ x 10 reps with 2#  Marching in place 2#  Hip adductor squeeze with ball: 10 reps with 10 second holds  Hip abduction with green TB: 10 reps  Toe taps on cones: 2 minutes  Cervical rotation and sidebend: 10 reps   Pull-downs at Quantum 20#: 10 reps  Pull backs 20#: 10 reps    Manual Therapy:  Gentle STM to c/s paraspinals and UT     Neuro Re-Education:  Discussed balance challenges to decrease falls    Balance Training:  EC NBOS, perturbations  SLS x 5 sec alternating, 1 hand for balance  EC SLS    SLS on green foam    Ambulation/Gait Training:  Patient ambulated 65 feet with a newly acquired RW. Cued to keep RW within MARCELLA and for erect posture. Noted tendency for center of gravity shifted anterior to feet while leaning forward on RW. Cued for smaller movements to maintain control of RW with turns. Activity tolerance and post treatment pain report: Tolerated well; 0/10 neck pain     Education:  Education was provided to the patient on the following topics: Safe use of r/w. [x]    No changes were made to the home exercise program.  []    The following changes were made to the home exercise program:   Patient verbalized understanding of the topics presented.       ASSESSMENT:   Patient late to session but coming from new halfway. Adjusted a newly repaired RW after he ground the rear legs off on cement. He participated well in activity today with focus on safety awareness and fall prevention utilizing his new device. Patients progression toward goals is as follows:  []     Improving appropriately and progressing toward goals  [x]     Improving slowly and progressing toward goals  []     Not making progress toward goals and plan of care will be adjusted    PLAN OF CARE:   Patient continues to benefit from skilled intervention to address the above impairments. [x]    Continue treatment per established plan of care.   []     Recommend the following changes to the plan of care:     Recommendations/Intent for next treatment: Increase neuromuscular challenges    Marlen Oneal, PT, DPT   Time Calculation: 40 mins  Patient Time in clinic:   Start Time: 5724   Stop Time: 6021

## 2017-07-14 ENCOUNTER — HOSPITAL ENCOUNTER (OUTPATIENT)
Dept: PHYSICAL THERAPY | Age: 67
Discharge: HOME | End: 2017-07-14
Payer: MEDICAID

## 2017-07-18 ENCOUNTER — HOSPITAL ENCOUNTER (OUTPATIENT)
Dept: PHYSICAL THERAPY | Age: 67
Discharge: HOME | End: 2017-07-18
Payer: MEDICAID

## 2017-07-18 PROCEDURE — 97110 THERAPEUTIC EXERCISES: CPT

## 2017-07-18 NOTE — PROGRESS NOTES
[facility]  [address], [address] [facility]    OUTPATIENT physical Therapy DAILY Treatment NOTe  Visit: 13    NAME: Romayne Philips AGE: 78 y.o. GENDER: male  DATE: 7/18/2017  REFERRING PHYSICIAN: Shanika Tripathi MD        GOALS  Short term goals  Time frame: 5 weeks  1. Patient will be compliance and independent with the initial HEP as evidenced by being able to perform without cuing. MET  2. Patient tolerate 15 minutes of clinic exercise. MET  3. Report of fewer than 1 fall / week. MET        4. Patient will report decrease in neck pain 4/10 MET    Long term goals  Time frame: 10 weeks  1. Patient will reports pain level decreased to 2/10 to allow increased ease of movement. MET  2. Patient will have an improved score on the LEFS outcome measure by 10 points to demonstrate an increase in functional activity tolerance. MET  3. Patient will be independent in final individualized HEP. 4.Patient will report fewer than 1 fall/ month            SUBJECTIVE:   \"I moved into this new assisted living facility and that is why I am late. \"    No falls reported this week.     OBJECTIVE DATA SUMMARY:   Updated 6/23/17:  Pain:  Location: neck and upper back  Quality: sharp  Now: 0/10  Worst: 10/10  Factors that improve pain: rest    Posture:   Patient with flexed trunk    Strength:   4/5 throughout BLE    Spinal Assessment:   Cervical Spine (AROM)  Flexion  WNL  Extension WNL  R side bend 25% limited  L side bend 25% limited  R rotation 25% limited  L rotation 25% limited    Palpation:   Mild tenderness to b/l UT, rhomboids, levator, cx paraspinals    Neurologic Assessment:   Tone: increased LE tone   Sensation: WFL   Reflexes: not tested    Mobility:   Transitional Movements: increased time to perform    Gait: Pt ambulates with walker, crouched gait and decreased toe clearance noted     Balance:  Poor, improved with r/w    Functional Measure:   LEFS: 33/80 on evaluation   LEFS: 46/80 on 6/23/17     TREATMENT/INTERVENTION:  Modalities (Rationale):  to decrease pain and muscle guarding  MHP to cervical spine for 10 minutes post treatment     Therapeutic Exercises:  HEP provided on evaluation:  Isometric Cx extension, Cx rotation stretch, Shoulder blade squeeze,   Hip abduction ,SLR, Shallow knee bends in walker, Heel/toe raises    Exercises in BOLD performed this date:    Standing:  Steamboats (hip abduction and extension): 15 reps B  Marches: 15 reps B  Mini squats: 15 reps  Toe raise/heel raise  Heel/toe on rocker board, double and SLS (CGA)    Supine:   SLR: 10 reps B  Chin tucks: 10 reps   BKTC on yellow theraball   HS passive x 5 reps  Clamshells x 10 reps 2 sets    Seated:   LAQ x 10 reps with 2#  Marching in place 2#  Hip adductor squeeze with ball: 10 reps with 10 second holds  Hip abduction with green TB: 10 reps  Toe taps on cones: 2 minutes  Cervical rotation and sidebend: 10 reps   Pull-downs at Quantum 20#: 10 reps  Pull backs 20#: 10 reps    Manual Therapy:  Gentle STM to c/s paraspinals and UT     Neuro Re-Education:  Discussed balance challenges to decrease falls    Balance Training:  EC NBOS, perturbations  SLS x 5 sec alternating, 1 hand for balance  EC SLS    SLS on green foam    Ambulation/Gait Training:  Patient ambulated 65 feet with a newly acquired RW. Cued to keep RW within MARCELLA and for erect posture. Noted tendency for center of gravity shifted anterior to feet while leaning forward on RW. Cued for smaller movements to maintain control of RW with turns. Activity tolerance and post treatment pain report: Tolerated well; 0/10 neck pain     Education:  Education was provided to the patient on the following topics: Safe use of r/w. [x]    No changes were made to the home exercise program.  []    The following changes were made to the home exercise program:   Patient verbalized understanding of the topics presented.       ASSESSMENT:   Patient late to session,  coming from new nursing home. Exchanged rear legs on his walker, ground down by walking on concrete. Consider 4 wheeled regular walker to decrease wear on back legs of walker. He participated well in activity today with focus on safety awareness and fall prevention utilizing his new device.  reports that he is using his rollator at his [facility]. Patients progression toward goals is as follows:  []     Improving appropriately and progressing toward goals  [x]     Improving slowly and progressing toward goals  []     Not making progress toward goals and plan of care will be adjusted    PLAN OF CARE:   Patient continues to benefit from skilled intervention to address the above impairments. [x]    Continue treatment per established plan of care. []     Recommend the following changes to the plan of care:     Recommendations/Intent for next treatment: Increase neuromuscular challenges, try 4 wheeled walker.     Ethan Corbett, PT   Time Calculation: 35 mins  Patient Time in clinic:   Start Time: 1030   Stop Time: [phone]

## 2017-07-27 ENCOUNTER — OFFICE VISIT (OUTPATIENT)
Dept: INTERNAL MEDICINE CLINIC | Age: 67
End: 2017-07-27

## 2017-07-27 VITALS
OXYGEN SATURATION: 98 % | HEIGHT: 66 IN | RESPIRATION RATE: 14 BRPM | TEMPERATURE: 98.2 F | WEIGHT: 131 LBS | HEART RATE: 65 BPM | DIASTOLIC BLOOD PRESSURE: 100 MMHG | SYSTOLIC BLOOD PRESSURE: 160 MMHG | BODY MASS INDEX: 21.05 KG/M2

## 2017-07-27 DIAGNOSIS — F20.9 SCHIZOPHRENIA, CHRONIC CONDITION (HCC): Primary | ICD-10-CM

## 2017-07-27 DIAGNOSIS — B18.2 CHRONIC HEPATITIS C WITHOUT HEPATIC COMA (HCC): ICD-10-CM

## 2017-07-27 DIAGNOSIS — M50.30 DEGENERATIVE DISC DISEASE, CERVICAL: ICD-10-CM

## 2017-07-27 DIAGNOSIS — B20 HIV (HUMAN IMMUNODEFICIENCY VIRUS INFECTION) (HCC): ICD-10-CM

## 2017-07-27 DIAGNOSIS — R26.9 GAIT ABNORMALITY: ICD-10-CM

## 2017-07-27 DIAGNOSIS — I10 ESSENTIAL HYPERTENSION: ICD-10-CM

## 2017-07-27 DIAGNOSIS — Z20.1 EXPOSURE TO TB: ICD-10-CM

## 2017-07-27 RX ORDER — HYDROCORTISONE 25 MG/G
CREAM TOPICAL
Qty: 28.35 G | Refills: 12 | Status: SHIPPED | OUTPATIENT
Start: 2017-07-27 | End: 2018-10-10 | Stop reason: SDUPTHER

## 2017-07-27 NOTE — MR AVS SNAPSHOT
Visit Information Date & Time Provider Department Dept. Phone Encounter #  
 7/27/2017  2:15 PM Nam Alexandre MD [address] [place] [address] [phone] [id number] Follow-up Instructions Return in about 3 months (around 10/27/2017), or if symptoms worsen or fail to improve. Follow-up and Disposition History Your Appointments 9/21/2017 11:00 AM  
ROUTINE CARE with Nam Alexandre MD  
[facility] - [address] [place] [address] ([facility]) Appt Note: 3 month fu  
 [address] [facility] [facility] [phone]  
[phone]  
  
   
 [address] [place] [phone] Upcoming Health Maintenance Date Due  
 GLAUCOMA SCREENING Q2Y 11/22/2014 DTaP/Tdap/Td series (1 - Tdap) 4/26/2018* INFLUENZA AGE 9 TO ADULT 8/1/2017 Pneumococcal 65+ High/Highest Risk (2 of 2 - PPSV23) 10/8/2017 FOBT Q 1 YEAR AGE 50-75 5/16/2018 MEDICARE YEARLY EXAM 5/26/2018 *Topic was postponed. The date shown is not the original due date. Allergies as of 7/27/2017  Review Complete On: 7/27/2017 By: April Fina Mcneil LPN No Known Allergies Current Immunizations  Reviewed on 12/5/2013 Name Date Influenza Vaccine 12/5/2013 Influenza Vaccine Split 10/8/2012 TB Skin Test (PPD) Intradermal 6/21/2017 ZZZ-RETIRED (DO NOT USE) Pneumococcal Vaccine (Unspecified Type) 10/8/2012 Not reviewed this visit You Were Diagnosed With   
  
 Codes Comments Schizophrenia, chronic condition ([facility]    -  Primary ICD-10-CM: F20.9 ICD-9-CM: 295.62 Exposure to TB     ICD-10-CM: Z20.1 ICD-9-CM: V01.1 HIV (human immunodeficiency virus infection) ([facility]     ICD-10-CM: M28 ICD-9-CM: V08 Chronic hepatitis C without hepatic coma (HCC)     ICD-10-CM: B18.2 ICD-9-CM: 070.54 Gait abnormality     ICD-10-CM: R26.9 ICD-9-CM: 781.2 Essential hypertension     ICD-10-CM: I10 
ICD-9-CM: 401.9 Degenerative disc disease, cervical     ICD-10-CM: M50.30 ICD-9-CM: 722.4 Vitals BP Pulse Temp Resp Height(growth percentile) Weight(growth percentile) (!) 160/100 65 98.2 °F (36.8 °C) (Oral) 14 5' 6\" (1.676 m) 131 lb (59.4 kg) SpO2 BMI Smoking Status 98% 21.14 kg/m2 Current Every Day Smoker BMI and BSA Data Body Mass Index Body Surface Area  
 21.14 kg/m 2 1.66 m 2 Preferred Pharmacy Pharmacy Name Phone [id number], [place] [address] Your Updated Medication List  
  
   
This list is accurate as of: 7/27/17  3:34 PM.  Always use your most recent med list.  
  
  
  
  
 cholecalciferol 1,000 unit Cap Commonly known as:  VITAMIN D3 Take  by mouth daily. FLUoxetine 10 mg capsule Commonly known as:  PROzac  
  
 fluPHENAZine decanoate 25 mg/mL injection Commonly known as:  PROLIXIN  
  
 gabapentin 300 mg capsule Commonly known as:  NEURONTIN Take 1 Cap by mouth three (3) times daily. hydroCHLOROthiazide 25 mg tablet Commonly known as:  HYDRODIURIL  
take 1 tablet by mouth once daily  
  
 hydrocortisone 2.5 % rectal cream  
Commonly known as:  PROCTOSOL HC  
insert rectally four times a day as needed  
  
 ibuprofen 800 mg tablet Commonly known as:  MOTRIN  
take 1 tablet by mouth twice a day with food  
  
 isoniazid 300 mg tablet Commonly known as:  NYDRAZID Take 1 Tab by mouth daily. trihexyphenidyl 5 mg tablet Commonly known as:  ARTANE Take 5 mg by mouth three (3) times daily. Deven Kilgore Commonly known as:  2600 bizHive Use as needed Prescriptions Sent to Pharmacy Refills  
 hydrocortisone (PROCTOSOL HC) 2.5 % rectal cream 12 Sig: insert rectally four times a day as needed Class: Normal  
 Pharmacy: [id number] [address] [facility] Ph #: [phone] Follow-up Instructions Return in about 3 months (around 10/27/2017), or if symptoms worsen or fail to improve. To-Do List   
 2017 1:00 PM  
  Appointment with Luc Winters PT at [address] [place] [address] ([phone]) Please remember to arrive at the hospital at least 30 minutes prior to your scheduled appointment time. When you come in for your appointment, please be sure to bring the therapy prescription the doctor gave you, your insurance card, and a list of the medicines you are taking. Also, please remember to wear comfortable, loose- fitting clothes. We look forward to seeing you. Patient Instructions MyChart Activation Thank you for requesting access to [employer]. Please follow the instructions below to securely access and download your online medical record. [employer] allows you to send messages to your doctor, view your test results, renew your prescriptions, schedule appointments, and more. How Do I Sign Up? 1. In your internet browser, go to [email or web address] 
2. Click on the First Time User? Click Here link in the Sign In box. You will be redirect to the New Member Sign Up page. 3. Enter your [employer] Access Code exactly as it appears below. You will not need to use this code after youve completed the sign-up process. If you do not sign up before the expiration date, you must request a new code. MyChart Access Code: Activation code not generated Current [employer] Status: Patient Declined (This is the date your Almashoppingt access code will ) 4. Enter the last four digits of your Social Security Number (xxxx) and Date of Birth (mm/dd/yyyy) as indicated and click Submit. You will be taken to the next sign-up page. 5. Create a [employer] ID. This will be your [employer] login ID and cannot be changed, so think of one that is secure and easy to remember. 6. Create a [employer] password. You can change your password at any time. 7. Enter your Password Reset Question and Answer. This can be used at a later time if you forget your password. 8. Enter your e-mail address. You will receive e-mail notification when new information is available in [address] 9. Click Sign Up. You can now view and download portions of your medical record. 10. Click the Download Summary menu link to download a portable copy of your medical information. Additional Information If you have questions, please visit the Frequently Asked Questions section of the [employer] website at [email or web address] Remember, [employer] is NOT to be used for urgent needs. For medical emergencies, dial 911. Please provide this summary of care documentation to your next provider. Your primary care clinician is listed as Marilou Cotter. If you have any questions after today's visit, please call [phone].

## 2017-07-27 NOTE — PATIENT INSTRUCTIONS
[employer] Sentry Activation    Thank you for requesting access to [employer]. Please follow the instructions below to securely access and download your online medical record. [employer] allows you to send messages to your doctor, view your test results, renew your prescriptions, schedule appointments, and more. How Do I Sign Up? 1. In your internet browser, go to [email or web address]  2. Click on the First Time User? Click Here link in the Sign In box. You will be redirect to the New Member Sign Up page. 3. Enter your [employer] Access Code exactly as it appears below. You will not need to use this code after youve completed the sign-up process. If you do not sign up before the expiration date, you must request a new code. [employer] Access Code: Activation code not generated  Current [employer] Status: Patient Declined (This is the date your [employer] access code will )    4. Enter the last four digits of your Social Security Number (xxxx) and Date of Birth (mm/dd/yyyy) as indicated and click Submit. You will be taken to the next sign-up page. 5. Create a [employer] ID. This will be your [employer] login ID and cannot be changed, so think of one that is secure and easy to remember. 6. Create a [employer] password. You can change your password at any time. 7. Enter your Password Reset Question and Answer. This can be used at a later time if you forget your password. 8. Enter your e-mail address. You will receive e-mail notification when new information is available in [address] 9. Click Sign Up. You can now view and download portions of your medical record. 10. Click the Download Summary menu link to download a portable copy of your medical information. Additional Information    If you have questions, please visit the Frequently Asked Questions section of the [employer] website at [email or web address] Remember, [employer] is NOT to be used for urgent needs. For medical emergencies, dial 911.

## 2017-07-27 NOTE — PROGRESS NOTES
Kenneth Rojas is a 78 y.o. male and presents with Other (positive ppd reading)  . Subjective:  Hypertension Review:  The patient has hypertension  Diet and Lifestyle: generally follows a  low sodium diet, exercises sporadically  Home BP Monitoring: is not measured at home. Pertinent ROS: taking medications as instructed, no medication side effects noted, no TIA's, no chest pain on exertion, no dyspnea on exertion, no swelling of ankles. He recently had cervical disc surgery and is undergoing physical therapy presently    He has a history of hiv.he is under treatment    He has a history of hemorrhoids. Review of Systems  Constitutional: negative for fevers, chills, anorexia and weight loss  Eyes:   negative for visual disturbance and irritation  ENT:   negative for tinnitus,sore throat,nasal congestion,ear pains. hoarseness  Respiratory:  negative for cough, hemoptysis, dyspnea,wheezing  CV:   negative for chest pain, palpitations, lower extremity edema  GI:   negative for nausea, vomiting, diarrhea, abdominal pain,melena  Endo:               negative for polyuria,polydipsia,polyphagia,heat intolerance  Genitourinary: negative for frequency, dysuria and hematuria  Integument:  negative for rash and pruritus  Hematologic:  negative for easy bruising and gum/nose bleeding  Musculoskel: negative for myalgias, arthralgias, back pain, muscle weakness, joint pain  Neurological:  negative for headaches, dizziness, vertigo, memory problems and gait   Behavl/Psych: negative for feelings of anxiety, depression, mood changes    Past Medical History:   Diagnosis Date    Chronic hepatitis C without mention of hepatic coma 4/7/2011    Degenerative Joint Disease 7/13/2011    Hematochezia 4/7/2011    HIV (human immunodeficiency virus infection) ([facility] 4/7/2011    Hypetension 4/7/2011    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 arm surgery due to abscess    HX OTHER SURGICAL      flank mole removal     Social History     Social History    Marital status: SINGLE     Spouse name: N/A    Number of children: N/A    Years of education: N/A     Social History Main Topics    Smoking status: Current Every Day Smoker     Packs/day: 0.25    Smokeless tobacco: Never Used    Alcohol use No    Drug use: No    Sexual activity: Not Asked     Other Topics Concern    None     Social History Narrative     Family History   Problem Relation Age of Onset    Kidney Disease Mother     Diabetes Mother     No Known Problems Father     Heart Disease Sister     Kidney Disease Sister     Diabetes Brother     Heart Disease Brother     Cancer Brother     Alcohol abuse Brother     Obesity Brother     Other Brother      Drugs     Current Outpatient Prescriptions   Medication Sig Dispense Refill    hydrocortisone (PROCTOSOL HC) 2.5 % rectal cream insert rectally four times a day as needed 28.35 g 12    isoniazid (NYDRAZID) 300 mg tablet Take 1 Tab by mouth daily. 30 Tab 9    ibuprofen (MOTRIN) 800 mg tablet take 1 tablet by mouth twice a day with food 60 Tab 12    hydroCHLOROthiazide (HYDRODIURIL) 25 mg tablet take 1 tablet by mouth once daily 30 Tab 12    gabapentin (NEURONTIN) 300 mg capsule Take 1 Cap by mouth three (3) times daily. 90 Cap 12    fluPHENAZine decanoate (PROLIXIN) 25 mg/mL injection       Walker (MAYURI ROLLING WALKER) misc Use as needed 1 Each 0    cholecalciferol (VITAMIN D3) 1,000 unit cap Take  by mouth daily.  FLUoxetine (PROZAC) 10 mg capsule   0    trihexyphenidyl (ARTANE) 5 mg tablet Take 5 mg by mouth three (3) times daily.        No Known Allergies    Objective:  Visit Vitals    BP (!) 160/100    Pulse 65    Temp 98.2 °F (36.8 °C) (Oral)    Resp 14    Ht 5' 6\" (1.676 m)    Wt 131 lb (59.4 kg)    SpO2 98%    BMI 21.14 kg/m2     Physical Exam:   General appearance - alert, well appearing, and in no distress  Mental status - alert, oriented to person, place, and time  EYE-SHANNAN, EOMI, corneas normal, no foreign bodies  ENT-ENT exam normal, no neck nodes or sinus tenderness  Nose - normal and patent, no erythema, discharge or polyps  Mouth - mucous membranes moist, pharynx normal without lesions  Neck - supple, no significant adenopathy   Chest - clear to auscultation, no wheezes, rales or rhonchi, symmetric air entry   Heart - normal rate, regular rhythm, normal S1, S2, no murmurs, rubs, clicks or gallops   Abdomen - soft, nontender, nondistended, no masses or organomegaly  Lymph- no adenopathy palpable  Ext-peripheral pulses normal, no pedal edema, no clubbing or cyanosis  Skin-Warm and dry. no hyperpigmentation, vitiligo, or suspicious lesions  Neuro -alert, oriented, normal speech, no focal findings or movement disorder noted  Neck-normal C-spine, no tenderness, full ROM without pain  Feet-no nail deformities or callus formation with good pulses noted      Results for orders placed or performed in visit on 06/21/17   AMB POC TUBERCULOSIS, INTRADERMAL (SKIN TEST)   Result Value Ref Range    PPD positive Negative    mm Induration 25 (A) 15 mm       Assessment/Plan:    ICD-10-CM ICD-9-CM    1. Schizophrenia, chronic condition (HCC) F20.9 295.62    2. Exposure to TB Z20.1 V01.1    3. HIV (human immunodeficiency virus infection) ([facility] Z21 V08    4. Chronic hepatitis C without hepatic coma (HCC) B18.2 070.54    5. Gait abnormality R26.9 781.2    6. Essential hypertension I10 401.9    7.  Degenerative disc disease, cervical M50.30 722.4      Orders Placed This Encounter    hydrocortisone (PROCTOSOL HC) 2.5 % rectal cream     Sig: insert rectally four times a day as needed     Dispense:  28.35 g     Refill:  12     lose weight, follow low fat diet, follow low salt diet,Take 81mg aspirin daily  Patient Instructions   MyChart Activation    Thank you for requesting access to [employer]. Please follow the instructions below to securely access and download your online medical record. [employer] allows you to send messages to your doctor, view your test results, renew your prescriptions, schedule appointments, and more. How Do I Sign Up? 1. In your internet browser, go to [email or web address]  2. Click on the First Time User? Click Here link in the Sign In box. You will be redirect to the New Member Sign Up page. 3. Enter your [employer] Access Code exactly as it appears below. You will not need to use this code after youve completed the sign-up process. If you do not sign up before the expiration date, you must request a new code. [employer] Access Code: Activation code not generated  Current [employer] Status: Patient Declined (This is the date your [employer] access code will )    4. Enter the last four digits of your Social Security Number (xxxx) and Date of Birth (mm/dd/yyyy) as indicated and click Submit. You will be taken to the next sign-up page. 5. Create a Kintera ID. This will be your K[employer]a login ID and cannot be changed, so think of one that is secure and easy to remember. 6. Create a [employer] password. You can change your password at any time. 7. Enter your Password Reset Question and Answer. This can be used at a later time if you forget your password. 8. Enter your e-mail address. You will receive e-mail notification when new information is available in [address] 9. Click Sign Up. You can now view and download portions of your medical record. 10. Click the Download Summary menu link to download a portable copy of your medical information. Additional Information    If you have questions, please visit the Frequently Asked Questions section of the [employer] website at [email or web address] Remember, [employer] is NOT to be used for urgent needs. For medical emergencies, dial 911. Follow-up Disposition:  Return in about 3 months (around 10/27/2017), or if symptoms worsen or fail to improve. I have reviewed with the patient details of the assessment and plan and all questions were answered. Relevent patient education was performed. The most recent lab findings were reviewed with the patient. An After Visit Summary was printed and given to the patient.

## 2017-07-28 ENCOUNTER — HOSPITAL ENCOUNTER (OUTPATIENT)
Dept: PHYSICAL THERAPY | Age: 67
Discharge: HOME | End: 2017-07-28
Payer: MEDICAID

## 2017-07-28 PROCEDURE — 97110 THERAPEUTIC EXERCISES: CPT | Performed by: PHYSICAL THERAPIST

## 2017-07-28 NOTE — PROGRESS NOTES
[facility] (formerly [facility])  [address], [address] [facility]    OUTPATIENT physical Therapy DAILY Treatment NOTe with discharge  Visit: 14    NAME: Autumn Renae AGE: 78 y.o. GENDER: male  DATE: 7/28/2017  REFERRING PHYSICIAN: Doreen Kohler MD        GOALS  Short term goals  Time frame: 5 weeks  1. Patient will be compliance and independent with the initial HEP as evidenced by being able to perform without cuing. MET  2. Patient tolerate 15 minutes of clinic exercise. MET  3. Report of fewer than 1 fall / week. MET        4. Patient will report decrease in neck pain 4/10 MET    Long term goals  Time frame: 10 weeks  1. Patient will reports pain level decreased to 2/10 to allow increased ease of movement. MET  2. Patient will have an improved score on the LEFS outcome measure by 10 points to demonstrate an increase in functional activity tolerance. MET  3. Patient will be independent in final individualized HEP. MET  4. Patient will report fewer than 1 fall/ month. MET            SUBJECTIVE:   \"I don't have any pain and I haven't had any falls. I think I'm doing better. \"    No falls reported.    Pain: 0/10 in neck    OBJECTIVE DATA SUMMARY:     Pain:  Location: neck and upper back  Quality: sharp  Now: 0/10  Factors that improve pain: rest    Posture:   Patient with flexed trunk    Strength:   4/5 throughout BLE    Spinal Assessment:   Cervical Spine (AROM)  Flexion  WNL  Extension WNL  R side bend 25% limited  L side bend 25% limited  R rotation 25% limited  L rotation 25% limited    Palpation:   Mild tenderness to b/l UT, rhomboids, levator, cx paraspinals    Neurologic Assessment:   Tone: increased LE tone   Sensation: WFL   Reflexes: not tested    Mobility:   Transitional Movements: increased time to perform    Gait: Pt ambulates with walker, crouched gait and decreased toe clearance noted     Balance:  Poor, improved with r/w    Functional Measure:   LEFS: 33/80 on evaluation   LEFS: 46/80 on 6/23/17  LEFS: 47/80 on discharge 7/28/17     TREATMENT/INTERVENTION:  Modalities (Rationale):  to decrease pain and muscle guarding  MHP to cervical spine for 10 minutes post treatment -held this date    Therapeutic Exercises:  HEP provided on evaluation:  Isometric Cx extension, Cx rotation stretch, Shoulder blade squeeze,   Hip abduction ,SLR, Shallow knee bends in walker, Heel/toe raises    Patient provided with comprehensive HEP 7/28/17: Ro Quails, SLR, Hip add with ball, Hip abd with band,   Sidelying hip abduction, Standing hip flexion/abduction/extension, mini squats    Exercises in BOLD performed this date:    Standing:  Steamboats (hip abduction and extension): 15 reps B  Marches: 2 sets of 15 reps   Mini squats: 15 reps  Toe raise/heel raise    Supine:   SLR: 15 reps B  Chin tucks: 10 reps  Bridges: 15 reps  Sidelying hip abduction: 10 reps B  HS passive x 5 reps  Clamshells x 10 reps 2 sets    Seated:   LAQ x 10 reps with 2#  Marching in place 2#  Hip adductor squeeze with ball: 10 reps with 10 second holds  Hip abduction with green TB: 10 reps  Cervical rotation and sidebend: 10 reps     Neuro Re-Education:  Discussed balance challenges to decrease falls    Balance Training:  EC NBOS, perturbations  SLS x 5 sec alternating, 1 hand for balance  SLS on green foam    Ambulation/Gait Training:  Patient ambulated 65 feet with a newly acquired RW. Cued to keep RW within MARCELLA and for erect posture. Noted tendency for center of gravity shifted anterior to feet while leaning forward on RW. Cued for smaller movements to maintain control of RW with turns. Activity tolerance and post treatment pain report:    Tolerated well; 0/10 neck pain     Education:  Education was provided to the patient on the following topics: Safe use of r/w.  []    No changes were made to the home exercise program.  [x]    The following changes were made to the home exercise program: Patient provided with comprehensive HEP Patient verbalized understanding of the topics presented. ASSESSMENT:   Patient discharged from physical therapy this date secondary to no neck pain and plateau reached for gait and balance. Patient reports no falls in one month. Patient ambulates with either his rolling walker or 4 wheeled rollator. Patient participated well in activity today with focus on safety awareness and fall prevention while using rolling walker.  and patient verbalized understanding about discharge. Encouraged rec program at [employer] location in order to continue fitness program. Patient provided with comprehensive HEP this date. PLAN OF CARE:   Patient will be discharged from physical therapy at this time.   Criteria for termination of care:  [x]   Goals Achieved: Neck  [x]   Plateau Reached: Gait and balance  []   Patient has not returned  []   Other:   Plan for follow up, continuing care, or equipment recommendations: Patient provided with comprehensive HEP and encouraged to join recreational fitness program   Thank you for this referral.    Lois Lawrence, PT   Time Calculation: 30 mins  Patient Time in clinic:   Start Time: 4683   Stop Time: [phone]

## 2017-09-21 ENCOUNTER — OFFICE VISIT (OUTPATIENT)
Dept: INTERNAL MEDICINE CLINIC | Age: 67
End: 2017-09-21

## 2017-09-21 VITALS
SYSTOLIC BLOOD PRESSURE: 126 MMHG | HEIGHT: 66 IN | WEIGHT: 130 LBS | BODY MASS INDEX: 20.89 KG/M2 | DIASTOLIC BLOOD PRESSURE: 70 MMHG | RESPIRATION RATE: 20 BRPM | HEART RATE: 82 BPM | OXYGEN SATURATION: 98 % | TEMPERATURE: 98.4 F

## 2017-09-21 DIAGNOSIS — M48.02 STENOSIS OF CERVICAL SPINE WITH MYELOPATHY (HCC): ICD-10-CM

## 2017-09-21 DIAGNOSIS — F20.9 SCHIZOPHRENIA, CHRONIC CONDITION (HCC): ICD-10-CM

## 2017-09-21 DIAGNOSIS — E55.9 VITAMIN D DEFICIENCY: ICD-10-CM

## 2017-09-21 DIAGNOSIS — I10 ESSENTIAL HYPERTENSION: Primary | ICD-10-CM

## 2017-09-21 DIAGNOSIS — G99.2 STENOSIS OF CERVICAL SPINE WITH MYELOPATHY (HCC): ICD-10-CM

## 2017-09-21 DIAGNOSIS — B20 HIV (HUMAN IMMUNODEFICIENCY VIRUS INFECTION) (HCC): ICD-10-CM

## 2017-09-21 DIAGNOSIS — R26.9 GAIT ABNORMALITY: ICD-10-CM

## 2017-09-21 DIAGNOSIS — I63.50 RIGHT PONTINE STROKE (HCC): ICD-10-CM

## 2017-09-21 LAB
CHOLEST SERPL-MCNC: 167 MG/DL
HDLC SERPL-MCNC: 72 MG/DL
LDL CHOLESTEROL POC: 72 MG/DL
NON-HDL GOAL (POC): 95
TCHOL/HDL RATIO (POC): 2.3
TRIGL SERPL-MCNC: 114 MG/DL

## 2017-09-21 RX ORDER — ERGOCALCIFEROL 1.25 MG/1
50000 CAPSULE ORAL
Qty: 12 CAP | Refills: 3 | Status: ON HOLD | OUTPATIENT
Start: 2017-09-21 | End: 2020-12-27

## 2017-09-21 NOTE — MR AVS SNAPSHOT
Visit Information Date & Time Provider Department Dept. Phone Encounter #  
 9/21/2017 11:00 AM Valentino Dearth., MD [address] [phone] [id number] Follow-up Instructions Return in about 3 months (around 12/21/2017), or if symptoms worsen or fail to improve. Upcoming Health Maintenance Date Due  
 GLAUCOMA SCREENING Q2Y 11/22/2014 DTaP/Tdap/Td series (1 - Tdap) 4/26/2018* Pneumococcal 65+ High/Highest Risk (2 of 2 - PPSV23) 10/8/2017 FOBT Q 1 YEAR AGE 50-75 5/16/2018 MEDICARE YEARLY EXAM 5/26/2018 *Topic was postponed. The date shown is not the original due date. Allergies as of 9/21/2017  Review Complete On: 9/21/2017 By: Valentino Dearth., MD  
 No Known Allergies Current Immunizations  Reviewed on 12/5/2013 Name Date Influenza Vaccine 12/5/2013 Influenza Vaccine Split 10/8/2012 TB Skin Test (PPD) Intradermal 6/21/2017 ZZZ-RETIRED (DO NOT USE) Pneumococcal Vaccine (Unspecified Type) 10/8/2012 Not reviewed this visit You Were Diagnosed With   
  
 Codes Comments Essential hypertension    -  Primary ICD-10-CM: I10 
ICD-9-CM: 401.9 Stenosis of cervical spine with myelopathy     ICD-10-CM: M47.12 
ICD-9-CM: 721.1 HIV (human immunodeficiency virus infection) ([facility]     ICD-10-CM: R26 ICD-9-CM: V08 Schizophrenia, chronic condition ([facility]     ICD-10-CM: F20.9 ICD-9-CM: 295.62 Gait abnormality     ICD-10-CM: R26.9 ICD-9-CM: 666. 2 Vitamin D deficiency     ICD-10-CM: E55.9 ICD-9-CM: 268.9 Right pontine stroke [facility])     ICD-10-CM: I63.50 ICD-9-CM: 434.91 Vitals BP Pulse Temp Resp Height(growth percentile) Weight(growth percentile) 126/70 (BP 1 Location: Right arm, BP Patient Position: Sitting) 82 98.4 °F (36.9 °C) (Oral) 20 5' 6\" (1.676 m) 130 lb (59 kg) SpO2 BMI Smoking Status 98% 20.98 kg/m2 Current Every Day Smoker BMI and BSA Data Body Mass Index Body Surface Area  
 20.98 kg/m 2 1.66 m 2 Preferred Pharmacy Pharmacy Name Phone [facility]/PHARMACY #[id number]- [place], [place] [address] Your Updated Medication List  
  
   
This list is accurate as of: 9/21/17 12:15 PM.  Always use your most recent med list.  
  
  
  
  
 cholecalciferol 1,000 unit Cap Commonly known as:  VITAMIN D3 Take  by mouth daily. ergocalciferol 50,000 unit capsule Commonly known as:  ERGOCALCIFEROL Take 1 Cap by mouth every seven (7) days. FLUoxetine 10 mg capsule Commonly known as:  PROzac  
  
 fluPHENAZine decanoate 25 mg/mL injection Commonly known as:  PROLIXIN  
  
 gabapentin 300 mg capsule Commonly known as:  NEURONTIN Take 1 Cap by mouth three (3) times daily. hydroCHLOROthiazide 25 mg tablet Commonly known as:  HYDRODIURIL  
take 1 tablet by mouth once daily  
  
 hydrocortisone 2.5 % rectal cream  
Commonly known as:  PROCTOSOL HC  
insert rectally four times a day as needed  
  
 ibuprofen 800 mg tablet Commonly known as:  MOTRIN  
take 1 tablet by mouth twice a day with food  
  
 isoniazid 300 mg tablet Commonly known as:  NYDRAZID Take 1 Tab by mouth daily. trihexyphenidyl 5 mg tablet Commonly known as:  ARTANE Take 5 mg by mouth three (3) times daily. Cleavon Glory Commonly known as:  [address] [facility] Use as needed Prescriptions Sent to Pharmacy Refills  
 ergocalciferol (ERGOCALCIFEROL) 50,000 unit capsule 3 Sig: Take 1 Cap by mouth every seven (7) days. Class: Normal  
 Pharmacy: [facility]/pharmacy #[id number]- [place], [address] [place] [address] #: [phone] Route: Oral  
  
We Performed the Following AMB POC LIPID PROFILE [98349 CPT(R)] Follow-up Instructions Return in about 3 months (around 12/21/2017), or if symptoms worsen or fail to improve. Patient Instructions MyChart Activation Thank you for requesting access to [employer]. Please follow the instructions below to securely access and download your online medical record. [employer] allows you to send messages to your doctor, view your test results, renew your prescriptions, schedule appointments, and more. How Do I Sign Up? 1. In your internet browser, go to [email or web address] 
2. Click on the First Time User? Click Here link in the Sign In box. You will be redirect to the New Member Sign Up page. 3. Enter your [employer] Access Code exactly as it appears below. You will not need to use this code after youve completed the sign-up process. If you do not sign up before the expiration date, you must request a new code. [email or web address] Access Code: Activation code not generated Current [employer] Status: Patient Declined (This is the date your [employer] access code will ) 4. Enter the last four digits of your Social Security Number (xxxx) and Date of Birth (mm/dd/yyyy) as indicated and click Submit. You will be taken to the next sign-up page. 5. Create a [employer] ID. This will be your [employer] login ID and cannot be changed, so think of one that is secure and easy to remember. 6. Create a [employer] password. You can change your password at any time. 7. Enter your Password Reset Question and Answer. This can be used at a later time if you forget your password. 8. Enter your e-mail address. You will receive e-mail notification when new information is available in [address] 9. Click Sign Up. You can now view and download portions of your medical record. 10. Click the Download Summary menu link to download a portable copy of your medical information. Additional Information If you have questions, please visit the Frequently Asked Questions section of the [employer] website at [email or web address] Remember, [employer] is NOT to be used for urgent needs. For medical emergencies, dial 911. Please provide this summary of care documentation to your next provider. Your primary care clinician is listed as Worthy Anthony. If you have any questions after today's visit, please call [phone].

## 2017-09-21 NOTE — PROGRESS NOTES
Yolie Jules is a 78 y.o. male and presents with PPD Reading (follow up xray and medication); Hypertension; and HIV  . Subjective:  Hypertension Review:  The patient has hypertension  Diet and Lifestyle: generally follows a  low sodium diet, exercises sporadically  Home BP Monitoring: is not measured at home. Pertinent ROS: taking medications as instructed, no medication side effects noted, no TIA's, no chest pain on exertion, no dyspnea on exertion, no swelling of ankles. He recently had cervical disc surgery and has completed physical therapy,he has recurrent pains reported    He has a history of hiv.he is under treatment    He has a history of hemorrhoids. He has a rollator walker        Review of Systems  Constitutional: negative for fevers, chills, anorexia and weight loss  Eyes:   negative for visual disturbance and irritation  ENT:   negative for tinnitus,sore throat,nasal congestion,ear pains. hoarseness  Respiratory:  negative for cough, hemoptysis, dyspnea,wheezing  CV:   negative for chest pain, palpitations, lower extremity edema  GI:   negative for nausea, vomiting, diarrhea, abdominal pain,melena  Endo:               negative for polyuria,polydipsia,polyphagia,heat intolerance  Genitourinary: negative for frequency, dysuria and hematuria  Integument:  negative for rash and pruritus  Hematologic:  negative for easy bruising and gum/nose bleeding  Musculoskel: negative for myalgias, arthralgias, back pain, muscle weakness, joint pain  Neurological:  negative for headaches, dizziness, vertigo, memory problems and gait   Behavl/Psych: negative for feelings of anxiety, depression, mood changes    Past Medical History:   Diagnosis Date    Chronic hepatitis C without mention of hepatic coma 4/7/2011    Degenerative Joint Disease 7/13/2011    Hematochezia 4/7/2011    HIV (human immunodeficiency virus infection) ([facility] 4/7/2011    Hypetension 4/7/2011    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 arm surgery due to abscess    HX OTHER SURGICAL      flank mole removal     Social History     Social History    Marital status: SINGLE     Spouse name: N/A    Number of children: N/A    Years of education: N/A     Social History Main Topics    Smoking status: Current Every Day Smoker     Packs/day: 0.25    Smokeless tobacco: Never Used    Alcohol use No    Drug use: No    Sexual activity: Not Asked     Other Topics Concern    None     Social History Narrative     Family History   Problem Relation Age of Onset    Kidney Disease Mother     Diabetes Mother     No Known Problems Father     Heart Disease Sister     Kidney Disease Sister     Diabetes Brother     Heart Disease Brother     Cancer Brother     Alcohol abuse Brother     Obesity Brother     Other Brother      Drugs     Current Outpatient Prescriptions   Medication Sig Dispense Refill    hydrocortisone (PROCTOSOL HC) 2.5 % rectal cream insert rectally four times a day as needed 28.35 g 12    isoniazid (NYDRAZID) 300 mg tablet Take 1 Tab by mouth daily. 30 Tab 9    ibuprofen (MOTRIN) 800 mg tablet take 1 tablet by mouth twice a day with food 60 Tab 12    hydroCHLOROthiazide (HYDRODIURIL) 25 mg tablet take 1 tablet by mouth once daily 30 Tab 12    gabapentin (NEURONTIN) 300 mg capsule Take 1 Cap by mouth three (3) times daily. 90 Cap 12    fluPHENAZine decanoate (PROLIXIN) 25 mg/mL injection       Walker (MAYURI ROLLING WALKER) misc Use as needed 1 Each 0    cholecalciferol (VITAMIN D3) 1,000 unit cap Take  by mouth daily.  FLUoxetine (PROZAC) 10 mg capsule   0    trihexyphenidyl (ARTANE) 5 mg tablet Take 5 mg by mouth three (3) times daily.        No Known Allergies    Objective:  Visit Vitals    /70 (BP 1 Location: Right arm, BP Patient Position: Sitting)    Pulse 82    Temp 98.4 °F (36.9 °C) (Oral)    Resp 20    Ht 5' 6\" (1.676 m)    Wt 130 lb (59 kg)    SpO2 98%    BMI 20.98 kg/m2     Physical Exam:   General appearance - alert, well appearing, and in no distress  Mental status - alert, oriented to person, place, and time  EYE-SHANNAN, EOMI, corneas normal, no foreign bodies  ENT-ENT exam normal, no neck nodes or sinus tenderness  Nose - normal and patent, no erythema, discharge or polyps  Mouth - mucous membranes moist, pharynx normal without lesions  Neck - supple, no significant adenopathy   Chest - clear to auscultation, no wheezes, rales or rhonchi, symmetric air entry   Heart - normal rate, regular rhythm, normal S1, S2, no murmurs, rubs, clicks or gallops   Abdomen - soft, nontender, nondistended, no masses or organomegaly  Lymph- no adenopathy palpable  Ext-peripheral pulses normal, no pedal edema, no clubbing or cyanosis  Skin-Warm and dry. no hyperpigmentation, vitiligo, or suspicious lesions  Neuro -alert, oriented, normal speech, no focal findings or movement disorder noted  Neck-normal C-spine, no tenderness, full ROM without pain  Feet-no nail deformities or callus formation with good pulses noted      Results for orders placed or performed in visit on 06/21/17   AMB POC TUBERCULOSIS, INTRADERMAL (SKIN TEST)   Result Value Ref Range    PPD positive Negative    mm Induration 25 (A) 15 mm       Assessment/Plan:    ICD-10-CM ICD-9-CM    1. Essential hypertension I10 401.9 AMB POC LIPID PROFILE   2. Stenosis of cervical spine with myelopathy M47.12 721.1    3. HIV (human immunodeficiency virus infection) ([facility] Z21 V08    4. Schizophrenia, chronic condition ([facility] F20.9 295.62    5. Gait abnormality R26.9 781.2    6. Vitamin D deficiency E55.9 268.9    7.  Right pontine stroke ([facility] I63.50 434.91      Orders Placed This Encounter    AMB POC LIPID PROFILE     lose weight, follow low fat diet, follow low salt diet,Take 81mg aspirin daily  Patient Instructions   MyChart Activation    Thank you for requesting access to [employer]. Please follow the instructions below to securely access and download your online medical record. [employer] allows you to send messages to your doctor, view your test results, renew your prescriptions, schedule appointments, and more. How Do I Sign Up? 1. In your internet browser, go to [email or web address]  2. Click on the First Time User? Click Here link in the Sign In box. You will be redirect to the New Member Sign Up page. 3. Enter your [employer] Access Code exactly as it appears below. You will not need to use this code after youve completed the sign-up process. If you do not sign up before the expiration date, you must request a new code. [employer] Access Code: Activation code not generated  Current [employer] Status: Patient Declined (This is the date your [employer] access code will )    4. Enter the last four digits of your Social Security Number (xxxx) and Date of Birth (mm/dd/yyyy) as indicated and click Submit. You will be taken to the next sign-up page. 5. Create a [employer] ID. This will be your [employer] login ID and cannot be changed, so think of one that is secure and easy to remember. 6. Create a [employer] password. You can change your password at any time. 7. Enter your Password Reset Question and Answer. This can be used at a later time if you forget your password. 8. Enter your e-mail address. You will receive e-mail notification when new information is available in [address] 9. Click Sign Up. You can now view and download portions of your medical record. 10. Click the Download Summary menu link to download a portable copy of your medical information.     Additional Information    If you have questions, please visit the Frequently Asked Questions section of the [employer] website at [email or web address] Remember, [employer] is NOT to be used for urgent needs. For medical emergencies, dial 911. Follow-up Disposition:  Return in about 3 months (around 12/21/2017), or if symptoms worsen or fail to improve. I have reviewed with the patient details of the assessment and plan and all questions were answered. Relevent patient education was performed. The most recent lab findings were reviewed with the patient. An After Visit Summary was printed and given to the patient.

## 2017-09-21 NOTE — PATIENT INSTRUCTIONS
[email or web address] Activation    Thank you for requesting access to [employer]. Please follow the instructions below to securely access and download your online medical record. [employer] allows you to send messages to your doctor, view your test results, renew your prescriptions, schedule appointments, and more. How Do I Sign Up? 1. In your internet browser, go to [email or web address]  2. Click on the First Time User? Click Here link in the Sign In box. You will be redirect to the New Member Sign Up page. 3. Enter your [employer] Access Code exactly as it appears below. You will not need to use this code after youve completed the sign-up process. If you do not sign up before the expiration date, you must request a new code. [employer] Access Code: Activation code not generated  Current [employer] Status: Patient Declined (This is the date your [employer] access code will )    4. Enter the last four digits of your Social Security Number (xxxx) and Date of Birth (mm/dd/yyyy) as indicated and click Submit. You will be taken to the next sign-up page. 5. Create a [employer] ID. This will be your [employer] login ID and cannot be changed, so think of one that is secure and easy to remember. 6. Create a [employer] password. You can change your password at any time. 7. Enter your Password Reset Question and Answer. This can be used at a later time if you forget your password. 8. Enter your e-mail address. You will receive e-mail notification when new information is available in [address] 9. Click Sign Up. You can now view and download portions of your medical record. 10. Click the Download Summary menu link to download a portable copy of your medical information. Additional Information    If you have questions, please visit the Frequently Asked Questions section of the [employer] website at [email or web address] Remember, [employer] is NOT to be used for urgent needs. For medical emergencies, dial 911.

## 2017-12-21 ENCOUNTER — OFFICE VISIT (OUTPATIENT)
Dept: INTERNAL MEDICINE CLINIC | Age: 67
End: 2017-12-21

## 2017-12-21 VITALS
TEMPERATURE: 98 F | BODY MASS INDEX: 22.5 KG/M2 | RESPIRATION RATE: 20 BRPM | WEIGHT: 140 LBS | OXYGEN SATURATION: 98 % | HEART RATE: 88 BPM | HEIGHT: 66 IN | SYSTOLIC BLOOD PRESSURE: 118 MMHG | DIASTOLIC BLOOD PRESSURE: 70 MMHG

## 2017-12-21 DIAGNOSIS — B20 HIV (HUMAN IMMUNODEFICIENCY VIRUS INFECTION) (HCC): ICD-10-CM

## 2017-12-21 DIAGNOSIS — F20.9 SCHIZOPHRENIA, CHRONIC CONDITION (HCC): ICD-10-CM

## 2017-12-21 DIAGNOSIS — I10 ESSENTIAL HYPERTENSION: Primary | ICD-10-CM

## 2017-12-21 DIAGNOSIS — L02.32 BOIL OF BUTTOCK: ICD-10-CM

## 2017-12-21 RX ORDER — CEPHALEXIN 500 MG/1
500 CAPSULE ORAL 4 TIMES DAILY
Qty: 28 CAP | Refills: 1 | Status: SHIPPED | OUTPATIENT
Start: 2017-12-21 | End: 2018-01-24 | Stop reason: ALTCHOICE

## 2017-12-21 NOTE — MR AVS SNAPSHOT
Visit Information Date & Time Provider Department Dept. Phone Encounter #  
 12/21/2017 11:45 AM Milo Kocher., MD [facility] [phone] [id number] Follow-up Instructions Return in about 3 months (around 3/21/2018), or if symptoms worsen or fail to improve. Upcoming Health Maintenance Date Due  
 GLAUCOMA SCREENING Q2Y 11/22/2014 Pneumococcal 65+ High/Highest Risk (2 of 2 - PPSV23) 10/8/2017 DTaP/Tdap/Td series (1 - Tdap) 4/26/2018* FOBT Q 1 YEAR AGE 50-75 5/16/2018 MEDICARE YEARLY EXAM 5/26/2018 *Topic was postponed. The date shown is not the original due date. Allergies as of 12/21/2017  Review Complete On: 12/21/2017 By: Milo Kocher., MD  
 No Known Allergies Current Immunizations  Reviewed on 12/5/2013 Name Date Influenza Vaccine 12/5/2013 Influenza Vaccine Split 10/8/2012 TB Skin Test (PPD) Intradermal 6/21/2017 ZZZ-RETIRED (DO NOT USE) Pneumococcal Vaccine (Unspecified Type) 10/8/2012 Not reviewed this visit You Were Diagnosed With   
  
 Codes Comments Essential hypertension    -  Primary ICD-10-CM: I10 
ICD-9-CM: 401.9 HIV (human immunodeficiency virus infection) ([facility]     ICD-10-CM: B20 
ICD-9-CM: V08 [facility]     ICD-10-CM: F20.9 ICD-9-CM: 295.62 Boil of buttock     ICD-10-CM: L02.32 
ICD-9-CM: 680.5 Vitals BP Pulse Temp Resp Height(growth percentile) Weight(growth percentile) 118/70 (BP 1 Location: Right arm, BP Patient Position: Sitting) 88 98 °F (36.7 °C) (Oral) 20 5' 6\" (1.676 m) 140 lb (63.5 kg) SpO2 BMI Smoking Status 98% 22.6 kg/m2 Current Every Day Smoker BMI and BSA Data Body Mass Index Body Surface Area  
 22.6 kg/m 2 1.72 m 2 Preferred Pharmacy Pharmacy Name Phone [address] [place] [address] [phone] Your Updated Medication List  
  
   
This list is accurate as of: 12/21/17  1:04 PM.  Always use your most recent med list.  
  
  
  
  
 cephALEXin 500 mg capsule Commonly known as:  Waqas Ewa Take 1 Cap by mouth four (4) times daily. cholecalciferol 1,000 unit Cap Commonly known as:  VITAMIN D3 Take  by mouth daily. ergocalciferol 50,000 unit capsule Commonly known as:  ERGOCALCIFEROL Take 1 Cap by mouth every seven (7) days. FLUoxetine 10 mg capsule Commonly known as:  PROzac  
  
 fluPHENAZine decanoate 25 mg/mL injection Commonly known as:  PROLIXIN  
  
 gabapentin 300 mg capsule Commonly known as:  NEURONTIN Take 1 Cap by mouth three (3) times daily. hydroCHLOROthiazide 25 mg tablet Commonly known as:  HYDRODIURIL  
take 1 tablet by mouth once daily  
  
 hydrocortisone 2.5 % rectal cream  
Commonly known as:  PROCTOSOL HC  
insert rectally four times a day as needed  
  
 ibuprofen 800 mg tablet Commonly known as:  MOTRIN  
take 1 tablet by mouth twice a day with food  
  
 isoniazid 300 mg tablet Commonly known as:  NYDRAZID Take 1 Tab by mouth daily. trihexyphenidyl 5 mg tablet Commonly known as:  ARTANE Take 5 mg by mouth three (3) times daily. Ingrid Leon Commonly known as:  [address] [employer] Use as needed Prescriptions Sent to Pharmacy Refills  
 cephALEXin (KEFLEX) 500 mg capsule 1 Sig: Take 1 Cap by mouth four (4) times daily. Class: Normal  
 Pharmacy: [address] [place] [address] #: [phone] Route: Oral  
  
Follow-up Instructions Return in about 3 months (around 3/21/2018), or if symptoms worsen or fail to improve. Please provide this summary of care documentation to your next provider. Your primary care clinician is listed as Claude Fabry. If you have any questions after today's visit, please call [phone].

## 2017-12-21 NOTE — PROGRESS NOTES
Hunter Bobby is a 75 y.o. male and presents with Skin Problem (boil buttocks) and PPD Reading (f/u)  . Subjective:  Hypertension Review:  The patient has hypertension  Diet and Lifestyle: generally follows a  low sodium diet, exercises sporadically  Home BP Monitoring: is not measured at home. Pertinent ROS: taking medications as instructed, no medication side effects noted, no TIA's, no chest pain on exertion, no dyspnea on exertion, no swelling of ankles. He has a history of hiv.he is under treatment      He has a boil in the buttock region          Review of Systems  Constitutional: negative for fevers, chills, anorexia and weight loss  Eyes:   negative for visual disturbance and irritation  ENT:   negative for tinnitus,sore throat,nasal congestion,ear pains. hoarseness  Respiratory:  negative for cough, hemoptysis, dyspnea,wheezing  CV:   negative for chest pain, palpitations, lower extremity edema  GI:   negative for nausea, vomiting, diarrhea, abdominal pain,melena  Endo:               negative for polyuria,polydipsia,polyphagia,heat intolerance  Genitourinary: negative for frequency, dysuria and hematuria  Integument:  negative for rash and pruritus  Hematologic:  negative for easy bruising and gum/nose bleeding  Musculoskel: negative for myalgias, arthralgias, back pain, muscle weakness, joint pain  Neurological:  negative for headaches, dizziness, vertigo, memory problems and gait   Behavl/Psych: negative for feelings of anxiety, depression, mood changes    Past Medical History:   Diagnosis Date    Chronic hepatitis C without mention of hepatic coma 4/7/2011    Degenerative Joint Disease 7/13/2011    Hematochezia 4/7/2011    [facility] 4/7/2011    Hypetension 4/7/2011    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 arm surgery due to abscess    HX OTHER SURGICAL      flank mole removal     Social History     Social History    Marital status: SINGLE     Spouse name: N/A    Number of children: N/A    Years of education: N/A     Social History Main Topics    Smoking status: Current Every Day Smoker     Packs/day: 0.25    Smokeless tobacco: Never Used    Alcohol use No    Drug use: No    Sexual activity: Not Asked     Other Topics Concern    None     Social History Narrative     Family History   Problem Relation Age of Onset    Kidney Disease Mother     Diabetes Mother     No Known Problems Father     Heart Disease Sister     Kidney Disease Sister     Diabetes Brother     Heart Disease Brother     Cancer Brother     Alcohol abuse Brother     Obesity Brother     Other Brother      Drugs     Current Outpatient Prescriptions   Medication Sig Dispense Refill    cephALEXin (KEFLEX) 500 mg capsule Take 1 Cap by mouth four (4) times daily. 28 Cap 1    ergocalciferol (ERGOCALCIFEROL) 50,000 unit capsule Take 1 Cap by mouth every seven (7) days. 12 Cap 3    hydrocortisone (PROCTOSOL HC) 2.5 % rectal cream insert rectally four times a day as needed 28.35 g 12    isoniazid (NYDRAZID) 300 mg tablet Take 1 Tab by mouth daily. 30 Tab 9    ibuprofen (MOTRIN) 800 mg tablet take 1 tablet by mouth twice a day with food 60 Tab 12    hydroCHLOROthiazide (HYDRODIURIL) 25 mg tablet take 1 tablet by mouth once daily 30 Tab 12    gabapentin (NEURONTIN) 300 mg capsule Take 1 Cap by mouth three (3) times daily. 90 Cap 12    Walker (MAYURI ROLLING WALKER) misc Use as needed 1 Each 0    cholecalciferol (VITAMIN D3) 1,000 unit cap Take  by mouth daily.       fluPHENAZine decanoate (PROLIXIN) 25 mg/mL injection       FLUoxetine (PROZAC) 10 mg capsule   0    trihexyphenidyl (ARTANE) 5 mg tablet Take 5 mg by mouth three (3) times daily. No Known Allergies    Objective:  Visit Vitals    /70 (BP 1 Location: Right arm, BP Patient Position: Sitting)    Pulse 88    Temp 98 °F (36.7 °C) (Oral)    Resp 20    Ht 5' 6\" (1.676 m)    Wt 140 lb (63.5 kg)    SpO2 98%    BMI 22.6 kg/m2     Physical Exam:   General appearance - alert, well appearing, and in no distress  Mental status - alert, oriented to person, place, and time  EYE-SHANNAN, EOMI, corneas normal, no foreign bodies  ENT-ENT exam normal, no neck nodes or sinus tenderness  Nose - normal and patent, no erythema, discharge or polyps  Mouth - mucous membranes moist, pharynx normal without lesions  Neck - supple, no significant adenopathy   Chest - clear to auscultation, no wheezes, rales or rhonchi, symmetric air entry   Heart - normal rate, regular rhythm, normal S1, S2, no murmurs, rubs, clicks or gallops   Abdomen - soft, nontender, nondistended, no masses or organomegaly  Lymph- no adenopathy palpable  Ext-peripheral pulses normal, no pedal edema, no clubbing or cyanosis  Skin-Warm and dry. no hyperpigmentation, vitiligo, or suspicious lesions  Neuro -alert, oriented, normal speech, no focal findings or movement disorder noted  Neck-normal C-spine, no tenderness, full ROM without pain  Feet-no nail deformities or callus formation with good pulses noted  Lt.buttock boil noted    Results for orders placed or performed in visit on 09/21/17   AMB POC LIPID PROFILE   Result Value Ref Range    Cholesterol (POC) 167     Triglycerides (POC) 114     HDL Cholesterol (POC) 72     LDL Cholesterol (POC) 72 MG/DL    Non-HDL Goal (POC) 95     TChol/HDL Ratio (POC) 2.3        Assessment/Plan:    ICD-10-CM ICD-9-CM    1. Essential hypertension I10 401.9    2. HIV (human immunodeficiency virus infection) ([facility] B20 V08    3. [facility]ia[facility] chronic condition [facility] F20.9 295.62    4.  Boil of buttock L02.32 680.5      Orders Placed This Encounter    cephALEXin (KEFLEX) 500 mg capsule Sig: Take 1 Cap by mouth four (4) times daily. Dispense:  28 Cap     Refill:  1     lose weight, follow low fat diet, follow low salt diet,Take 81mg aspirin daily  There are no Patient Instructions on file for this visit. Follow-up Disposition:  Return in about 3 months (around 3/21/2018), or if symptoms worsen or fail to improve. I have reviewed with the patient details of the assessment and plan and all questions were answered. Relevent patient education was performed. The most recent lab findings were reviewed with the patient. An After Visit Summary was printed and given to the patient.

## 2018-01-24 ENCOUNTER — OFFICE VISIT (OUTPATIENT)
Dept: INTERNAL MEDICINE CLINIC | Age: 68
End: 2018-01-24

## 2018-01-24 VITALS
TEMPERATURE: 97.9 F | OXYGEN SATURATION: 97 % | HEIGHT: 66 IN | BODY MASS INDEX: 22.66 KG/M2 | SYSTOLIC BLOOD PRESSURE: 110 MMHG | HEART RATE: 74 BPM | WEIGHT: 141 LBS | DIASTOLIC BLOOD PRESSURE: 68 MMHG | RESPIRATION RATE: 16 BRPM

## 2018-01-24 DIAGNOSIS — Z00.00 ROUTINE GENERAL MEDICAL EXAMINATION AT A HEALTH CARE FACILITY: Primary | ICD-10-CM

## 2018-01-24 DIAGNOSIS — B18.2 CHRONIC HEPATITIS C WITHOUT HEPATIC COMA (HCC): ICD-10-CM

## 2018-01-24 DIAGNOSIS — F20.9 SCHIZOPHRENIA, CHRONIC CONDITION (HCC): ICD-10-CM

## 2018-01-24 DIAGNOSIS — M50.30 DEGENERATIVE DISC DISEASE, CERVICAL: ICD-10-CM

## 2018-01-24 DIAGNOSIS — B20 HIV (HUMAN IMMUNODEFICIENCY VIRUS INFECTION) (HCC): ICD-10-CM

## 2018-01-24 DIAGNOSIS — E55.9 VITAMIN D DEFICIENCY: ICD-10-CM

## 2018-01-24 LAB
CHOLEST SERPL-MCNC: 154 MG/DL
HDLC SERPL-MCNC: 79 MG/DL
LDL CHOLESTEROL POC: 55 MG/DL
NON-HDL CHOLESTEROL, 011976: 76
TCHOL/HDL RATIO (POC): 2
TRIGL SERPL-MCNC: 104 MG/DL

## 2018-01-24 RX ORDER — BACLOFEN 10 MG/1
TABLET ORAL 3 TIMES DAILY
COMMUNITY
End: 2018-07-05 | Stop reason: ALTCHOICE

## 2018-01-24 NOTE — MR AVS SNAPSHOT
[address] [id number] 
[phone] Patient: Kanu Haque MRN: [id number] Visit Information Date & Time Provider Department Dept. Phone Encounter #  
 1/24/2018 10:00 AM Magi Ervin MD [address] [place] [phone] [id number] Follow-up Instructions Return in about 3 months (around 4/24/2018), or if symptoms worsen or fail to improve. Your Appointments 3/21/2018 11:45 AM  
ROUTINE CARE with Magi Ervin MD  
[facility] [place] ([facility]) Appt Note: 3 mo fu  
 [address] [place] [id number]  
[phone]  
  
   
 [address] [place] [id number] Upcoming Health Maintenance Date Due  
 GLAUCOMA SCREENING Q2Y 11/22/2014 Pneumococcal 65+ High/Highest Risk (2 of 2 - PPSV23) 10/8/2017 DTaP/Tdap/Td series (1 - Tdap) 4/26/2018* FOBT Q 1 YEAR AGE 50-75 5/16/2018 MEDICARE YEARLY EXAM 5/26/2018 *Topic was postponed. The date shown is not the original due date. Allergies as of 1/24/2018  Review Complete On: 12/21/2017 By: Magi Ervin MD  
 No Known Allergies Current Immunizations  Reviewed on 12/5/2013 Name Date Influenza Vaccine 12/5/2013 Influenza Vaccine Split 10/8/2012 TB Skin Test (PPD) Intradermal 6/21/2017 ZZZ-RETIRED (DO NOT USE) Pneumococcal Vaccine (Unspecified Type) 10/8/2012 Not reviewed this visit You Were Diagnosed With   
  
 Codes Comments Routine general medical examination at a health care facility    -  Primary ICD-10-CM: Z00.00 ICD-9-CM: V70.0 Chronic hepatitis C without hepatic coma (HCC)     ICD-10-CM: B18.2 ICD-9-CM: 070.54 HIV (human immunodeficiency virus infection) [facility]     ICD-10-CM: B20 
ICD-9-CM: V08 Schizophrenia, chronic condition ([facility]     ICD-10-CM: F20.9 ICD-9-CM: 295.62   
 Vitamin D deficiency     ICD-10-CM: E55.9 ICD-9-CM: 268.9 Degenerative disc disease, cervical     ICD-10-CM: M50.30 ICD-9-CM: 722.4 Vitals BP Pulse Temp Resp Height(growth percentile) Weight(growth percentile) 110/68 74 97.9 °F (36.6 °C) (Oral) 16 5' 6\" (1.676 m) 141 lb (64 kg) SpO2 BMI Smoking Status 97% 22.76 kg/m2 Current Every Day Smoker Vitals History BMI and BSA Data Body Mass Index Body Surface Area  
 22.76 kg/m 2 1.73 m 2 Preferred Pharmacy Pharmacy Name Phone [facility] [phone] Your Updated Medication List  
  
   
This list is accurate as of: 1/24/18 11:43 AM.  Always use your most recent med list.  
  
  
  
  
 baclofen 10 mg tablet Commonly known as:  LIORESAL Take  by mouth three (3) times daily. cholecalciferol 1,000 unit Cap Commonly known as:  VITAMIN D3 Take  by mouth daily. ergocalciferol 50,000 unit capsule Commonly known as:  ERGOCALCIFEROL Take 1 Cap by mouth every seven (7) days. FLUoxetine 10 mg capsule Commonly known as:  PROzac  
  
 fluPHENAZine decanoate 25 mg/mL injection Commonly known as:  PROLIXIN  
  
 gabapentin 300 mg capsule Commonly known as:  NEURONTIN Take 1 Cap by mouth three (3) times daily. hydroCHLOROthiazide 25 mg tablet Commonly known as:  HYDRODIURIL  
take 1 tablet by mouth once daily  
  
 hydrocortisone 2.5 % rectal cream  
Commonly known as:  PROCTOSOL HC  
insert rectally four times a day as needed  
  
 ibuprofen 800 mg tablet Commonly known as:  MOTRIN  
take 1 tablet by mouth twice a day with food  
  
 isoniazid 300 mg tablet Commonly known as:  NYDRAZID Take 1 Tab by mouth daily. trihexyphenidyl 5 mg tablet Commonly known as:  ARTANE Take 5 mg by mouth three (3) times daily. Olga Mars Commonly known as:  [address] Use as needed We Performed the Following AMB POC LIPID PROFILE [76021 CPT(R)] CBC W/O DIFF [63658 CPT(R)] METABOLIC PANEL, COMPREHENSIVE [72175 CPT(R)] Follow-up Instructions Return in about 3 months (around 2018), or if symptoms worsen or fail to improve. Patient Instructions [facility] Activation Thank you for requesting access to [facility]ial[facility]. Please follow the instructions below to securely access and download your online medical record. [facility][pecialists allows you to send messages to your doctor, view your test results, renew your prescriptions, schedule appointments, and more. How Do I Sign Up? 1. In your internet browser, go to [email or web address] 
2. Click on the First Time User? Click Here link in the Sign In box. You will be redirect to the New Member Sign Up page. 3. Enter your [facility][pecialists Access Code exactly as it appears below. You will not need to use this code after youve completed the sign-up process. If you do not sign up before the expiration date, you must request a new code. [facility][pecialists Access Code: Activation code not generated Current Home Inventory S[pecialists Status: Patient Declined (This is the date your [facility]ialists access code will ) 4. Enter the last four digits of your Social Security Number (xxxx) and Date of Birth (mm/dd/yyyy) as indicated and click Submit. You will be taken to the next sign-up page. 5. Create a Home [facility] S[pecialists ID. This will be your [facility]ialists login ID and cannot be changed, so think of one that is secure and easy to remember. 6. Create a Home [facility] S[pecialists password. You can change your password at any time. 7. Enter your Password Reset Question and Answer. This can be used at a later time if you forget your password. 8. Enter your e-mail address. You will receive e-mail notification when new information is available in [address] 9. Click Sign Up. You can now view and download portions of your medical record.  
10. Click the Download Summary menu link to download a portable copy of your medical information. Additional Information If you have questions, please visit the Frequently Asked Questions section of the [employer] website at [email or web address] Remember, [facility] [employer] is NOT to be used for urgent needs. For medical emergencies, dial 911. Please provide this summary of care documentation to your next provider. Your primary care clinician is listed as Marcin Da Silva. If you have any questions after today's visit, please call [phone].

## 2018-01-24 NOTE — PATIENT INSTRUCTIONS
[employer] Activation    Thank you for requesting access to [employer]. Please follow the instructions below to securely access and download your online medical record. [employer] allows you to send messages to your doctor, view your test results, renew your prescriptions, schedule appointments, and more. How Do I Sign Up? 1. In your internet browser, go to [email or web address]  2. Click on the First Time User? Click Here link in the Sign In box. You will be redirect to the New Member Sign Up page. 3. Enter your net[employer]entry Access Code exactly as it appears below. You will not need to use this code after youve completed the sign-up process. If you do not sign up before the expiration date, you must request a new code. net[employer]entry Access Code: Activation code not generated  Current netZentry Status: Patient Declined (This is the date your netZentry access code will )    4. Enter the last four digits of your Social Security Number (xxxx) and Date of Birth (mm/dd/yyyy) as indicated and click Submit. You will be taken to the next sign-up page. 5. Create a netZentry ID. This will be your netZentry login ID and cannot be changed, so think of one that is secure and easy to remember. 6. Create a net[employer]entry password. You can change your password at any time. 7. Enter your Password Reset Question and Answer. This can be used at a later time if you forget your password. 8. Enter your e-mail address. You will receive e-mail notification when new information is available in [address] 9. Click Sign Up. You can now view and download portions of your medical record. 10. Click the Download Summary menu link to download a portable copy of your medical information. Additional Information    If you have questions, please visit the Frequently Asked Questions section of the [employer] website at [email or web address] Remember, [employer]entry is NOT to be used for urgent needs. For medical emergencies, dial 911.

## 2018-01-24 NOTE — PROGRESS NOTES
Valentino Pates is a 75 y.o. male and presents with Complete Physical (nursing home possibility)  . Subjective:  Hypertension Review:  The patient has hypertension  Diet and Lifestyle: generally follows a  low sodium diet, exercises sporadically  Home BP Monitoring: is not measured at home. Pertinent ROS: taking medications as instructed, no medication side effects noted, no TIA's, no chest pain on exertion, no dyspnea on exertion, no swelling of ankles. He has a history of hiv.he is under treatment      He has a boil in the buttock region    He has hiv history    He has a history of positive ppd he is under treatment    He had a vitamin d deficiency          Review of Systems  Constitutional: negative for fevers, chills, anorexia and weight loss  Eyes:   negative for visual disturbance and irritation  ENT:   negative for tinnitus,sore throat,nasal congestion,ear pains. hoarseness  Respiratory:  negative for cough, hemoptysis, dyspnea,wheezing  CV:   negative for chest pain, palpitations, lower extremity edema  GI:   negative for nausea, vomiting, diarrhea, abdominal pain,melena  Endo:               negative for polyuria,polydipsia,polyphagia,heat intolerance  Genitourinary: negative for frequency, dysuria and hematuria  Integument:  negative for rash and pruritus  Hematologic:  negative for easy bruising and gum/nose bleeding  Musculoskel: negative for myalgias, arthralgias, back pain, muscle weakness, joint pain  Neurological:  negative for headaches, dizziness, vertigo, memory problems and gait   Behavl/Psych: negative for feelings of anxiety, depression, mood changes    Past Medical History:   Diagnosis Date    Chronic hepatitis C without mention of hepatic coma 4/7/2011    Degenerative Joint Disease 7/13/2011    Hematochezia 4/7/2011    [facility] immunodeficiency virus [facility] 4/7/2011    Hypetension 4/7/2011    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 arm surgery due to abscess    HX OTHER SURGICAL      flank mole removal     Social History     Social History    Marital status: SINGLE     Spouse name: N/A    Number of children: N/A    Years of education: N/A     Social History Main Topics    Smoking status: Current Every Day Smoker     Packs/day: 0.25    Smokeless tobacco: Never Used    Alcohol use No    Drug use: No    Sexual activity: Not Asked     Other Topics Concern    None     Social History Narrative     Family History   Problem Relation Age of Onset    Kidney Disease Mother     Diabetes Mother     No Known Problems Father     Heart Disease Sister     Kidney Disease Sister     Diabetes Brother     Heart Disease Brother     Cancer Brother     Alcohol abuse Brother     Obesity Brother     Other Brother      Drugs     Current Outpatient Prescriptions   Medication Sig Dispense Refill    baclofen (LIORESAL) 10 mg tablet Take  by mouth three (3) times daily.  ergocalciferol (ERGOCALCIFEROL) 50,000 unit capsule Take 1 Cap by mouth every seven (7) days. 12 Cap 3    hydrocortisone (PROCTOSOL HC) 2.5 % rectal cream insert rectally four times a day as needed 28.35 g 12    isoniazid (NYDRAZID) 300 mg tablet Take 1 Tab by mouth daily. 30 Tab 9    ibuprofen (MOTRIN) 800 mg tablet take 1 tablet by mouth twice a day with food 60 Tab 12    hydroCHLOROthiazide (HYDRODIURIL) 25 mg tablet take 1 tablet by mouth once daily 30 Tab 12    gabapentin (NEURONTIN) 300 mg capsule Take 1 Cap by mouth three (3) times daily. 90 Cap 12    fluPHENAZine decanoate (PROLIXIN) 25 mg/mL injection       Walker (MAYURI ROLLING WALKER) misc Use as needed 1 Each 0    cholecalciferol (VITAMIN D3) 1,000 unit cap Take  by mouth daily.       FLUoxetine (PROZAC) 10 mg capsule   0    trihexyphenidyl (ARTANE) 5 mg tablet Take 5 mg by mouth three (3) times daily. No Known Allergies    Objective:  Visit Vitals    /68    Pulse 74    Temp 97.9 °F (36.6 °C) (Oral)    Resp 16    Ht 5' 6\" (1.676 m)    Wt 141 lb (64 kg)    SpO2 97%    BMI 22.76 kg/m2     Physical Exam:   General appearance - alert, well appearing, and in no distress  Mental status - alert, oriented to person, place, and time  EYE-SHANNAN, EOMI, corneas normal, no foreign bodies  ENT-ENT exam normal, no neck nodes or sinus tenderness  Nose - normal and patent, no erythema, discharge or polyps  Mouth - mucous membranes moist, pharynx normal without lesions  Neck - supple, no significant adenopathy   Chest - clear to auscultation, no wheezes, rales or rhonchi, symmetric air entry   Heart - normal rate, regular rhythm, normal S1, S2, no murmurs, rubs, clicks or gallops   Abdomen - soft, nontender, nondistended, no masses or organomegaly  Lymph- no adenopathy palpable  Ext-peripheral pulses normal, no pedal edema, no clubbing or cyanosis  Skin-Warm and dry. no hyperpigmentation, vitiligo, or suspicious lesions  Neuro -alert, oriented, normal speech, no focal findings or movement disorder noted  Neck-normal C-spine, no tenderness, full ROM without pain  Feet-no nail deformities or callus formation with good pulses noted  Lt.buttock boil noted    Results for orders placed or performed in visit on 09/21/17   AMB POC LIPID PROFILE   Result Value Ref Range    Cholesterol (POC) 167     Triglycerides (POC) 114     HDL Cholesterol (POC) 72     LDL Cholesterol (POC) 72 MG/DL    Non-HDL Goal (POC) 95     TChol/HDL Ratio (POC) 2.3        Assessment/Plan:    ICD-10-CM ICD-9-CM    1. Routine general medical examination at a health care facility Z00.00 V70.0 CBC W/O DIFF      METABOLIC PANEL, COMPREHENSIVE      AMB POC LIPID PROFILE      baclofen (LIORESAL) 10 mg tablet   2.  Chronic hepatitis C without hepatic coma (HCC) B18.2 070.54    3. HIV (human immunodeficiency virus infection) ([facility] B20 V08    4. Schizophrenia, chronic condition ([facility] F20.9 295.62    5. Vitamin D deficiency E55.9 268.9    6. Degenerative disc disease, cervical M50.30 722.4      Orders Placed This Encounter    CBC W/O DIFF    METABOLIC PANEL, COMPREHENSIVE    AMB POC LIPID PROFILE    baclofen (LIORESAL) 10 mg tablet     Sig: Take  by mouth three (3) times daily. lose weight, follow low fat diet, follow low salt diet,Take 81mg aspirin daily  Patient Instructions   MyChart Activation    Thank you for requesting access to [employer]. Please follow the instructions below to securely access and download your online medical record. [employer] allows you to send messages to your doctor, view your test results, renew your prescriptions, schedule appointments, and more. How Do I Sign Up? 1. In your internet browser, go to [email or web address]  2. Click on the First Time User? Click Here link in the Sign In box. You will be redirect to the New Member Sign Up page. 3. Enter your [employer] Access Code exactly as it appears below. You will not need to use this code after youve completed the sign-up process. If you do not sign up before the expiration date, you must request a new code. CyphortharCrestock Access Code: Activation code not generated  Current [employer] Status: Patient Declined (This is the date your Detectentt access code will )    4. Enter the last four digits of your Social Security Number (xxxx) and Date of Birth (mm/dd/yyyy) as indicated and click Submit. You will be taken to the next sign-up page. 5. Create a [employer] ID. This will be your [employer] login ID and cannot be changed, so think of one that is secure and easy to remember. 6. Create a [employer] password. You can change your password at any time. 7. Enter your Password Reset Question and Answer. This can be used at a later time if you forget your password.    8. Enter your e-mail address. You will receive e-mail notification when new information is available in [address] 9. Click Sign Up. You can now view and download portions of your medical record. 10. Click the Download Summary menu link to download a portable copy of your medical information. Additional Information    If you have questions, please visit the Frequently Asked Questions section of the [employer] website at [email or web address] Remember, [employer] is NOT to be used for urgent needs. For medical emergencies, dial 911. Follow-up Disposition:  Return in about 3 months (around 4/24/2018), or if symptoms worsen or fail to improve. I have reviewed with the patient details of the assessment and plan and all questions were answered. Relevent patient education was performed. The most recent lab findings were reviewed with the patient. An After Visit Summary was printed and given to the patient.

## 2018-01-25 LAB
ALBUMIN SERPL-MCNC: 4.2 G/DL (ref 3.6–4.8)
ALBUMIN/GLOB SERPL: 1.2 {RATIO} (ref 1.2–2.2)
ALP SERPL-CCNC: 89 IU/L (ref 39–117)
ALT SERPL-CCNC: 30 IU/L (ref 0–44)
AST SERPL-CCNC: 44 IU/L (ref 0–40)
BILIRUB SERPL-MCNC: 0.3 MG/DL (ref 0–1.2)
BUN SERPL-MCNC: 14 MG/DL (ref 8–27)
BUN/CREAT SERPL: 17 (ref 10–24)
CALCIUM SERPL-MCNC: 9.3 MG/DL (ref 8.6–10.2)
CHLORIDE SERPL-SCNC: 97 MMOL/L (ref 96–106)
CO2 SERPL-SCNC: 22 MMOL/L (ref 18–29)
CREAT SERPL-MCNC: 0.83 MG/DL (ref 0.76–1.27)
ERYTHROCYTE [DISTWIDTH] IN BLOOD BY AUTOMATED COUNT: 14.2 % (ref 12.3–15.4)
GFR SERPLBLD CREATININE-BSD FMLA CKD-EPI: 105 ML/MIN/1.73
GFR SERPLBLD CREATININE-BSD FMLA CKD-EPI: 90 ML/MIN/1.73
GLOBULIN SER CALC-MCNC: 3.6 G/DL (ref 1.5–4.5)
GLUCOSE SERPL-MCNC: 103 MG/DL (ref 65–99)
HCT VFR BLD AUTO: 39.4 % (ref 37.5–51)
HGB BLD-MCNC: 13 G/DL (ref 13–17.7)
MCH RBC QN AUTO: 29.8 PG (ref 26.6–33)
MCHC RBC AUTO-ENTMCNC: 33 G/DL (ref 31.5–35.7)
MCV RBC AUTO: 90 FL (ref 79–97)
PLATELET # BLD AUTO: 315 X10E3/UL (ref 150–379)
POTASSIUM SERPL-SCNC: 4.8 MMOL/L (ref 3.5–5.2)
PROT SERPL-MCNC: 7.8 G/DL (ref 6–8.5)
RBC # BLD AUTO: 4.36 X10E6/UL (ref 4.14–5.8)
SODIUM SERPL-SCNC: 140 MMOL/L (ref 134–144)
WBC # BLD AUTO: 4.9 X10E3/UL (ref 3.4–10.8)

## 2018-02-16 ENCOUNTER — HOSPITAL ENCOUNTER (OUTPATIENT)
Dept: GENERAL RADIOLOGY | Age: 68
Discharge: HOME | End: 2018-02-16
Payer: MEDICAID

## 2018-02-16 ENCOUNTER — OFFICE VISIT (OUTPATIENT)
Dept: INTERNAL MEDICINE CLINIC | Age: 68
End: 2018-02-16

## 2018-02-16 VITALS
RESPIRATION RATE: 17 BRPM | SYSTOLIC BLOOD PRESSURE: 128 MMHG | OXYGEN SATURATION: 94 % | HEIGHT: 66 IN | DIASTOLIC BLOOD PRESSURE: 70 MMHG | HEART RATE: 69 BPM | WEIGHT: 138 LBS | TEMPERATURE: 98.6 F | BODY MASS INDEX: 22.18 KG/M2

## 2018-02-16 DIAGNOSIS — J20.9 ACUTE BRONCHITIS, UNSPECIFIED ORGANISM: ICD-10-CM

## 2018-02-16 DIAGNOSIS — B20 HIV (HUMAN IMMUNODEFICIENCY VIRUS INFECTION) (HCC): ICD-10-CM

## 2018-02-16 DIAGNOSIS — J20.9 ACUTE BRONCHITIS, UNSPECIFIED ORGANISM: Primary | ICD-10-CM

## 2018-02-16 DIAGNOSIS — E03.4 HYPOTHYROIDISM DUE TO ACQUIRED ATROPHY OF THYROID: ICD-10-CM

## 2018-02-16 PROCEDURE — 71046 X-RAY EXAM CHEST 2 VIEWS: CPT

## 2018-02-16 RX ORDER — ALBUTEROL SULFATE 0.83 MG/ML
2.5 SOLUTION RESPIRATORY (INHALATION) ONCE
Qty: 1 EACH | Refills: 0
Start: 2018-02-16 | End: 2018-02-16

## 2018-02-16 RX ORDER — ALBUTEROL SULFATE 90 UG/1
2 AEROSOL, METERED RESPIRATORY (INHALATION)
Qty: 1 INHALER | Refills: 12 | Status: SHIPPED | OUTPATIENT
Start: 2018-02-16 | End: 2018-03-22 | Stop reason: ALTCHOICE

## 2018-02-16 RX ORDER — LEVOFLOXACIN 500 MG/1
500 TABLET, FILM COATED ORAL DAILY
Qty: 10 TAB | Refills: 0 | Status: SHIPPED | OUTPATIENT
Start: 2018-02-16 | End: 2018-03-22 | Stop reason: ALTCHOICE

## 2018-02-16 RX ORDER — PREDNISONE 10 MG/1
TABLET ORAL
Qty: 21 TAB | Refills: 0 | Status: SHIPPED | OUTPATIENT
Start: 2018-02-16 | End: 2018-03-22 | Stop reason: ALTCHOICE

## 2018-02-16 RX ORDER — METHYLPREDNISOLONE SODIUM SUCCINATE 40 MG/ML
40 INJECTION, POWDER, LYOPHILIZED, FOR SOLUTION INTRAMUSCULAR; INTRAVENOUS ONCE
Qty: 1 VIAL | Refills: 0
Start: 2018-02-16 | End: 2018-02-16

## 2018-02-16 NOTE — MR AVS SNAPSHOT
[address] [address] 
[phone] Patient: Nicholas Steven MRN: [id number] Visit Information Date & Time Provider Department Dept. Phone Encounter #  
 2/16/2018 10:15 AM Gena Malagon MD [address] [place] [address] [phone] [id number] Follow-up Instructions Return in about 4 weeks (around 3/16/2018), or if symptoms worsen or fail to improve. Follow-up and Disposition History Your Appointments 3/16/2018 10:30 AM  
ROUTINE CARE with Gena Malagon MD  
[facility]esh [facility] ([facility]) Appt Note: 4 week check up [address] [place] [address]  
[phone]  
  
   
 [address] [address]  
  
    
 3/21/2018 11:45 AM  
ROUTINE CARE with Gena Malagon MD  
[facility] - Mukesh Bah ([facility]) Appt Note: 3 mo fu  
 [address] [place] [phone]  
[phone] Upcoming Health Maintenance Date Due  
 GLAUCOMA SCREENING Q2Y 11/22/2014 Pneumococcal 65+ High/Highest Risk (2 of 2 - PPSV23) 10/8/2017 DTaP/Tdap/Td series (1 - Tdap) 4/26/2018* FOBT Q 1 YEAR AGE 50-75 5/16/2018 MEDICARE YEARLY EXAM 5/26/2018 *Topic was postponed. The date shown is not the original due date. Allergies as of 2/16/2018  Review Complete On: 2/16/2018 By: Rubio Tripathi LPN No Known Allergies Current Immunizations  Reviewed on 12/5/2013 Name Date Influenza Vaccine 12/5/2013 Influenza Vaccine Split 10/8/2012 TB Skin Test (PPD) Intradermal 6/21/2017 ZZZ-RETIRED (DO NOT USE) Pneumococcal Vaccine (Unspecified Type) 10/8/2012 Not reviewed this visit You Were Diagnosed With   
  
 Codes Comments Acute bronchitis, unspecified organism    -  Primary ICD-10-CM: J20.9 ICD-9-CM: 466.0 HIV (human immunodeficiency virus infection) ([facility]     ICD-10-CM: B20 
ICD-9-CM: V08 Hypothyroidism due to acquired atrophy of thyroid     ICD-10-CM: E03.4 ICD-9-CM: 244.8, 246.8 Vitals BP Pulse Temp Resp Height(growth percentile) Weight(growth percentile) 128/70 (BP 1 Location: Right arm, BP Patient Position: Sitting) 69 98.6 °F (37 °C) 17 5' 6\" (1.676 m) 138 lb (62.6 kg) SpO2 BMI Smoking Status 94% 22.27 kg/m2 Current Every Day Smoker BMI and BSA Data Body Mass Index Body Surface Area  
 22.27 kg/m 2 1.71 m 2 Preferred Pharmacy Pharmacy Name Phone [facility] [phone] Your Updated Medication List  
  
   
This list is accurate as of: 2/16/18 12:50 PM.  Always use your most recent med list.  
  
  
  
  
 * albuterol 90 mcg/actuation inhaler Commonly known as:  PROVENTIL HFA, VENTOLIN HFA, PROAIR HFA Take 2 Puffs by inhalation every four (4) hours as needed for Wheezing. * albuterol 2.5 mg /3 mL (0.083 %) nebulizer solution Commonly known as:  PROVENTIL VENTOLIN  
3 mL by Nebulization route once for 1 dose. baclofen 10 mg tablet Commonly known as:  LIORESAL Take  by mouth three (3) times daily. cholecalciferol 1,000 unit Cap Commonly known as:  VITAMIN D3 Take  by mouth daily. ergocalciferol 50,000 unit capsule Commonly known as:  ERGOCALCIFEROL Take 1 Cap by mouth every seven (7) days. FLUoxetine 10 mg capsule Commonly known as:  PROzac  
  
 fluPHENAZine decanoate 25 mg/mL injection Commonly known as:  PROLIXIN  
  
 gabapentin 300 mg capsule Commonly known as:  NEURONTIN Take 1 Cap by mouth three (3) times daily. hydroCHLOROthiazide 25 mg tablet Commonly known as:  HYDRODIURIL  
take 1 tablet by mouth once daily  
  
 hydrocortisone 2.5 % rectal cream  
Commonly known as:  PROCTOSOL HC  
 insert rectally four times a day as needed  
  
 ibuprofen 800 mg tablet Commonly known as:  MOTRIN  
take 1 tablet by mouth twice a day with food  
  
 isoniazid 300 mg tablet Commonly known as:  NYDRAZID Take 1 Tab by mouth daily. levoFLOXacin 500 mg tablet Commonly known as:  Ebenezer Newer Take 1 Tab by mouth daily. methylPREDNISolone 40 mg Solr solution Commonly known as:  SOLU-MEDROL 40 mg by IntraVENous route once for 1 dose. predniSONE 10 mg tablet Commonly known as:  DELTASONE  
6 tabs today and reduce by 1 tab daily  
  
 trihexyphenidyl 5 mg tablet Commonly known as:  ARTANE Take 5 mg by mouth three (3) times daily. Chinyere Curia Commonly known as:  [address] Use as needed * Notice: This list has 2 medication(s) that are the same as other medications prescribed for you. Read the directions carefully, and ask your doctor or other care provider to review them with you. Prescriptions Sent to Pharmacy Refills  
 albuterol (PROVENTIL HFA, VENTOLIN HFA, PROAIR HFA) 90 mcg/actuation inhaler 12 Sig: Take 2 Puffs by inhalation every four (4) hours as needed for Wheezing. Class: Normal  
 Pharmacy: [address] [place] [address] Ph #: [phone] Route: Inhalation  
 levoFLOXacin (LEVAQUIN) 500 mg tablet 0 Sig: Take 1 Tab by mouth daily. Class: Normal  
 Pharmacy: [address] [place] [address] Ph #: [phone] Route: Oral  
 predniSONE (DELTASONE) 10 mg tablet 0 Si tabs today and reduce by 1 tab daily Class: Normal  
 Pharmacy: [address] [place] [address] Ph #: [phone] We Performed the Following ALBUTEROL, INHAL. SOL., FDA-APPROVED FINAL, NON-COMPOUND UNIT DOSE, 1 MG [ HCPCS] INHAL RX, AIRWAY OBST/DX SPUTUM INDUCT [id number] CPT(R)] METHYLPREDNISOLONE INJECTION [ HCP] TX THER/PROPH/DIAG INJECTION, SUBCUT/IM [id number] CPT(R)] Follow-up Instructions Return in about 4 weeks (around 3/16/2018), or if symptoms worsen or fail to improve. To-Do List   
 2018 Imaging:  XR CHEST PA LAT Patient Instructions MyChart Activation Thank you for requesting access to [employer]. Please follow the instructions below to securely access and download your online medical record. [employer] allows you to send messages to your doctor, view your test results, renew your prescriptions, schedule appointments, and more. How Do I Sign Up? 1. In your internet browser, go to [email or web address] 
2. Click on the First Time User? Click Here link in the Sign In box. You will be redirect to the New Member Sign Up page. 3. Enter your [employer] Access Code exactly as it appears below. You will not need to use this code after youve completed the sign-up process. If you do not sign up before the expiration date, you must request a new code. [employer] Access Code: Activation code not generated Current [employer] Status: Patient Declined (This is the date your [employer] access code will ) 4. Enter the last four digits of your Social Security Number (xxxx) and Date of Birth (mm/dd/yyyy) as indicated and click Submit. You will be taken to the next sign-up page. 5. Create a [employer] ID. This will be your [employer] login ID and cannot be changed, so think of one that is secure and easy to remember. 6. Create a [employer] password. You can change your password at any time. 7. Enter your Password Reset Question and Answer. This can be used at a later time if you forget your password. 8. Enter your e-mail address. You will receive e-mail notification when new information is available in [address] 9. Click Sign Up. You can now view and download portions of your medical record. 10. Click the Download Summary menu link to download a portable copy of your medical information. Additional Information If you have questions, please visit the Frequently Asked Questions section of the [employer] website at [email or web address][employer] [email or web address] Remember, [employer] is NOT to be used for urgent needs. For medical emergencies, dial 911. Please provide this summary of care documentation to your next provider. Your primary care clinician is listed as Maryann Lau. If you have any questions after today's visit, please call [phone].

## 2018-02-16 NOTE — PATIENT INSTRUCTIONS
[email or web address] Activation    Thank you for requesting access to [employer]. Please follow the instructions below to securely access and download your online medical record. [employer] allows you to send messages to your doctor, view your test results, renew your prescriptions, schedule appointments, and more. How Do I Sign Up? 1. In your internet browser, go to [email or web address]  2. Click on the First Time User? Click Here link in the Sign In box. You will be redirect to the New Member Sign Up page. 3. Enter your [employer] Access Code exactly as it appears below. You will not need to use this code after youve completed the sign-up process. If you do not sign up before the expiration date, you must request a new code. [employer] Access Code: Activation code not generated  Current [employer] Status: Patient Declined (This is the date your [employer] access code will )    4. Enter the last four digits of your Social Security Number (xxxx) and Date of Birth (mm/dd/yyyy) as indicated and click Submit. You will be taken to the next sign-up page. 5. Create a [employer] ID. This will be your [employer] login ID and cannot be changed, so think of one that is secure and easy to remember. 6. Create a [employer] password. You can change your password at any time. 7. Enter your Password Reset Question and Answer. This can be used at a later time if you forget your password. 8. Enter your e-mail address. You will receive e-mail notification when new information is available in [address] 9. Click Sign Up. You can now view and download portions of your medical record. 10. Click the Download Summary menu link to download a portable copy of your medical information. Additional Information    If you have questions, please visit the Frequently Asked Questions section of the [employer] website at [email or web address] Remember, [employer] is NOT to be used for urgent needs. For medical emergencies, dial 911.

## 2018-02-16 NOTE — PROGRESS NOTES
Elvin Roman is a 75 y.o. male and presents with Cold Symptoms and Cough (sputum yellow-green)  . Subjective:  Upper respiratory infection Review:  Elvin Roman is a 75 y.o. male who complains of nasal congestion,sore throat,chills,fever, productive cough, myalgias and wheezing for the past few days, gradually worsening since that time. He denies a history of shortness of breath. Evaluation to date: none. Treatment to date: decongestants, antihistamines, cough suppressants, OTC products. Patient does not smoke cigarettes. Relevant PMH: No pertinent additional PMH. He has a history of hiv and is under treatment      He is being followed for vitamin d deficiency. Review of Systems  Constitutional: negative for fevers, chills, anorexia and weight loss  Eyes:   negative for visual disturbance and irritation  ENT:   negative for tinnitus,sore throat,nasal congestion,ear pains. hoarseness  Respiratory:  cough,wheezing  CV:   negative for chest pain, palpitations, lower extremity edema  GI:   negative for nausea, vomiting, diarrhea, abdominal pain,melena  Endo:               negative for polyuria,polydipsia,polyphagia,heat intolerance  Genitourinary: negative for frequency, dysuria and hematuria  Integument:  negative for rash and pruritus  Hematologic:  negative for easy bruising and gum/nose bleeding  Musculoskel: negative for myalgias, arthralgias, back pain, muscle weakness, joint pain  Neurological:  negative for headaches, dizziness, vertigo, memory problems and gait   Behavl/Psych: negative for feelings of anxiety, depression, mood changes    Past Medical History:   Diagnosis Date    Chronic hepatitis C without mention of hepatic coma 4/7/2011    Degenerative Joint Disease 7/13/2011    Hematochezia 4/7/2011    HIV [facility]human immunodeficiency virus infection) ([facility] 4/7/2011    Hypetension 4/7/2011    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 arm surgery due to abscess    HX OTHER SURGICAL      flank mole removal     Social History     Social History    Marital status: SINGLE     Spouse name: N/A    Number of children: N/A    Years of education: N/A     Social History Main Topics    Smoking status: Current Every Day Smoker     Packs/day: 0.25    Smokeless tobacco: Never Used    Alcohol use No    Drug use: No    Sexual activity: Not Asked     Other Topics Concern    None     Social History Narrative     Family History   Problem Relation Age of Onset    Kidney Disease Mother     Diabetes Mother     No Known Problems Father     Heart Disease Sister     Kidney Disease Sister     Diabetes Brother     Heart Disease Brother     Cancer Brother     Alcohol abuse Brother     Obesity Brother     Other Brother      Drugs     Current Outpatient Prescriptions   Medication Sig Dispense Refill    albuterol (PROVENTIL HFA, VENTOLIN HFA, PROAIR HFA) 90 mcg/actuation inhaler Take 2 Puffs by inhalation every four (4) hours as needed for Wheezing. 1 Inhaler 12    levoFLOXacin (LEVAQUIN) 500 mg tablet Take 1 Tab by mouth daily. 10 Tab 0    predniSONE (DELTASONE) 10 mg tablet 6 tabs today and reduce by 1 tab daily 21 Tab 0    baclofen (LIORESAL) 10 mg tablet Take  by mouth three (3) times daily.  ergocalciferol (ERGOCALCIFEROL) 50,000 unit capsule Take 1 Cap by mouth every seven (7) days. 12 Cap 3    hydrocortisone (PROCTOSOL HC) 2.5 % rectal cream insert rectally four times a day as needed 28.35 g 12    isoniazid (NYDRAZID) 300 mg tablet Take 1 Tab by mouth daily.  30 Tab 9    ibuprofen (MOTRIN) 800 mg tablet take 1 tablet by mouth twice a day with food 60 Tab 12    hydroCHLOROthiazide (HYDRODIURIL) 25 mg tablet take 1 tablet by mouth once daily 30 Tab 12    gabapentin (NEURONTIN) 300 mg capsule Take 1 Cap by mouth three (3) times daily. 90 Cap 12    fluPHENAZine decanoate (PROLIXIN) 25 mg/mL injection       Walker (MAYURI ROLLING WALKER) misc Use as needed 1 Each 0    cholecalciferol (VITAMIN D3) 1,000 unit cap Take  by mouth daily.  FLUoxetine (PROZAC) 10 mg capsule   0    trihexyphenidyl (ARTANE) 5 mg tablet Take 5 mg by mouth three (3) times daily. No Known Allergies    Objective:  Visit Vitals    /70 (BP 1 Location: Right arm, BP Patient Position: Sitting)    Pulse 69    Temp 98.6 °F (37 °C)    Resp 17    Ht 5' 6\" (1.676 m)    Wt 138 lb (62.6 kg)    SpO2 94%    BMI 22.27 kg/m2     Physical Exam:   General appearance - alert, well appearing, and in no distress  Mental status - alert, oriented to person, place, and time  EYE-SHANNAN, EOMI, corneas normal, no foreign bodies  ENT-ENT exam normal, no neck nodes or sinus tenderness  Nose - normal and patent, no erythema, discharge or polyps  Mouth - mucous membranes moist, pharynx normal without lesions  Neck - supple, no significant adenopathy   Chest wheezes,  rhonchi, symmetric air entry   Heart - normal rate, regular rhythm, normal S1, S2, no murmurs, rubs, clicks or gallops   Abdomen - soft, nontender, nondistended, no masses or organomegaly  Lymph- no adenopathy palpable  Ext-peripheral pulses normal, no pedal edema, no clubbing or cyanosis  Skin-Warm and dry.  no hyperpigmentation, vitiligo, or suspicious lesions  Neuro -alert, oriented, normal speech, no focal findings or movement disorder noted  Neck-normal C-spine, no tenderness, full ROM without pain  Feet-no nail deformities or callus formation with good pulses noted      Results for orders placed or performed in visit on 01/24/18   CBC W/O DIFF   Result Value Ref Range    WBC 4.9 3.4 - 10.8 x10E3/uL    RBC 4.36 4.14 - 5.80 x10E6/uL    HGB 13.0 13.0 - 17.7 g/dL    HCT 39.4 37.5 - 51.0 %    MCV 90 79 - 97 fL    MCH 29.8 26.6 - 33.0 pg    MCHC 33.0 31.5 - 35.7 g/dL    RDW 14.2 12.3 - 15.4 %    PLATELET 639 940 - 553 J43E5/IS   METABOLIC PANEL, COMPREHENSIVE   Result Value Ref Range    Glucose 103 (H) 65 - 99 mg/dL    BUN 14 8 - 27 mg/dL    Creatinine 0.83 0.76 - 1.27 mg/dL    GFR est non-AA 90 >59 mL/min/1.73    GFR est  >59 mL/min/1.73    BUN/Creatinine ratio 17 10 - 24    Sodium 140 134 - 144 mmol/L    Potassium 4.8 3.5 - 5.2 mmol/L    Chloride 97 96 - 106 mmol/L    CO2 22 18 - 29 mmol/L    Calcium 9.3 8.6 - 10.2 mg/dL    Protein, total 7.8 6.0 - 8.5 g/dL    Albumin 4.2 3.6 - 4.8 g/dL    GLOBULIN, TOTAL 3.6 1.5 - 4.5 g/dL    A-G Ratio 1.2 1.2 - 2.2    Bilirubin, total 0.3 0.0 - 1.2 mg/dL    Alk. phosphatase 89 39 - 117 IU/L    AST (SGOT) 44 (H) 0 - 40 IU/L    ALT (SGPT) 30 0 - 44 IU/L   AMB POC LIPID PROFILE   Result Value Ref Range    Cholesterol (POC) 154     Triglycerides (POC) 104     HDL Cholesterol (POC) 79     Non-HDL Cholesterol 76     LDL Cholesterol (POC) 55 MG/DL    TChol/HDL Ratio (POC) 2.0        Assessment/Plan:    ICD-10-CM ICD-9-CM    1. Acute bronchitis, unspecified organism J20.9 466.0 XR CHEST PA LAT   2. HIV [facility]human immunodeficiency virus infection) ([facility] B20 V08    3. Hypothyroidism due to acquired atrophy of thyroid E03.4 244.8      246.8      Orders Placed This Encounter    XR CHEST PA LAT     Standing Status:   Future     Standing Expiration Date:   2018     Order Specific Question:   Reason for Exam     Answer:   dyspnea,cough     Order Specific Question:   Is Patient Allergic to Contrast Dye? Answer:   No    albuterol (PROVENTIL HFA, VENTOLIN HFA, PROAIR HFA) 90 mcg/actuation inhaler     Sig: Take 2 Puffs by inhalation every four (4) hours as needed for Wheezing. Dispense:  1 Inhaler     Refill:  12    levoFLOXacin (LEVAQUIN) 500 mg tablet     Sig: Take 1 Tab by mouth daily.      Dispense:  10 Tab     Refill:  0    predniSONE (DELTASONE) 10 mg tablet     Si tabs today and reduce by 1 tab daily     Dispense:  21 Tab     Refill:  0     continue present plan,Take 81mg aspirin daily  Patient Instructions   MyChart Activation    Thank you for requesting access to [facility]. Please follow the instructions below to securely access and download your online medical record. [facility] allows you to send messages to your doctor, view your test results, renew your prescriptions, schedule appointments, and more. How Do I Sign Up? 1. In your internet browser, go to [email or web address]  2. Click on the First Time User? Click Here link in the Sign In box. You will be redirect to the New Member Sign Up page. 3. Enter your [facility] Access Code exactly as it appears below. You will not need to use this code after youve completed the sign-up process. If you do not sign up before the expiration date, you must request a new code. [facility] Access Code: Activation code not generated  Current [facility] Status: Patient Declined (This is the date your [facility] access code will )    4. Enter the last four digits of your Social Security Number (xxxx) and Date of Birth (mm/dd/yyyy) as indicated and click Submit. You will be taken to the next sign-up page. 5. Create a [facility] ID. This will be your [facility] login ID and cannot be changed, so think of one that is secure and easy to remember. 6. Create a [facility] password. You can change your password at any time. 7. Enter your Password Reset Question and Answer. This can be used at a later time if you forget your password. 8. Enter your e-mail address. You will receive e-mail notification when new information is available in [address] 9. Click Sign Up. You can now view and download portions of your medical record. 10. Click the Download Summary menu link to download a portable copy of your medical information.     Additional Information    If you have questions, please visit the Frequently Asked Questions section of the [facility] website at [email or web address] Remember, [employer] is NOT to be used for urgent needs. For medical emergencies, dial 911. Follow-up Disposition:  Return in about 4 weeks (around 3/16/2018), or if symptoms worsen or fail to improve. I have reviewed with the patient details of the assessment and plan and all questions were answered. Relevent patient education was performed. The most recent lab findings were reviewed with the patient. An After Visit Summary was printed and given to the patient.

## 2018-03-22 ENCOUNTER — OFFICE VISIT (OUTPATIENT)
Dept: INTERNAL MEDICINE CLINIC | Age: 68
End: 2018-03-22

## 2018-03-22 VITALS
OXYGEN SATURATION: 98 % | TEMPERATURE: 97.8 F | SYSTOLIC BLOOD PRESSURE: 114 MMHG | RESPIRATION RATE: 16 BRPM | BODY MASS INDEX: 22.98 KG/M2 | WEIGHT: 143 LBS | HEIGHT: 66 IN | HEART RATE: 78 BPM | DIASTOLIC BLOOD PRESSURE: 74 MMHG

## 2018-03-22 DIAGNOSIS — J41.0 SIMPLE CHRONIC BRONCHITIS (HCC): Primary | ICD-10-CM

## 2018-03-22 DIAGNOSIS — K59.01 SLOW TRANSIT CONSTIPATION: ICD-10-CM

## 2018-03-22 DIAGNOSIS — R29.6 FREQUENT FALLS: ICD-10-CM

## 2018-03-22 DIAGNOSIS — B20 HIV (HUMAN IMMUNODEFICIENCY VIRUS INFECTION) (HCC): ICD-10-CM

## 2018-03-22 RX ORDER — LUBIPROSTONE 24 UG/1
24 CAPSULE, GELATIN COATED ORAL
Qty: 30 CAP | Refills: 3 | Status: SHIPPED | OUTPATIENT
Start: 2018-03-22 | End: 2018-04-21

## 2018-03-22 RX ORDER — FLUTICASONE PROPIONATE AND SALMETEROL 250; 50 UG/1; UG/1
1 POWDER RESPIRATORY (INHALATION) 2 TIMES DAILY
Qty: 1 INHALER | Refills: 12 | Status: SHIPPED | OUTPATIENT
Start: 2018-03-22 | End: 2018-05-04 | Stop reason: SDUPTHER

## 2018-03-22 RX ORDER — IBUPROFEN 800 MG/1
TABLET ORAL
Qty: 60 TAB | Refills: 12 | Status: SHIPPED | OUTPATIENT
Start: 2018-03-22 | End: 2018-03-27 | Stop reason: SDUPTHER

## 2018-03-22 NOTE — MR AVS SNAPSHOT
[address] [place] [address],[address] [place] [id number] 
[phone] Patient: Ramon Haines MRN: [id number] Visit Information Date & Time Provider Department Dept. Phone Encounter #  
 3/22/2018  2:00 PM Anderson Sargent MD [facility] [phone] [id number] Follow-up Instructions Return in about 3 months (around 6/22/2018), or if symptoms worsen or fail to improve. Upcoming Health Maintenance Date Due  
 GLAUCOMA SCREENING Q2Y 11/22/2014 Pneumococcal 65+ High/Highest Risk (2 of 2 - PPSV23) 10/8/2017 FOBT Q 1 YEAR AGE 50-75 5/16/2018 DTaP/Tdap/Td series (1 - Tdap) 4/26/2018* *Topic was postponed. The date shown is not the original due date. Allergies as of 3/22/2018  Review Complete On: 3/22/2018 By: Anderson Sargent MD  
 No Known Allergies Current Immunizations  Reviewed on 12/5/2013 Name Date Influenza Vaccine 12/5/2013 Influenza Vaccine Split 10/8/2012 TB Skin Test (PPD) Intradermal 6/21/2017 ZZZ-RETIRED (DO NOT USE) Pneumococcal Vaccine (Unspecified Type) 10/8/2012 Not reviewed this visit You Were Diagnosed With   
  
 Codes Comments Simple chronic bronchitis ([facility]    -  Primary ICD-10-CM: J41.0 ICD-9-CM: 491.0 Frequent falls     ICD-10-CM: R29.6 ICD-9-CM: V15.88   
 HIV (human immunodeficiency virus infection) ([facility]     ICD-10-CM: B20 
ICD-9-CM: V08 Slow transit constipation     ICD-10-CM: K59.01 
ICD-9-CM: 564.01 Vitals BP Pulse Temp Resp Height(growth percentile) Weight(growth percentile) 114/74 78 97.8 °F (36.6 °C) (Oral) 16 5' 6\" (1.676 m) 143 lb (64.9 kg) SpO2 BMI Smoking Status 98% 23.08 kg/m2 Current Every Day Smoker BMI and BSA Data Body Mass Index Body Surface Area 23.08 kg/m 2 1.74 m 2 Preferred Pharmacy Pharmacy Name Phone [address] [place] [address] [phone] Your Updated Medication List  
  
   
This list is accurate as of 3/22/18  2:55 PM.  Always use your most recent med list.  
  
  
  
  
 baclofen 10 mg tablet Commonly known as:  LIORESAL Take  by mouth three (3) times daily. cholecalciferol 1,000 unit Cap Commonly known as:  VITAMIN D3 Take  by mouth daily. ergocalciferol 50,000 unit capsule Commonly known as:  ERGOCALCIFEROL Take 1 Cap by mouth every seven (7) days. FLUoxetine 10 mg capsule Commonly known as:  PROzac  
  
 fluPHENAZine decanoate 25 mg/mL injection Commonly known as:  PROLIXIN  
  
 fluticasone-salmeterol 250-50 mcg/dose diskus inhaler Commonly known as:  ADVAIR Take 1 Puff by inhalation two (2) times a day.  
  
 gabapentin 300 mg capsule Commonly known as:  NEURONTIN Take 1 Cap by mouth three (3) times daily. hydroCHLOROthiazide 25 mg tablet Commonly known as:  HYDRODIURIL  
take 1 tablet by mouth once daily  
  
 hydrocortisone 2.5 % rectal cream  
Commonly known as:  PROCTOSOL HC  
insert rectally four times a day as needed  
  
 ibuprofen 800 mg tablet Commonly known as:  MOTRIN  
take 1 tablet by mouth twice a day with food  
  
 lubiPROStone 24 mcg capsule Commonly known as:  Angelina Kurk Take 1 Cap by mouth daily (with breakfast) for 30 days. trihexyphenidyl 5 mg tablet Commonly known as:  ARTANE Take 5 mg by mouth three (3) times daily. Guzman Blackwood Needs evaluation to proper type Prescriptions Printed Refills Walker misc 0 Sig: Needs evaluation to proper type Class: Print Prescriptions Sent to Pharmacy Refills  
 fluticasone-salmeterol (ADVAIR) 250-50 mcg/dose diskus inhaler 12 Sig: Take 1 Puff by inhalation two (2) times a day. Class: Normal  
 Pharmacy: [address] [place] [address] Ph #: [phone] Route: Inhalation  
 lubiPROStone (AMITIZA) 24 mcg capsule 3 Sig: Take 1 Cap by mouth daily (with breakfast) for 30 days. Class: Normal  
 Pharmacy: [address] [place] [address] [facility] #: [phone] Route: Oral  
  
We Performed the Following REFERRAL TO PHYSICAL THERAPY [LDE84 Custom] Follow-up Instructions Return in about 3 months (around 2018), or if symptoms worsen or fail to improve. Referral Information Referral ID Referred By Referred To  
  
 [id number] Ish Silvaqianas [facility] Not Available Visits Status Start Date End Date 1 New Request 3/22/18 3/22/19 If your referral has a status of pending review or denied, additional information will be sent to support the outcome of this decision. Patient Instructions [employer] Activation Thank you for requesting access to [facility]. Please follow the instructions below to securely access and download your online medical record. [facility] allows you to send messages to your doctor, view your test results, renew your prescriptions, schedule appointments, and more. How Do I Sign Up? 1. In your internet browser, go to [email or web address] 
2. Click on the First Time User? Click Here link in the Sign In box. You will be redirect to the New Member Sign Up page. 3. Enter your [facility] Access Code exactly as it appears below. You will not need to use this code after youve completed the sign-up process. If you do not sign up before the expiration date, you must request a new code. [facility] Access Code: Activation code not generated Current [facility] Status: Patient Declined (This is the date your [facility] access code will ) 4. Enter the last four digits of your Social Security Number (xxxx) and Date of Birth (mm/dd/yyyy) as indicated and click Submit. You will be taken to the next sign-up page. 5. Create a [facility] ID.  This will be your [facility] login ID and cannot be changed, so think of one that is secure and easy to remember. 6. Create a [employer] password. You can change your password at any time. 7. Enter your Password Reset Question and Answer. This can be used at a later time if you forget your password. 8. Enter your e-mail address. You will receive e-mail notification when new information is available in [address] 9. Click Sign Up. You can now view and download portions of your medical record. 10. Click the Download Summary menu link to download a portable copy of your medical information. Additional Information If you have questions, please visit the Frequently Asked Questions section of the [employer] website at [email or web address] Remember, [employer] is NOT to be used for urgent needs. For medical emergencies, dial 911. Please provide this summary of care documentation to your next provider. Your primary care clinician is listed as Gerson Orozco. If you have any questions after today's visit, please call [phone].

## 2018-03-22 NOTE — PROGRESS NOTES
Fransisco Bunch is a 75 y.o. male and presents with URI (f/u) and Referral Request (PT)  . Subjective:  He has had frequent falling on his present rolling walker  He has been unsteady with the walker    COPD REVIEW:  The patient is being seen for follow up of COPD,the patient has been unstable,wheezing has been reported  Oxygen: He currently is not on home oxygen therapy. Symptoms: chronic dyspnea is reported   Patient uses 2 pillows at night. Patient does continue to smoke cigarettes. Review of Systems  Constitutional: negative for fevers, chills, anorexia and weight loss  Eyes:   negative for visual disturbance and irritation  ENT:   negative for tinnitus,sore throat,nasal congestion,ear pains. hoarseness  Respiratory:  cough,, dyspnea,wheezing  CV:   negative for chest pain, palpitations, lower extremity edema  GI:   negative for nausea, vomiting, diarrhea, abdominal pain,melena  Endo:               negative for polyuria,polydipsia,polyphagia,heat intolerance  Genitourinary: negative for frequency, dysuria and hematuria  Integument:  negative for rash and pruritus  Hematologic:  negative for easy bruising and gum/nose bleeding  Musculoskel: negative for myalgias, arthralgias, back pain, muscle weakness, joint pain  Neurological:  negative for headaches, dizziness, vertigo, memory problems and gait   Behavl/Psych: negative for feelings of anxiety, depression, mood changes    Past Medical History:   Diagnosis Date    Chronic hepatitis C without mention of hepatic coma 4/7/2011    Degenerative Joint Disease 7/13/2011    Hematochezia 4/7/2011    [facility] 4/7/2011    Hypetension 4/7/2011    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 arm surgery due to abscess    HX OTHER SURGICAL      flank mole removal     Social History     Social History    Marital status: SINGLE     Spouse name: N/A    Number of children: N/A    Years of education: N/A     Social History Main Topics    Smoking status: Current Every Day Smoker     Packs/day: 0.25    Smokeless tobacco: Never Used    Alcohol use No    Drug use: No    Sexual activity: Not Asked     Other Topics Concern    None     Social History Narrative     Family History   Problem Relation Age of Onset    Kidney Disease Mother     Diabetes Mother     No Known Problems Father     Heart Disease Sister     Kidney Disease Sister     Diabetes Brother     Heart Disease Brother     Cancer Brother     Alcohol abuse Brother     Obesity Brother     Other Brother      Drugs     Current Outpatient Prescriptions   Medication Sig Dispense Refill    ibuprofen (MOTRIN) 800 mg tablet take 1 tablet by mouth twice a day with food 60 Tab 12    Walker misc Needs evaluation to proper type 1 Each 0    fluticasone-salmeterol (ADVAIR) 250-50 mcg/dose diskus inhaler Take 1 Puff by inhalation two (2) times a day. 1 Inhaler 12    lubiPROStone (AMITIZA) 24 mcg capsule Take 1 Cap by mouth daily (with breakfast) for 30 days. 30 Cap 3    baclofen (LIORESAL) 10 mg tablet Take  by mouth three (3) times daily.  ergocalciferol (ERGOCALCIFEROL) 50,000 unit capsule Take 1 Cap by mouth every seven (7) days. 12 Cap 3    hydrocortisone (PROCTOSOL HC) 2.5 % rectal cream insert rectally four times a day as needed 28.35 g 12    hydroCHLOROthiazide (HYDRODIURIL) 25 mg tablet take 1 tablet by mouth once daily 30 Tab 12    gabapentin (NEURONTIN) 300 mg capsule Take 1 Cap by mouth three (3) times daily. 90 Cap 12    fluPHENAZine decanoate (PROLIXIN) 25 mg/mL injection       cholecalciferol (VITAMIN D3) 1,000 unit cap Take  by mouth daily.       FLUoxetine (PROZAC) 10 mg capsule   0    trihexyphenidyl (ARTANE) 5 mg tablet Take 5 mg by mouth three (3) times daily. No Known Allergies    Objective:  Visit Vitals    /74    Pulse 78    Temp 97.8 °F (36.6 °C) (Oral)    Resp 16    Ht 5' 6\" (1.676 m)    Wt 143 lb (64.9 kg)    SpO2 98%    BMI 23.08 kg/m2     Physical Exam:   General appearance - alert, well appearing, and in no distress  Mental status - alert, oriented to person, place, and time  EYE-SHANNAN, EOMI, corneas normal, no foreign bodies  ENT-ENT exam normal, no neck nodes or sinus tenderness  Nose - normal and patent, no erythema, discharge or polyps  Mouth - mucous membranes moist, pharynx normal without lesions  Neck - supple, no significant adenopathy   Chest -wheezes,, symmetric air entry   Heart - normal rate, regular rhythm, normal S1, S2, no murmurs, rubs, clicks or gallops   Abdomen - soft, nontender, nondistended, no masses or organomegaly  Lymph- no adenopathy palpable  Ext-peripheral pulses normal, no pedal edema, no clubbing or cyanosis  Skin-Warm and dry.  no hyperpigmentation, vitiligo, or suspicious lesions  Neuro -alert, oriented, normal speech, no focal findings or movement disorder noted  Neck-normal C-spine, no tenderness, full ROM without pain  Feet-no nail deformities or callus formation with good pulses noted      Results for orders placed or performed in visit on 01/24/18   CBC W/O DIFF   Result Value Ref Range    WBC 4.9 3.4 - 10.8 x10E3/uL    RBC 4.36 4.14 - 5.80 x10E6/uL    HGB 13.0 13.0 - 17.7 g/dL    HCT 39.4 37.5 - 51.0 %    MCV 90 79 - 97 fL    MCH 29.8 26.6 - 33.0 pg    MCHC 33.0 31.5 - 35.7 g/dL    RDW 14.2 12.3 - 15.4 %    PLATELET 230 098 - 430 H53H2/WY   METABOLIC PANEL, COMPREHENSIVE   Result Value Ref Range    Glucose 103 (H) 65 - 99 mg/dL    BUN 14 8 - 27 mg/dL    Creatinine 0.83 0.76 - 1.27 mg/dL    GFR est non-AA 90 >59 mL/min/1.73    GFR est  >59 mL/min/1.73    BUN/Creatinine ratio 17 10 - 24    Sodium 140 134 - 144 mmol/L Potassium 4.8 3.5 - 5.2 mmol/L    Chloride 97 96 - 106 mmol/L    CO2 22 18 - 29 mmol/L    Calcium 9.3 8.6 - 10.2 mg/dL    Protein, total 7.8 6.0 - 8.5 g/dL    Albumin 4.2 3.6 - 4.8 g/dL    GLOBULIN, TOTAL 3.6 1.5 - 4.5 g/dL    A-G Ratio 1.2 1.2 - 2.2    Bilirubin, total 0.3 0.0 - 1.2 mg/dL    Alk. phosphatase 89 39 - 117 IU/L    AST (SGOT) 44 (H) 0 - 40 IU/L    ALT (SGPT) 30 0 - 44 IU/L   AMB POC LIPID PROFILE   Result Value Ref Range    Cholesterol (POC) 154     Triglycerides (POC) 104     HDL Cholesterol (POC) 79     Non-HDL Cholesterol 76     LDL Cholesterol (POC) 55 MG/DL    TChol/HDL Ratio (POC) 2.0        Assessment/Plan:    ICD-10-CM ICD-9-CM    1. Simple chronic bronchitis (HCC) J41.0 491.0    2. Frequent falls R29.6 V15.88 REFERRAL TO PHYSICAL THERAPY   3. HIV (human immunodeficiency virus infection) ([facility] [address] B20 V08    4. Slow transit constipation K59.01 564.01      Orders Placed This Encounter    REFERRAL TO PHYSICAL THERAPY     Referral Priority:   Routine     Referral Type:   PT/OT/ST     Referral Reason:   Specialty Services Required     Number of Visits Requested:   1    Walker misc     Sig: Needs evaluation to proper type     Dispense:  1 Each     Refill:  0    fluticasone-salmeterol (ADVAIR) 250-50 mcg/dose diskus inhaler     Sig: Take 1 Puff by inhalation two (2) times a day. Dispense:  1 Inhaler     Refill:  12    lubiPROStone (AMITIZA) 24 mcg capsule     Sig: Take 1 Cap by mouth daily (with breakfast) for 30 days. Dispense:  30 Cap     Refill:  3     lose weight, increase physical activity, follow low fat diet, follow low salt diet,Take 81mg aspirin daily  Patient Instructions   Skinny MomharE-Drive Autos Activation    Thank you for requesting access to [facility]. Please follow the instructions below to securely access and download your online medical record. [facility] allows you to send messages to your doctor, view your test results, renew your prescriptions, schedule appointments, and more.     How Do I Sign Up?    1. In your internet browser, go to [email or web address]  2. Click on the First Time User? Click Here link in the Sign In box. You will be redirect to the New Member Sign Up page. 3. Enter your [employer] Access Code exactly as it appears below. You will not need to use this code after youve completed the sign-up process. If you do not sign up before the expiration date, you must request a new code. MyChart Access Code: Activation code not generated  Current [employer] Status: Patient Declined (This is the date your MyChart access code will )    4. Enter the last four digits of your Social Security Number (xxxx) and Date of Birth (mm/dd/yyyy) as indicated and click Submit. You will be taken to the next sign-up page. 5. Create a Achie[employer]t ID. This will be your [employer] login ID and cannot be changed, so think of one that is secure and easy to remember. 6. Create a [employer] password. You can change your password at any time. 7. Enter your Password Reset Question and Answer. This can be used at a later time if you forget your password. 8. Enter your e-mail address. You will receive e-mail notification when new information is available in [address] 9. Click Sign Up. You can now view and download portions of your medical record. 10. Click the Download Summary menu link to download a portable copy of your medical information. Additional Information    If you have questions, please visit the Frequently Asked Questions section of the [employer] website at [email or web address] Remember, [employer] is NOT to be used for urgent needs. For medical emergencies, dial 911. Follow-up Disposition:  Return in about 3 months (around 2018), or if symptoms worsen or fail to improve. I have reviewed with the patient details of the assessment and plan and all questions were answered. Relevent patient education was performed. The most recent lab findings were reviewed with the patient. An After Visit Summary was printed and given to the patient.

## 2018-03-22 NOTE — PATIENT INSTRUCTIONS
TopTechPhotoharValidas Activation    Thank you for requesting access to [facility]. Please follow the instructions below to securely access and download your online medical record. [facility] allows you to send messages to your doctor, view your test results, renew your prescriptions, schedule appointments, and more. How Do I Sign Up? 1. In your internet browser, go to [email or web address]  2. Click on the First Time User? Click Here link in the Sign In box. You will be redirect to the New Member Sign Up page. 3. Enter your [facility] Access Code exactly as it appears below. You will not need to use this code after youve completed the sign-up process. If you do not sign up before the expiration date, you must request a new code. [facility] Access Code: Activation code not generated  Current [facility] Status: Patient Declined (This is the date your [facility] access code will )    4. Enter the last four digits of your Social Security Number (xxxx) and Date of Birth (mm/dd/yyyy) as indicated and click Submit. You will be taken to the next sign-up page. 5. Create a [facility] ID. This will be your [facility] login ID and cannot be changed, so think of one that is secure and easy to remember. 6. Create a [facility] password. You can change your password at any time. 7. Enter your Password Reset Question and Answer. This can be used at a later time if you forget your password. 8. Enter your e-mail address. You will receive e-mail notification when new information is available in [address] 9. Click Sign Up. You can now view and download portions of your medical record. 10. Click the Download Summary menu link to download a portable copy of your medical information. Additional Information    If you have questions, please visit the Frequently Asked Questions section of the [facility] website at [email or web address] Remember, [facility] is NOT to be used for urgent needs. For medical emergencies, dial 911.

## 2018-03-27 RX ORDER — TRIHEXYPHENIDYL HYDROCHLORIDE 5 MG/1
5 TABLET ORAL 3 TIMES DAILY
Qty: 90 TAB | Refills: 3 | Status: SHIPPED | OUTPATIENT
Start: 2018-03-27

## 2018-03-27 RX ORDER — IBUPROFEN 800 MG/1
TABLET ORAL
Qty: 60 TAB | Refills: 12 | Status: ON HOLD | OUTPATIENT
Start: 2018-03-27 | End: 2020-12-27

## 2018-05-06 RX ORDER — FLUTICASONE PROPIONATE AND SALMETEROL 250; 50 UG/1; UG/1
1 POWDER RESPIRATORY (INHALATION) 2 TIMES DAILY
Qty: 1 INHALER | Refills: 12 | Status: ON HOLD | OUTPATIENT
Start: 2018-05-06 | End: 2020-12-27

## 2018-07-05 ENCOUNTER — OFFICE VISIT (OUTPATIENT)
Dept: INTERNAL MEDICINE CLINIC | Age: 68
End: 2018-07-05

## 2018-07-05 VITALS
HEIGHT: 66 IN | SYSTOLIC BLOOD PRESSURE: 120 MMHG | WEIGHT: 136 LBS | RESPIRATION RATE: 16 BRPM | DIASTOLIC BLOOD PRESSURE: 60 MMHG | TEMPERATURE: 97.7 F | BODY MASS INDEX: 21.86 KG/M2 | OXYGEN SATURATION: 98 % | HEART RATE: 69 BPM

## 2018-07-05 DIAGNOSIS — G99.2 STENOSIS OF CERVICAL SPINE WITH MYELOPATHY (HCC): Primary | ICD-10-CM

## 2018-07-05 DIAGNOSIS — R29.6 FREQUENT FALLS: ICD-10-CM

## 2018-07-05 DIAGNOSIS — M48.02 STENOSIS OF CERVICAL SPINE WITH MYELOPATHY (HCC): Primary | ICD-10-CM

## 2018-07-05 DIAGNOSIS — J42 CHRONIC BRONCHITIS, UNSPECIFIED CHRONIC BRONCHITIS TYPE (HCC): ICD-10-CM

## 2018-07-05 DIAGNOSIS — I67.89 CEREBRAL MICROVASCULAR DISEASE: ICD-10-CM

## 2018-07-05 DIAGNOSIS — I63.50 RIGHT PONTINE STROKE (HCC): ICD-10-CM

## 2018-07-05 DIAGNOSIS — E78.00 HYPERCHOLESTEREMIA: ICD-10-CM

## 2018-07-05 LAB
CHOLEST SERPL-MCNC: 154 MG/DL
HDLC SERPL-MCNC: 70 MG/DL
LDL CHOLESTEROL POC: 28 MG/DL
NON HDL CHOL. (LDL+VLDL), 804568: 84
TCHOL/HDL RATIO (POC): 2.2
TRIGL SERPL-MCNC: 283 MG/DL

## 2018-07-05 NOTE — PATIENT INSTRUCTIONS
Opti-Logic Activation    Thank you for requesting access to [facility]. Please follow the instructions below to securely access and download your online medical record. [employer] allows you to send messages to your doctor, view your test results, renew your prescriptions, schedule appointments, and more. How Do I Sign Up? 1. In your internet browser, go to [email or web address]  2. Click on the First Time User? Click Here link in the Sign In box. You will be redirect to the New Member Sign Up page. 3. Enter your Opt[employer]Log[employer] Access Code exactly as it appears below. You will not need to use this code after youve completed the sign-up process. If you do not sign up before the expiration date, you must request a new code. [employer] Access Code: [email or web address]  Expires: 10/3/2018  3:20 PM (This is the date your [employer] access code will )    4. Enter the last four digits of your Social Security Number (xxxx) and Date of Birth (mm/dd/yyyy) as indicated and click Submit. You will be taken to the next sign-up page. 5. Create a Opti-Logic ID. This will be your [employer] login ID and cannot be changed, so think of one that is secure and easy to remember. 6. Create a Opt[employer]ic password. You can change your password at any time. 7. Enter your Password Reset Question and Answer. This can be used at a later time if you forget your password. 8. Enter your e-mail address. You will receive e-mail notification when new information is available in [address] 9. Click Sign Up. You can now view and download portions of your medical record. 10. Click the Download Summary menu link to download a portable copy of your medical information. Additional Information    If you have questions, please visit the Frequently Asked Questions section of the [employer] website at [email or web address] Remember, [employer] is NOT to be used for urgent needs. For medical emergencies, dial 911.

## 2018-07-05 NOTE — PROGRESS NOTES
Chief Complaint   Patient presents with    Cough     f/u    Fall     1. Have you been to the ER, urgent care clinic since your last visit? Hospitalized since your last visit? No    2. Have you seen or consulted any other health care providers outside of the [address] [place] [address] since your last visit? Include any pap smears or colon screening. No  Abuse Screening Questionnaire 7/5/2018   Do you ever feel afraid of your partner? N   Are you in a relationship with someone who physically or mentally threatens you? N   Is it safe for you to go home? Y     Fall Risk Assessment, last 12 mths 7/5/2018   Able to walk? Yes   Fall in past 12 months? Yes   Fall with injury?  Yes   Number of falls in past 12 months 5   Fall Risk Score 6

## 2018-07-05 NOTE — MR AVS SNAPSHOT
[address] [place] [address] 
[phone] Patient: Nicolette Singh MRN: [id number] EAX:[id number] Visit Information Date & Time Provider Department Dept. Phone Encounter #  
 7/5/2018  2:15 PM Servando Carvalho MD [address] [facility] [phone] [id number] Follow-up Instructions Return in about 3 months (around 10/5/2018). Upcoming Health Maintenance Date Due DTaP/Tdap/Td series (1 - Tdap) 11/22/1970 GLAUCOMA SCREENING Q2Y 11/22/2014 Pneumococcal 65+ High/Highest Risk (2 of 2 - PPSV23) 10/8/2017 FOBT Q 1 YEAR AGE 50-75 5/16/2018 Influenza Age 5 to Adult 8/1/2018 Allergies as of 7/5/2018  Review Complete On: 7/5/2018 By: Sabrina Luna LPN No Known Allergies Current Immunizations  Reviewed on 12/5/2013 Name Date Influenza Vaccine 12/5/2013 Influenza Vaccine Split 10/8/2012 TB Skin Test (PPD) Intradermal 6/21/2017 ZZZ-RETIRED (DO NOT USE) Pneumococcal Vaccine (Unspecified Type) 10/8/2012 Not reviewed this visit You Were Diagnosed With   
  
 Codes Comments Stenosis of cervical spine with myelopathy    -  Primary ICD-10-CM: M47.12 
ICD-9-CM: 721.1 Right pontine stroke [facility])     ICD-10-CM: I63.50 ICD-9-CM: 434.91 Cerebral microvascular disease     ICD-10-CM: I67.9 ICD-9-CM: 437.9 Chronic bronchitis, unspecified chronic bronchitis type ([facility]     ICD-10-CM: F96 ICD-9-CM: 491.9 Frequent falls     ICD-10-CM: R29.6 ICD-9-CM: V15.88 Hypercholesteremia     ICD-10-CM: E78.00 ICD-9-CM: 272.0 Vitals BP Pulse Temp Resp Height(growth percentile) Weight(growth percentile) 120/60 (BP 1 Location: Left arm, BP Patient Position: Sitting) 69 97.7 °F (36.5 °C) (Oral) 16 5' 6\" (1.676 m) 136 lb (61.7 kg) SpO2 BMI Smoking Status 98% 21.95 kg/m2 Current Every Day Smoker BMI and BSA Data Body Mass Index Body Surface Area  
 21.95 kg/m 2 1.7 m 2 Preferred Pharmacy Pharmacy Name Phone [address], [address] [place], [address] [phone] Your Updated Medication List  
  
   
This list is accurate as of 7/5/18  3:35 PM.  Always use your most recent med list.  
  
  
  
  
 cholecalciferol 1,000 unit Cap Commonly known as:  VITAMIN D3 Take  by mouth daily. ergocalciferol 50,000 unit capsule Commonly known as:  ERGOCALCIFEROL Take 1 Cap by mouth every seven (7) days. FLUoxetine 10 mg capsule Commonly known as:  PROzac  
  
 fluPHENAZine decanoate 25 mg/mL injection Commonly known as:  PROLIXIN  
  
 fluticasone-salmeterol 250-50 mcg/dose diskus inhaler Commonly known as:  ADVAIR Take 1 Puff by inhalation two (2) times a day.  
  
 gabapentin 300 mg capsule Commonly known as:  NEURONTIN Take 1 Cap by mouth three (3) times daily. hydroCHLOROthiazide 25 mg tablet Commonly known as:  HYDRODIURIL  
take 1 tablet by mouth once daily  
  
 hydrocortisone 2.5 % rectal cream  
Commonly known as:  PROCTOSOL HC  
insert rectally four times a day as needed  
  
 ibuprofen 800 mg tablet Commonly known as:  MOTRIN  
take 1 tablet by mouth twice a day with food  
  
 trihexyphenidyl 5 mg tablet Commonly known as:  ARTANE Take 1 Tab by mouth three (3) times daily. Marissa Slider Needs evaluation to proper type We Performed the Following AMB POC LIPID PROFILE [16403 CPT(R)] [address] Comments:  
 Physical therapy , O.T. TO EVALUATE AND TREAT Follow-up Instructions Return in about 3 months (around 10/5/2018). Referral Information Referral ID Referred By Referred To  
  
 [id number] [facility]   
   [address]   
   [place], [address] Phone: [phone] Fax: [phone] Visits Status Start Date End Date 1 New Request 18 If your referral has a status of pending review or denied, additional information will be sent to support the outcome of this decision. Patient Instructions MyChart Activation Thank you for requesting access to [facility]. Please follow the instructions below to securely access and download your online medical record. [facility] allows you to send messages to your doctor, view your test results, renew your prescriptions, schedule appointments, and more. How Do I Sign Up? 1. In your internet browser, go to [email or web address] 
2. Click on the First Time User? Click Here link in the Sign In box. You will be redirect to the New Member Sign Up page. 3. Enter your [facility] Access Code exactly as it appears below. You will not need to use this code after youve completed the sign-up process. If you do not sign up before the expiration date, you must request a new code. [facility] Access Code: [email or web address] Expires: 10/3/2018  3:20 PM (This is the date your [facility] access code will ) 4. Enter the last four digits of your Social Security Number (xxxx) and Date of Birth (mm/dd/yyyy) as indicated and click Submit. You will be taken to the next sign-up page. 5. Create a [facility] ID. This will be your [facility] login ID and cannot be changed, so think of one that is secure and easy to remember. 6. Create a [facility] password. You can change your password at any time. 7. Enter your Password Reset Question and Answer. This can be used at a later time if you forget your password. 8. Enter your e-mail address. You will receive e-mail notification when new information is available in [address] 9. Click Sign Up. You can now view and download portions of your medical record. 10. Click the Download Summary menu link to download a portable copy of your medical information. Additional Information If you have questions, please visit the Frequently Asked Questions section of the [employer] website at [email or web address] "[email or web address], [email or web address]."/[email or web address] Remember, [employer] is NOT to be used for urgent needs. For medical emergencies, dial 911. Introducing [facility]! Tara Perez introduces [facility] patient portal. Now you can access parts of your medical record, email your doctor's office, and request medication refills online. 1. In your internet browser, go to [email or web address] "[email or web address][employer] [email or web address]."/[email or web address] 2. Click on the First Time User? Click Here link in the Sign In box. You will see the New Member Sign Up page. 3. Enter your Plerts Access Code exactly as it appears below. You will not need to use this code after youve completed the sign-up process. If you do not sign up before the expiration date, you must request a new code. · [facility] Access Code: [email or web address] Expires: 10/3/2018  3:20 PM 
 
4. Enter the last four digits of your Social Security Number (xxxx) and Date of Birth (mm/dd/yyyy) as indicated and click Submit. You will be taken to the next sign-up page. 5. Create a Plerts ID. This will be your [facility]s login ID and cannot be changed, so think of one that is secure and easy to remember. 6. Create a [employer]s password. You can change your password at any time. 7. Enter your Password Reset Question and Answer. This can be used at a later time if you forget your password. 8. Enter your e-mail address. You will receive e-mail notification when new information is available in [address] 9. Click Sign Up. You can now view and download portions of your medical record. 10. Click the Download Summary menu link to download a portable copy of your medical information. If you have questions, please visit the Frequently Asked Questions section of the [employer]s website. Remember, [employer] is NOT to be used for urgent needs. For medical emergencies, dial 911. Now available from your iPhone and Android! Please provide this summary of care documentation to your next provider. Your primary care clinician is listed as Claude Fabry. If you have any questions after today's visit, please call [phone].

## 2018-07-05 NOTE — PROGRESS NOTES
Edison العراقي is a 75 y.o. male and presents with Cough (f/u) and Fall  . Subjective:  He has had frequent falling on his present rolling walker  He has been unsteady with the walker  He has been receiving physical therapy    COPD REVIEW:  The patient is being seen for follow up of COPD,the patient has been unstable,wheezing has been reported  Oxygen: He currently is not on home oxygen therapy. Symptoms: chronic dyspnea is reported   Patient uses 2 pillows at night. Patient does continue to smoke cigarettes. He has a mental illness history. on medication    Review of Systems  Constitutional: negative for fevers, chills, anorexia and weight loss  Eyes:   negative for visual disturbance and irritation  ENT:   negative for tinnitus,sore throat,nasal congestion,ear pains. hoarseness  Respiratory:  cough,, dyspnea,wheezing  CV:   negative for chest pain, palpitations, lower extremity edema  GI:   negative for nausea, vomiting, diarrhea, abdominal pain,melena  Endo:               negative for polyuria,polydipsia,polyphagia,heat intolerance  Genitourinary: negative for frequency, dysuria and hematuria  Integument:  negative for rash and pruritus  Hematologic:  negative for easy bruising and gum/nose bleeding  Musculoskel: negative for myalgias, arthralgias, back pain, muscle weakness, joint pain  Neurological:  negative for headaches, dizziness, vertigo, memory problems and gait   Behavl/Psych: negative for feelings of anxiety, depression, mood changes    Past Medical History:   Diagnosis Date    Chronic hepatitis C without mention of hepatic coma 4/7/2011    Degenerative Joint Disease 7/13/2011    Hematochezia 4/7/2011    HIV (human immunodeficiency virus infection) ([facility] [address] 4/7/2011    Hypetension 4/7/2011    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 arm surgery due to abscess    HX OTHER SURGICAL      flank mole removal     Social History     Social History    Marital status: SINGLE     Spouse name: N/A    Number of children: N/A    Years of education: N/A     Social History Main Topics    Smoking status: Current Every Day Smoker     Packs/day: 0.25    Smokeless tobacco: Never Used    Alcohol use No    Drug use: No    Sexual activity: Not Asked     Other Topics Concern    None     Social History Narrative     Family History   Problem Relation Age of Onset    Kidney Disease Mother     Diabetes Mother     No Known Problems Father     Heart Disease Sister     Kidney Disease Sister     Diabetes Brother     Heart Disease Brother     Cancer Brother     Alcohol abuse Brother     Obesity Brother     Other Brother      Drugs     Current Outpatient Prescriptions   Medication Sig Dispense Refill    fluticasone-salmeterol (ADVAIR) 250-50 mcg/dose diskus inhaler Take 1 Puff by inhalation two (2) times a day. 1 Inhaler 12    ibuprofen (MOTRIN) 800 mg tablet take 1 tablet by mouth twice a day with food 60 Tab 12    trihexyphenidyl (ARTANE) 5 mg tablet Take 1 Tab by mouth three (3) times daily. 80 Tab 3    Walker misc Needs evaluation to proper type 1 Each 0    hydrocortisone (PROCTOSOL HC) 2.5 % rectal cream insert rectally four times a day as needed 28.35 g 12    hydroCHLOROthiazide (HYDRODIURIL) 25 mg tablet take 1 tablet by mouth once daily 30 Tab 12    gabapentin (NEURONTIN) 300 mg capsule Take 1 Cap by mouth three (3) times daily. 90 Cap 12    fluPHENAZine decanoate (PROLIXIN) 25 mg/mL injection       cholecalciferol (VITAMIN D3) 1,000 unit cap Take  by mouth daily.  FLUoxetine (PROZAC) 10 mg capsule   0    ergocalciferol (ERGOCALCIFEROL) 50,000 unit capsule Take 1 Cap by mouth every seven (7) days.  12 Cap 3     No Known Allergies    Objective:  Visit Vitals    /60 (BP 1 Location: Left arm, BP Patient Position: Sitting)    Pulse 69    Temp 97.7 °F (36.5 °C) (Oral)    Resp 16    Ht 5' 6\" (1.676 m)    Wt 136 lb (61.7 kg)    SpO2 98%    BMI 21.95 kg/m2     Physical Exam:   General appearance - alert, well appearing, and in no distress  Mental status - alert, oriented to person, place, and time  EYE-SHANNAN, EOMI, corneas normal, no foreign bodies  ENT-ENT exam normal, no neck nodes or sinus tenderness  Nose - normal and patent, no erythema, discharge or polyps  Mouth - mucous membranes moist, pharynx normal without lesions  Neck - supple, no significant adenopathy   Chest -wheezes,, symmetric air entry   Heart - normal rate, regular rhythm, normal S1, S2, no murmurs, rubs, clicks or gallops   Abdomen - soft, nontender, nondistended, no masses or organomegaly  Lymph- no adenopathy palpable  Ext-peripheral pulses normal, no pedal edema, no clubbing or cyanosis  Skin-Warm and dry.  no hyperpigmentation, vitiligo, or suspicious lesions  Neuro -alert, oriented, normal speech, no focal findings or movement disorder noted  Neck-normal C-spine, no tenderness, full ROM without pain  Feet-no nail deformities or callus formation with good pulses noted      Results for orders placed or performed in visit on 01/24/18   CBC W/O DIFF   Result Value Ref Range    WBC 4.9 3.4 - 10.8 x10E3/uL    RBC 4.36 4.14 - 5.80 x10E6/uL    HGB 13.0 13.0 - 17.7 g/dL    HCT 39.4 37.5 - 51.0 %    MCV 90 79 - 97 fL    MCH 29.8 26.6 - 33.0 pg    MCHC 33.0 31.5 - 35.7 g/dL    RDW 14.2 12.3 - 15.4 %    PLATELET 111 193 - 703 W44U3/ZB   METABOLIC PANEL, COMPREHENSIVE   Result Value Ref Range    Glucose 103 (H) 65 - 99 mg/dL    BUN 14 8 - 27 mg/dL    Creatinine 0.83 0.76 - 1.27 mg/dL    GFR est non-AA 90 >59 mL/min/1.73    GFR est  >59 mL/min/1.73    BUN/Creatinine ratio 17 10 - 24    Sodium 140 134 - 144 mmol/L    Potassium 4.8 3.5 - 5.2 mmol/L    Chloride 97 96 - 106 mmol/L    CO2 22 18 - 29 mmol/L    Calcium 9.3 8.6 - 10.2 mg/dL    Protein, total 7.8 6.0 - 8.5 g/dL    Albumin 4.2 3.6 - 4.8 g/dL    GLOBULIN, TOTAL 3.6 1.5 - 4.5 g/dL    A-G Ratio 1.2 1.2 - 2.2    Bilirubin, total 0.3 0.0 - 1.2 mg/dL    Alk. phosphatase 89 39 - 117 IU/L    AST (SGOT) 44 (H) 0 - 40 IU/L    ALT (SGPT) 30 0 - 44 IU/L   AMB POC LIPID PROFILE   Result Value Ref Range    Cholesterol (POC) 154     Triglycerides (POC) 104     HDL Cholesterol (POC) 79     Non-HDL Cholesterol 76     LDL Cholesterol (POC) 55 MG/DL    TChol/HDL Ratio (POC) 2.0        Assessment/Plan:    ICD-10-CM ICD-9-CM    1. Stenosis of cervical spine with myelopathy M47.12 721.1    2. Right pontine stroke (HCC) I63.50 434.91    3. Cerebral microvascular disease I67.9 437.9    4. [facility] J42 491.9    5. Frequent falls R29.6 V15.88      No orders of the defined types were placed in this encounter. lose weight, increase physical activity, follow low fat diet, follow low salt diet,Take 81mg aspirin daily  Patient Instructions   [employer] Activation    Thank you for requesting access to [facility]Arch. Please follow the instructions below to securely access and download your online medical record. BrightArch allows you to send messages to your doctor, view your test results, renew your prescriptions, schedule appointments, and more. How Do I Sign Up? 1. In your internet browser, go to [email or web address]  2. Click on the First Time User? Click Here link in the Sign In box. You will be redirect to the New Member Sign Up page. 3. Enter your BrightArch Access Code exactly as it appears below. You will not need to use this code after youve completed the sign-up process. If you do not sign up before the expiration date, you must request a new code. Bright[facility] Access Code: [email or web address]  Expires: 10/3/2018  3:20 PM (This is the date your BrightArch access code will )    4.  Enter the last four digits of your Social Security Number (xxxx) and Date of Birth (mm/dd/yyyy) as indicated and click Submit. You will be taken to the next sign-up page. 5. Create a YUPIQt ID. This will be your intelloCut login ID and cannot be changed, so think of one that is secure and easy to remember. 6. Create a intelloCut password. You can change your password at any time. 7. Enter your Password Reset Question and Answer. This can be used at a later time if you forget your password. 8. Enter your e-mail address. You will receive e-mail notification when new information is available in [address] 9. Click Sign Up. You can now view and download portions of your medical record. 10. Click the Download Summary menu link to download a portable copy of your medical information. Additional Information    If you have questions, please visit the Frequently Asked Questions section of the intelloCut website at [email or web address] Remember, intelloCut is NOT to be used for urgent needs. For medical emergencies, dial 911. Follow-up Disposition:  Return in about 3 months (around 10/5/2018). I have reviewed with the patient details of the assessment and plan and all questions were answered. Relevent patient education was performed. The most recent lab findings were reviewed with the patient. An After Visit Summary was printed and given to the patient.

## 2018-10-10 ENCOUNTER — OFFICE VISIT (OUTPATIENT)
Dept: INTERNAL MEDICINE CLINIC | Age: 68
End: 2018-10-10

## 2018-10-10 VITALS
RESPIRATION RATE: 16 BRPM | WEIGHT: 141 LBS | HEART RATE: 74 BPM | TEMPERATURE: 98.2 F | BODY MASS INDEX: 22.66 KG/M2 | SYSTOLIC BLOOD PRESSURE: 120 MMHG | HEIGHT: 66 IN | OXYGEN SATURATION: 98 % | DIASTOLIC BLOOD PRESSURE: 60 MMHG

## 2018-10-10 DIAGNOSIS — I10 ESSENTIAL HYPERTENSION: Primary | ICD-10-CM

## 2018-10-10 DIAGNOSIS — R26.9 GAIT ABNORMALITY: ICD-10-CM

## 2018-10-10 DIAGNOSIS — K64.3 GRADE IV HEMORRHOIDS: ICD-10-CM

## 2018-10-10 DIAGNOSIS — E78.00 HYPERCHOLESTEREMIA: ICD-10-CM

## 2018-10-10 DIAGNOSIS — B20 HIV (HUMAN IMMUNODEFICIENCY VIRUS INFECTION) (HCC): ICD-10-CM

## 2018-10-10 LAB
CHOLEST SERPL-MCNC: 167 MG/DL
HDLC SERPL-MCNC: 80 MG/DL
LDL CHOLESTEROL POC: 56 MG/DL
NON-HDL CHOLESTEROL, 011976: 87
TCHOL/HDL RATIO (POC): 2.2
TRIGL SERPL-MCNC: 157 MG/DL

## 2018-10-10 RX ORDER — HYDROCORTISONE 25 MG/G
CREAM TOPICAL
Qty: 28.35 G | Refills: 12 | Status: SHIPPED | OUTPATIENT
Start: 2018-10-10 | End: 2021-01-13

## 2018-10-10 NOTE — PROGRESS NOTES
1. Have you been to the ER, urgent care clinic since your last visit? Hospitalized since your last visit?no 2. Have you seen or consulted any other health care providers outside of the [address] [place] [address] since your last visit? Include any pap smears or colon screening. No 
 
PHQ over the last two weeks 7/5/2018 PHQ Not Done Patient Decline Little interest or pleasure in doing things Not at all Feeling down, depressed, irritable, or hopeless Not at all Total Score PHQ 2 0 Chief Complaint Patient presents with  Hypertension  Hand Injury  
  right

## 2018-10-10 NOTE — PROGRESS NOTES
Mikki Munguia is a 75 y.o. male and presents with Hypertension and Hand Injury (right) Ilya Melo Subjective: COPD REVIEW: 
The patient is being seen for follow up of COPD,the patient has been unstable,wheezing has been reported Oxygen: He currently is not on home oxygen therapy. Symptoms: chronic dyspnea is reported Patient uses 2 pillows at night. Patient does continue to smoke cigarettes. Hypertension Review: 
The patient has essential hypertension Diet and Lifestyle: generally follows a  low sodium diet, exercises sporadically Home BP Monitoring: is not measured at home. Pertinent ROS: taking medications as instructed, no medication side effects noted, no TIA's, no chest pain on exertion, no dyspnea on exertion, no swelling of ankles. He has some recurrent hemorrhoids Review of Systems Constitutional: negative for fevers, chills, anorexia and weight loss Eyes:   negative for visual disturbance and irritation ENT:   negative for tinnitus,sore throat,nasal congestion,ear pains. hoarseness Respiratory:  cough,, dyspnea,wheezing CV:   negative for chest pain, palpitations, lower extremity edema GI:   negative for nausea, vomiting, diarrhea, abdominal pain,melena Endo:               negative for polyuria,polydipsia,polyphagia,heat intolerance Genitourinary: negative for frequency, dysuria and hematuria Integument:  negative for rash and pruritus Hematologic:  negative for easy bruising and gum/nose bleeding Musculoskel: negative for myalgias, arthralgias, back pain, muscle weakness, joint pain Neurological:  negative for headaches, dizziness, vertigo, memory problems and gait Behavl/Psych: negative for feelings of anxiety, depression, mood changes Past Medical History:  
Diagnosis Date  Chronic hepatitis C without mention of hepatic coma 4/7/2011  Degenerative Joint Disease 7/13/2011  Hematochezia 4/7/2011  
 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  
Procedure Laterality Date  HX ORTHOPAEDIC    
 right foot surgery  HX ORTHOPAEDIC  11/07/2016 Anterior DISKECTOMY and Fusion  HX OTHER SURGICAL    
 bilat. arm surgery due to abscess  HX OTHER SURGICAL    
 flank mole removal  
 
Social History Social History  Marital status: SINGLE Spouse name: N/A  
 Number of children: N/A  
 Years of education: N/A Social History Main Topics  Smoking status: Current Every Day Smoker Packs/day: 0.25  Smokeless tobacco: Never Used  Alcohol use No  
 Drug use: No  
 Sexual activity: Not Asked Other Topics Concern  None Social History Narrative Family History Problem Relation Age of Onset  Kidney Disease Mother  Diabetes Mother  No Known Problems Father  Heart Disease Sister  Kidney Disease Sister  Diabetes Brother  Heart Disease Brother  Cancer Brother  Alcohol abuse Brother  Obesity Brother  Other Brother Drugs Current Outpatient Prescriptions Medication Sig Dispense Refill  hydrocortisone (PROCTOSOL HC) 2.5 % rectal cream insert rectally four times a day as needed 28.35 g 12  
 fluticasone-salmeterol (ADVAIR) 250-50 mcg/dose diskus inhaler Take 1 Puff by inhalation two (2) times a day. 1 Inhaler 12  
 ibuprofen (MOTRIN) 800 mg tablet take 1 tablet by mouth twice a day with food 60 Tab 12  
 trihexyphenidyl (ARTANE) 5 mg tablet Take 1 Tab by mouth three (3) times daily. 80 Tab 3  
 Walker misc Needs evaluation to proper type 1 Each 0  
 ergocalciferol (ERGOCALCIFEROL) 50,000 unit capsule Take 1 Cap by mouth every seven (7) days.  12 Cap 3  
 hydroCHLOROthiazide (HYDRODIURIL) 25 mg tablet take 1 tablet by mouth once daily 30 Tab 12  
 gabapentin (NEURONTIN) 300 mg capsule Take 1 Cap by mouth three (3) times daily. 90 Cap 12  
 cholecalciferol (VITAMIN D3) 1,000 unit cap Take  by mouth daily.  FLUoxetine (PROZAC) 10 mg capsule   0  
 fluPHENAZine decanoate (PROLIXIN) 25 mg/mL injection No Known Allergies Objective: 
Visit Vitals  /60  Pulse 74  Temp 98.2 °F (36.8 °C) (Oral)  Resp 16  
 Ht 5' 6\" (1.676 m)  Wt 141 lb (64 kg)  SpO2 98%  BMI 22.76 kg/m2 Physical Exam:  
General appearance - alert, well appearing, and in no distress Mental status - alert, oriented to person, place, and time EYE-SHANNAN, EOMI, corneas normal, no foreign bodies ENT-ENT exam normal, no neck nodes or sinus tenderness Nose - normal and patent, no erythema, discharge or polyps Mouth - mucous membranes moist, pharynx normal without lesions Neck - supple, no significant adenopathy Chest -wheezes,, symmetric air entry Heart - normal rate, regular rhythm, normal S1, S2, no murmurs, rubs, clicks or gallops Abdomen - soft, nontender, nondistended, no masses or organomegaly Lymph- no adenopathy palpable Ext-peripheral pulses normal, no pedal edema, no clubbing or cyanosis Skin-Warm and dry. no hyperpigmentation, vitiligo, or suspicious lesions Neuro -alert, oriented, normal speech, no focal findings or movement disorder noted Neck-normal C-spine, no tenderness, full ROM without pain Feet-no nail deformities or callus formation with good pulses noted Results for orders placed or performed in visit on 10/10/18 AMB POC LIPID PROFILE Result Value Ref Range Cholesterol (POC) 167 Triglycerides (POC) 157 HDL Cholesterol (POC) 80 Non-HDL Cholesterol 87 LDL Cholesterol (POC) 56 MG/DL TChol/HDL Ratio (POC) 2.2 Assessment/Plan: ICD-10-CM ICD-9-CM 1. Essential hypertension I10 401.9 AMB POC LIPID PROFILE 2. Grade IV hemorrhoids K64.3 455.0 3. Gait abnormality R26.9 781.2 4. Hypercholesteremia E78.00 272.0 5. HIV (human immunodeficiency virus infection) ([facility] [address] B20 V08 REFERRAL TO INFECTIOUS DISEASE Orders Placed This Encounter  REFERRAL TO INFECTIOUS DISEASE Referral Priority:   Routine Referral Type:   Consultation Referral Reason:   Specialty Services Required Referred to Provider:   Roseanne Charles MD  
  Requested Specialty:   Infectious Diseases Number of Visits Requested:   1  AMB POC LIPID PROFILE  hydrocortisone (PROCTOSOL HC) 2.5 % rectal cream  
  Sig: insert rectally four times a day as needed Dispense:  28.35 g Refill:  12  
 
lose weight, increase physical activity, follow low fat diet, follow low salt diet,Take 81mg aspirin daily Patient Instructions MyChart Activation Thank you for requesting access to [facility]. Please follow the instructions below to securely access and download your online medical record. [facility]it allows you to send messages to your doctor, view your test results, renew your prescriptions, schedule appointments, and more. How Do I Sign Up? 1. In your internet browser, go to [email or web address] 
2. Click on the First Time User? Click Here link in the Sign In box. You will be redirect to the New Member Sign Up page. 3. Enter your Qubit Access Code exactly as it appears below. You will not need to use this code after youve completed the sign-up process. If you do not sign up before the expiration date, you must request a new code. Qubit Access Code: [email or web address] Expires: 2019  2:45 PM (This is the date your Qubit access code will ) 4. Enter the last four digits of your Social Security Number (xxxx) and Date of Birth (mm/dd/yyyy) as indicated and click Submit. You will be taken to the next sign-up page. 5. Create a Qubit ID. This will be your Qubit login ID and cannot be changed, so think of one that is secure and easy to remember. 6. Create a Lentigen password. You can change your password at any time. 7. Enter your Password Reset Question and Answer. This can be used at a later time if you forget your password. 8. Enter your e-mail address. You will receive e-mail notification when new information is available in [address] 9. Click Sign Up. You can now view and download portions of your medical record. 10. Click the Download Summary menu link to download a portable copy of your medical information. Additional Information If you have questions, please visit the Frequently Asked Questions section of the L[employer]igen website at [email or web address] Remember, Lent[employer] is NOT to be used for urgent needs. For medical emergencies, dial 911. Follow-up Disposition: 
Return in about 3 months (around 1/10/2019), or if symptoms worsen or fail to improve. I have reviewed with the patient details of the assessment and plan and all questions were answered. Relevent patient education was performed. The most recent lab findings were reviewed with the patient. An After Visit Summary was printed and given to the patient.

## 2018-10-10 NOTE — MR AVS SNAPSHOT
[address] [place] [id number] 
[phone] Patient: Chandrika Calhoun MRN: [id number] Visit Information Date & Time Provider Department Dept. Phone Encounter #  
 10/10/2018  2:00 PM Stanton Evans MD [address] [facility] [phone] [id number] Follow-up Instructions Return in about 3 months (around 1/10/2019), or if symptoms worsen or fail to improve. Upcoming Health Maintenance Date Due DTaP/Tdap/Td series (1 - Tdap) 11/22/1970 Shingrix Vaccine Age 50> (1 of 2) 11/22/1999 GLAUCOMA SCREENING Q2Y 11/22/2014 Pneumococcal 65+ High/Highest Risk (2 of 2 - PPSV23) 10/8/2017 FOBT Q 1 YEAR AGE 50-75 5/16/2018 Influenza Age 5 to Adult 8/1/2018 Allergies as of 10/10/2018  Review Complete On: 10/10/2018 By: Becky Gunn LPN No Known Allergies Current Immunizations  Reviewed on 12/5/2013 Name Date Influenza Vaccine 12/5/2013 Influenza Vaccine Split 10/8/2012 TB Skin Test (PPD) Intradermal 6/21/2017 ZZZ-RETIRED (DO NOT USE) Pneumococcal Vaccine (Unspecified Type) 10/8/2012 Not reviewed this visit You Were Diagnosed With   
  
 Codes Comments Essential hypertension    -  Primary ICD-10-CM: I10 
ICD-9-CM: 401.9 Grade IV hemorrhoids     ICD-10-CM: B57.0 ICD-9-CM: 455.0 Gait abnormality     ICD-10-CM: R26.9 ICD-9-CM: 912. 2 Hypercholesteremia     ICD-10-CM: E78.00 ICD-9-CM: 272.0 HIV (human immunodeficiency virus infection) ([facility]     ICD-10-CM: B20 
ICD-9-CM: V08 Vitals BP Pulse Temp Resp Height(growth percentile) Weight(growth percentile) 120/60 74 98.2 °F (36.8 °C) (Oral) 16 5' 6\" (1.676 m) 141 lb (64 kg) SpO2 BMI Smoking Status 98% 22.76 kg/m2 Current Every Day Smoker BMI and BSA Data Body Mass Index Body Surface Area  
 22.76 kg/m 2 1.73 m 2 Preferred Pharmacy Pharmacy Name Phone [facility], [address] [facility] 149 3[address] [facility] [phone] Your Updated Medication List  
  
   
This list is accurate as of 10/10/18  2:56 PM.  Always use your most recent med list.  
  
  
  
  
 cholecalciferol 1,000 unit Cap Commonly known as:  VITAMIN D3 Take  by mouth daily. ergocalciferol 50,000 unit capsule Commonly known as:  ERGOCALCIFEROL Take 1 Cap by mouth every seven (7) days. FLUoxetine 10 mg capsule Commonly known as:  PROzac  
  
 fluPHENAZine decanoate 25 mg/mL injection Commonly known as:  PROLIXIN  
  
 fluticasone-salmeterol 250-50 mcg/dose diskus inhaler Commonly known as:  ADVAIR Take 1 Puff by inhalation two (2) times a day.  
  
 gabapentin 300 mg capsule Commonly known as:  NEURONTIN Take 1 Cap by mouth three (3) times daily. hydroCHLOROthiazide 25 mg tablet Commonly known as:  HYDRODIURIL  
take 1 tablet by mouth once daily  
  
 hydrocortisone 2.5 % rectal cream  
Commonly known as:  PROCTOSOL HC  
insert rectally four times a day as needed  
  
 ibuprofen 800 mg tablet Commonly known as:  MOTRIN  
take 1 tablet by mouth twice a day with food  
  
 trihexyphenidyl 5 mg tablet Commonly known as:  ARTANE Take 1 Tab by mouth three (3) times daily. Glendia Bulla Needs evaluation to proper type Prescriptions Sent to Pharmacy Refills  
 hydrocortisone (PROCTOSOL HC) 2.5 % rectal cream 12 Sig: insert rectally four times a day as needed Class: Normal  
 Pharmacy: [facility], [address] [place] [address]  Ph #: [phone] We Performed the Following AMB POC LIPID PROFILE [18365 CPT(R)] REFERRAL TO INFECTIOUS DISEASE [REF37 Custom] Follow-up Instructions Return in about 3 months (around 1/10/2019), or if symptoms worsen or fail to improve. Referral Information Referral ID Referred By Referred To Lyle Yu MD   
   [address], [address] [facility] [address] Phone: [phone] Fax: [phone] Visits Status Start Date End Date 1 New Request 10/10/18 10/10/19 If your referral has a status of pending review or denied, additional information will be sent to support the outcome of this decision. Patient Instructions Kreeda Gameshart Activation Thank you for requesting access to Idylis. Please follow the instructions below to securely access and download your online medical record. Idylis allows you to send messages to your doctor, view your test results, renew your prescriptions, schedule appointments, and more. How Do I Sign Up? 1. In your internet browser, go to [email or web address] 
2. Click on the First Time User? Click Here link in the Sign In box. You will be redirect to the New Member Sign Up page. 3. Enter your Idylis Access Code exactly as it appears below. You will not need to use this code after youve completed the sign-up process. If you do not sign up before the expiration date, you must request a new code. Idylis Access Code: [email or web address] Expires: 2019  2:45 PM (This is the date your Idylis access code will ) 4. Enter the last four digits of your Social Security Number (xxxx) and Date of Birth (mm/dd/yyyy) as indicated and click Submit. You will be taken to the next sign-up page. 5. Create a Idylis ID. This will be your Idylis login ID and cannot be changed, so think of one that is secure and easy to remember. 6. Create a Idylis password. You can change your password at any time. 7. Enter your Password Reset Question and Answer. This can be used at a later time if you forget your password. 8. Enter your e-mail address. You will receive e-mail notification when new information is available in [address] 9. Click Sign Up.  You can now view and download portions of your medical record. 10. Click the Download Summary menu link to download a portable copy of your medical information. Additional Information If you have questions, please visit the Frequently Asked Questions section of the [employer] website at [email or web address] Remember, [employer] is NOT to be used for urgent needs. For medical emergencies, dial 911. Introducing [facility] [place] [facility]! [facility] introduces [employer] patient portal. Now you can access parts of your medical record, email your doctor's office, and request medication refills online. 1. In your internet browser, go to [email or web address] 2. Click on the First Time User? Click Here link in the Sign In box. You will see the New Member Sign Up page. 3. Enter your Sierra [employer] Access Code exactly as it appears below. You will not need to use this code after youve completed the sign-up process. If you do not sign up before the expiration date, you must request a new code. · [employer] Access Code: [email or web address] Expires: 1/8/2019  2:45 PM 
 
4. Enter the last four digits of your Social Security Number (xxxx) and Date of Birth (mm/dd/yyyy) as indicated and click Submit. You will be taken to the next sign-up page. 5. Create a [employer] ID. This will be your [employer] login ID and cannot be changed, so think of one that is secure and easy to remember. 6. Create a [employer] password. You can change your password at any time. 7. Enter your Password Reset Question and Answer. This can be used at a later time if you forget your password. 8. Enter your e-mail address. You will receive e-mail notification when new information is available in [address] 9. Click Sign Up. You can now view and download portions of your medical record. 10. Click the Download Summary menu link to download a portable copy of your medical information.  
 
If you have questions, please visit the Frequently Asked Questions section of the Ideal [employer]. Remember, [employer] is NOT to be used for urgent needs. For medical emergencies, dial 911. Now available from your iPhone and Android! Please provide this summary of care documentation to your next provider. Your primary care clinician is listed as Radha Harden. If you have any questions after today's visit, please call [phone].

## 2018-10-10 NOTE — PATIENT INSTRUCTIONS
Research Journalist Activation Thank you for requesting access to Research Journalist. Please follow the instructions below to securely access and download your online medical record. Research Journalist allows you to send messages to your doctor, view your test results, renew your prescriptions, schedule appointments, and more. How Do I Sign Up? 1. In your internet browser, go to [email or web address] 
2. Click on the First Time User? Click Here link in the Sign In box. You will be redirect to the New Member Sign Up page. 3. Enter your Research Journalist Access Code exactly as it appears below. You will not need to use this code after youve completed the sign-up process. If you do not sign up before the expiration date, you must request a new code. Research Journalist Access Code: [email or web address] Expires: 2019  2:45 PM (This is the date your Research Journalist access code will ) 4. Enter the last four digits of your Social Security Number (xxxx) and Date of Birth (mm/dd/yyyy) as indicated and click Submit. You will be taken to the next sign-up page. 5. Create a Research Journalist ID. This will be your Research Journalist login ID and cannot be changed, so think of one that is secure and easy to remember. 6. Create a Research Journalist password. You can change your password at any time. 7. Enter your Password Reset Question and Answer. This can be used at a later time if you forget your password. 8. Enter your e-mail address. You will receive e-mail notification when new information is available in [address] 9. Click Sign Up. You can now view and download portions of your medical record. 10. Click the Download Summary menu link to download a portable copy of your medical information. Additional Information If you have questions, please visit the Frequently Asked Questions section of the Research Journalist website at [email or web address] Remember, Research Journalist is NOT to be used for urgent needs. For medical emergencies, dial 911.

## 2019-01-09 ENCOUNTER — TELEPHONE (OUTPATIENT)
Dept: FAMILY MEDICINE CLINIC | Age: 69
End: 2019-01-09

## 2019-01-09 DIAGNOSIS — B20 HUMAN IMMUNODEFICIENCY VIRUS (HIV) DISEASE (HCC): Primary | ICD-10-CM

## 2019-01-09 NOTE — TELEPHONE ENCOUNTER
Patient Rep called office about referral for patient. Patient was seen in this office how ever it have been a while since patient was last seen.      Made apmt for patient and patient will come to office to  a copy of labs to have done prior ro apmt on 2/5/19 at 3pm

## 2019-01-10 ENCOUNTER — OFFICE VISIT (OUTPATIENT)
Dept: INTERNAL MEDICINE CLINIC | Age: 69
End: 2019-01-10

## 2019-01-10 VITALS
RESPIRATION RATE: 16 BRPM | HEIGHT: 66 IN | TEMPERATURE: 98 F | OXYGEN SATURATION: 100 % | DIASTOLIC BLOOD PRESSURE: 74 MMHG | SYSTOLIC BLOOD PRESSURE: 136 MMHG | BODY MASS INDEX: 23.14 KG/M2 | HEART RATE: 67 BPM | WEIGHT: 144 LBS

## 2019-01-10 DIAGNOSIS — E78.00 HYPERCHOLESTEREMIA: ICD-10-CM

## 2019-01-10 DIAGNOSIS — R26.9 GAIT DISORDER: ICD-10-CM

## 2019-01-10 DIAGNOSIS — I10 ESSENTIAL HYPERTENSION: Primary | ICD-10-CM

## 2019-01-10 DIAGNOSIS — B20 HIV (HUMAN IMMUNODEFICIENCY VIRUS INFECTION) (HCC): ICD-10-CM

## 2019-01-10 DIAGNOSIS — M12.811 ROTATOR CUFF ARTHROPATHY OF RIGHT SHOULDER: ICD-10-CM

## 2019-01-10 LAB
CHOLEST SERPL-MCNC: 154 MG/DL
HDLC SERPL-MCNC: 85 MG/DL
LDL CHOLESTEROL POC: 38 MG/DL
NON-HDL CHOLESTEROL, 011976: 69
TCHOL/HDL RATIO (POC): 1.8
TRIGL SERPL-MCNC: 155 MG/DL

## 2019-01-10 NOTE — PROGRESS NOTES
1. Have you been to the ER, urgent care clinic since your last visit? Hospitalized since your last visit?no    2. Have you seen or consulted any other health care providers outside of the [address] [place] [address] since your last visit? Include any pap smears or colon screening. No    PHQ over the last two weeks 10/10/2018   PHQ Not Done Patient Decline   Little interest or pleasure in doing things Not at all   Feeling down, depressed, irritable, or hopeless Not at all   Total Score PHQ 2 0     Chief Complaint   Patient presents with    Hypertension     Per Dr. Tierra Guzman.,  verbal order given for needed amb poc labs.

## 2019-01-10 NOTE — PATIENT INSTRUCTIONS
Novitaz Activation    Thank you for requesting access to Novitaz. Please follow the instructions below to securely access and download your online medical record. Novitaz allows you to send messages to your doctor, view your test results, renew your prescriptions, schedule appointments, and more. How Do I Sign Up? 1. In your internet browser, go to [email or web address]  2. Click on the First Time User? Click Here link in the Sign In box. You will be redirect to the New Member Sign Up page. 3. Enter your Novitaz Access Code exactly as it appears below. You will not need to use this code after youve completed the sign-up process. If you do not sign up before the expiration date, you must request a new code. Novitaz Access Code: [email or web address]  Expires: 4/10/2019  2:47 PM (This is the date your Novitaz access code will )    4. Enter the last four digits of your Social Security Number (xxxx) and Date of Birth (mm/dd/yyyy) as indicated and click Submit. You will be taken to the next sign-up page. 5. Create a Novitaz ID. This will be your Novitaz login ID and cannot be changed, so think of one that is secure and easy to remember. 6. Create a Novitaz password. You can change your password at any time. 7. Enter your Password Reset Question and Answer. This can be used at a later time if you forget your password. 8. Enter your e-mail address. You will receive e-mail notification when new information is available in [address] 9. Click Sign Up. You can now view and download portions of your medical record. 10. Click the Download Summary menu link to download a portable copy of your medical information. Additional Information    If you have questions, please visit the Frequently Asked Questions section of the Novitaz website at [email or web address] Remember, Novitaz is NOT to be used for urgent needs. For medical emergencies, dial 911.

## 2019-01-10 NOTE — PROGRESS NOTES
Stella Mcclendon is a 70 y.o. male and presents with Hypertension and HIV  . Subjective:    COPD REVIEW:  The patient is being seen for follow up of COPD,the patient has been unstable,wheezing has been reported  Oxygen: He currently is not on home oxygen therapy. Symptoms: chronic dyspnea is reported   Patient uses 2 pillows at night. Patient does continue to smoke cigarettes. Hypertension Review:  The patient has essential hypertension  Diet and Lifestyle: generally follows a  low sodium diet, exercises sporadically  Home BP Monitoring: is not measured at home. Pertinent ROS: taking medications as instructed, no medication side effects noted, no TIA's, no chest pain on exertion, no dyspnea on exertion, no swelling of ankles. Shoulder Pain Review:  Patient complains of right side shoulder pain. The symptoms began several weeks ago Course of symptoms since onset has been gradually worsening. Pain is described as overall severity = moderate. Symptoms were incited by no known event. Patient denies N/A. Therapy to date includes OTC analgesics: effective. Review of Systems  Constitutional: negative for fevers, chills, anorexia and weight loss  Eyes:   negative for visual disturbance and irritation  ENT:   negative for tinnitus,sore throat,nasal congestion,ear pains. hoarseness  Respiratory:  cough,, dyspnea,wheezing  CV:   negative for chest pain, palpitations, lower extremity edema  GI:   negative for nausea, vomiting, diarrhea, abdominal pain,melena  Endo:               negative for polyuria,polydipsia,polyphagia,heat intolerance  Genitourinary: negative for frequency, dysuria and hematuria  Integument:  negative for rash and pruritus  Hematologic:  negative for easy bruising and gum/nose bleeding  Musculoskel: negative for myalgias, arthralgias, back pain, muscle weakness, joint pain  Neurological:  negative for headaches, dizziness, vertigo, memory problems and gait   Behavl/Psych: negative for feelings of anxiety, depression, mood changes    Past Medical History:   Diagnosis Date    Chronic hepatitis C without mention of hepatic coma 4/7/2011    Degenerative Joint Disease 7/13/2011    Hematochezia 4/7/2011    HIV (human immunodeficiency virus infection) (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 arm surgery due to abscess    HX OTHER SURGICAL      flank mole removal     Social History     Socioeconomic History    Marital status: SINGLE     Spouse name: Not on file    Number of children: Not on file    Years of education: Not on file    Highest education level: Not on file   Tobacco Use    Smoking status: Current Every Day Smoker     Packs/day: 0.25    Smokeless tobacco: Never Used   Substance and Sexual Activity    Alcohol use: No    Drug use: No     Family History   Problem Relation Age of Onset    Kidney Disease Mother     Diabetes Mother     No Known Problems Father     Heart Disease Sister     Kidney Disease Sister     Diabetes Brother     Heart Disease Brother     Cancer Brother     Alcohol abuse Brother     Obesity Brother     Other Brother         Drugs     Current Outpatient Medications   Medication Sig Dispense Refill    hydrocortisone (PROCTOSOL HC) 2.5 % rectal cream insert rectally four times a day as needed 28.35 g 12    fluticasone-salmeterol (ADVAIR) 250-50 mcg/dose diskus inhaler Take 1 Puff by inhalation two (2) times a day. 1 Inhaler 12    ibuprofen (MOTRIN) 800 mg tablet take 1 tablet by mouth twice a day with food 60 Tab 12    trihexyphenidyl (ARTANE) 5 mg tablet Take 1 Tab by mouth three (3) times daily.  80 Tab 3    Walker misc Needs evaluation to proper type 1 Each 0    ergocalciferol (ERGOCALCIFEROL) 50,000 unit capsule Take 1 Cap by mouth every seven (7) days. 12 Cap 3    hydroCHLOROthiazide (HYDRODIURIL) 25 mg tablet take 1 tablet by mouth once daily 30 Tab 12    gabapentin (NEURONTIN) 300 mg capsule Take 1 Cap by mouth three (3) times daily. 90 Cap 12    fluPHENAZine decanoate (PROLIXIN) 25 mg/mL injection       cholecalciferol (VITAMIN D3) 1,000 unit cap Take  by mouth daily.  FLUoxetine (PROZAC) 10 mg capsule   0     No Known Allergies    Objective:  Visit Vitals  /74   Pulse 67   Temp 98 °F (36.7 °C) (Oral)   Resp 16   Ht 5' 6\" (1.676 m)   Wt 144 lb (65.3 kg)   SpO2 100%   BMI 23.24 kg/m²     Physical Exam:   General appearance - alert, well appearing, and in no distress  Mental status - alert, oriented to person, place, and time  EYE-SHANNAN, EOMI, corneas normal, no foreign bodies  ENT-ENT exam normal, no neck nodes or sinus tenderness  Nose - normal and patent, no erythema, discharge or polyps  Mouth - mucous membranes moist, pharynx normal without lesions  Neck - supple, no significant adenopathy   Chest -wheezes,, symmetric air entry   Heart - normal rate, regular rhythm, normal S1, S2, no murmurs, rubs, clicks or gallops   Abdomen - soft, nontender, nondistended, no masses or organomegaly  Lymph- no adenopathy palpable  Ext-peripheral pulses normal, no pedal edema, no clubbing or cyanosis  Skin-Warm and dry.  no hyperpigmentation, vitiligo, or suspicious lesions  Neuro -alert, oriented, normal speech, no focal findings or movement disorder noted  Neck-normal C-spine, no tenderness, full ROM without pain  Feet-no nail deformities or callus formation with good pulses noted  Rt.shoulder-reduced range of motion of rt., subdeltoid tenderness    Results for orders placed or performed in visit on 01/10/19   AMB POC LIPID PROFILE   Result Value Ref Range    Cholesterol (POC) 154     Triglycerides (POC) 155     HDL Cholesterol (POC) 85     Non-HDL Cholesterol 69     LDL Cholesterol (POC) 38 MG/DL    TChol/HDL Ratio (POC) 1.8        Assessment/Plan:    ICD-10-CM ICD-9-CM    1. Essential hypertension I10 401.9 AMB POC LIPID PROFILE   2. Rotator cuff arthropathy of right shoulder M12.811 716.81    3. HIV (human immunodeficiency virus infection) ([facility] B20 V08    4. Hypercholesteremia E78.00 272.0      Orders Placed This Encounter    AMB POC LIPID PROFILE     lose weight, increase physical activity, follow low fat diet, follow low salt diet,Take 81mg aspirin daily  Patient Instructions   MyChart Activation    Thank you for requesting access to Sparkbuy. Please follow the instructions below to securely access and download your online medical record. Sparkbuy allows you to send messages to your doctor, view your test results, renew your prescriptions, schedule appointments, and more. How Do I Sign Up? 1. In your internet browser, go to [email or web address]  2. Click on the First Time User? Click Here link in the Sign In box. You will be redirect to the New Member Sign Up page. 3. Enter your Sparkbuy Access Code exactly as it appears below. You will not need to use this code after youve completed the sign-up process. If you do not sign up before the expiration date, you must request a new code. Sparkbuy Access Code: [email or web address]  Expires: 4/10/2019  2:47 PM (This is the date your Sparkbuy access code will )    4. Enter the last four digits of your Social Security Number (xxxx) and Date of Birth (mm/dd/yyyy) as indicated and click Submit. You will be taken to the next sign-up page. 5. Create a Sparkbuy ID. This will be your Sparkbuy login ID and cannot be changed, so think of one that is secure and easy to remember. 6. Create a Sparkbuy password. You can change your password at any time. 7. Enter your Password Reset Question and Answer. This can be used at a later time if you forget your password. 8. Enter your e-mail address.  You will receive e-mail notification when new information is available in [address] 9. Click Sign Up. You can now view and download portions of your medical record. 10. Click the Download Summary menu link to download a portable copy of your medical information. Additional Information    If you have questions, please visit the Frequently Asked Questions section of the Vestorly website at [email or web address] Remember, Vestorly is NOT to be used for urgent needs. For medical emergencies, dial 911. Follow-up Disposition:  Return in about 3 months (around 4/10/2019). I have reviewed with the patient details of the assessment and plan and all questions were answered. Relevent patient education was performed. The most recent lab findings were reviewed with the patient. An After Visit Summary was printed and given to the patient.

## 2019-02-05 ENCOUNTER — OFFICE VISIT (OUTPATIENT)
Dept: INTERNAL MEDICINE CLINIC | Age: 69
End: 2019-02-05

## 2019-02-05 VITALS
OXYGEN SATURATION: 99 % | SYSTOLIC BLOOD PRESSURE: 114 MMHG | TEMPERATURE: 97.8 F | DIASTOLIC BLOOD PRESSURE: 86 MMHG | RESPIRATION RATE: 16 BRPM | HEIGHT: 66 IN | BODY MASS INDEX: 23.24 KG/M2 | HEART RATE: 67 BPM

## 2019-02-05 DIAGNOSIS — Z20.1 EXPOSURE TO TB: ICD-10-CM

## 2019-02-05 DIAGNOSIS — E78.00 HYPERCHOLESTEREMIA: ICD-10-CM

## 2019-02-05 DIAGNOSIS — B20 HIV (HUMAN IMMUNODEFICIENCY VIRUS INFECTION) (HCC): ICD-10-CM

## 2019-02-05 DIAGNOSIS — I10 ESSENTIAL HYPERTENSION: ICD-10-CM

## 2019-02-05 DIAGNOSIS — M12.811 ROTATOR CUFF ARTHROPATHY OF RIGHT SHOULDER: Primary | ICD-10-CM

## 2019-02-05 RX ORDER — LIDOCAINE HYDROCHLORIDE 20 MG/ML
1 INJECTION, SOLUTION EPIDURAL; INFILTRATION; INTRACAUDAL; PERINEURAL ONCE
Qty: 3 ML | Refills: 0
Start: 2019-02-05 | End: 2019-02-05

## 2019-02-05 RX ORDER — TRIAMCINOLONE ACETONIDE 40 MG/ML
40 INJECTION, SUSPENSION INTRA-ARTICULAR; INTRAMUSCULAR ONCE
Qty: 1 ML | Refills: 0
Start: 2019-02-05 | End: 2019-02-05

## 2019-02-05 NOTE — PROGRESS NOTES
1. Have you been to the ER, urgent care clinic since your last visit? Hospitalized since your last visit? YES/Fall/[facility] 2. Have you seen or consulted any other health care providers outside of the [address] [place] [address] since your last visit? Include any pap smears or colon screening. No 
 
PHQ over the last two weeks 10/10/2018 PHQ Not Done Patient Decline Little interest or pleasure in doing things Not at all Feeling down, depressed, irritable, or hopeless Not at all Total Score PHQ 2 0 Chief Complaint Patient presents with  Shoulder Pain  ED Follow-up  Fall Per Dr. Luis Min.,  verbal order given for needed amb poc labs.

## 2019-02-05 NOTE — PATIENT INSTRUCTIONS
Docitt Activation Thank you for requesting access to Docitt. Please follow the instructions below to securely access and download your online medical record. Docitt allows you to send messages to your doctor, view your test results, renew your prescriptions, schedule appointments, and more. How Do I Sign Up? 1. In your internet browser, go to [email or web address] 
2. Click on the First Time User? Click Here link in the Sign In box. You will be redirect to the New Member Sign Up page. 3. Enter your Docitt Access Code exactly as it appears below. You will not need to use this code after youve completed the sign-up process. If you do not sign up before the expiration date, you must request a new code. Docitt Access Code: [email or web address] Expires: 2019  2:47 PM (This is the date your Docitt access code will ) 4. Enter the last four digits of your Social Security Number (xxxx) and Date of Birth (mm/dd/yyyy) as indicated and click Submit. You will be taken to the next sign-up page. 5. Create a Docitt ID. This will be your Docitt login ID and cannot be changed, so think of one that is secure and easy to remember. 6. Create a Docitt password. You can change your password at any time. 7. Enter your Password Reset Question and Answer. This can be used at a later time if you forget your password. 8. Enter your e-mail address. You will receive e-mail notification when new information is available in [address] 9. Click Sign Up. You can now view and download portions of your medical record. 10. Click the Download Summary menu link to download a portable copy of your medical information. Additional Information If you have questions, please visit the Frequently Asked Questions section of the Docitt website at [email or web address] Remember, Docitt is NOT to be used for urgent needs. For medical emergencies, dial 911.

## 2019-02-05 NOTE — PROGRESS NOTES
Sulma Godinez is a 70 y.o. male and presents with Shoulder Pain; ED Follow-up; and Fall Taiwo Poon Subjective: 
Shoulder Pain Review: 
Patient complaints of right side shoulder pains continue. The symptoms began  weeks ago Course of symptoms since onset has been gradually worsening. Pain is described as overall severity = moderate. Symptoms were incited by no known event. Patient denies N/A. Therapy to date includes OTC analgesics: effective. he feels he needs an injection. COPD REVIEW: 
The patient is being seen for follow up of COPD,the patient has been unstable,wheezing has been reported Oxygen: He currently is not on home oxygen therapy. Symptoms: chronic dyspnea is reported Patient uses 2 pillows at night. Patient does continue to smoke cigarettes. Hypertension Review: 
The patient has essential hypertension Diet and Lifestyle: generally follows a  low sodium diet, exercises sporadically Home BP Monitoring: is not measured at home. Pertinent ROS: taking medications as instructed, no medication side effects noted, no TIA's, no chest pain on exertion, no dyspnea on exertion, no swelling of ankles. Review of Systems Constitutional: negative for fevers, chills, anorexia and weight loss Eyes:   negative for visual disturbance and irritation ENT:   negative for tinnitus,sore throat,nasal congestion,ear pains. hoarseness Respiratory:  cough,, dyspnea,wheezing CV:   negative for chest pain, palpitations, lower extremity edema GI:   negative for nausea, vomiting, diarrhea, abdominal pain,melena Endo:               negative for polyuria,polydipsia,polyphagia,heat intolerance Genitourinary: negative for frequency, dysuria and hematuria Integument:  negative for rash and pruritus Hematologic:  negative for easy bruising and gum/nose bleeding Musculoskel: negative for myalgias, arthralgias, back pain, muscle weakness, joint pain Neurological:  negative for headaches, dizziness, vertigo, memory problems and gait Behavl/Psych: negative for feelings of anxiety, depression, mood changes Past Medical History:  
Diagnosis Date  Chronic hepatitis C without mention of hepatic coma 4/7/2011  Degenerative Joint Disease 7/13/2011  Hematochezia 4/7/2011  
 HIV (human immunodeficiency virus infection) (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  
Procedure Laterality Date  HX ORTHOPAEDIC    
 right foot surgery  HX ORTHOPAEDIC  11/07/2016 Anterior DISKECTOMY and Fusion  HX OTHER SURGICAL    
 bilat. arm surgery due to abscess  HX OTHER SURGICAL    
 flank mole removal  
 
Social History Socioeconomic History  Marital status: SINGLE Spouse name: Not on file  Number of children: Not on file  Years of education: Not on file  Highest education level: Not on file Tobacco Use  Smoking status: Current Every Day Smoker Packs/day: 0.25  Smokeless tobacco: Never Used Substance and Sexual Activity  Alcohol use: No  
 Drug use: No  
 
Family History Problem Relation Age of Onset  Kidney Disease Mother  Diabetes Mother  No Known Problems Father  Heart Disease Sister  Kidney Disease Sister  Diabetes Brother  Heart Disease Brother  Cancer Brother  Alcohol abuse Brother  Obesity Brother  Other Brother Drugs Current Outpatient Medications Medication Sig Dispense Refill  lidocaine, PF, (XYLOCAINE) 20 mg/mL (2 %) injection 1 mL by Intra artICUlar route once for 1 dose. 3 mL 0  
 triamcinolone acetonide (KENALOG) 40 mg/mL injection 1 mL by Intra artICUlar route once for 1 dose. 1 mL 0  
 hydrocortisone (PROCTOSOL HC) 2.5 % rectal cream insert rectally four times a day as needed 28.35 g 12  fluticasone-salmeterol (ADVAIR) 250-50 mcg/dose diskus inhaler Take 1 Puff by inhalation two (2) times a day. 1 Inhaler 12  
 ibuprofen (MOTRIN) 800 mg tablet take 1 tablet by mouth twice a day with food 60 Tab 12  
 trihexyphenidyl (ARTANE) 5 mg tablet Take 1 Tab by mouth three (3) times daily. 80 Tab 3  
 Walker misc Needs evaluation to proper type 1 Each 0  
 ergocalciferol (ERGOCALCIFEROL) 50,000 unit capsule Take 1 Cap by mouth every seven (7) days. 12 Cap 3  
 hydroCHLOROthiazide (HYDRODIURIL) 25 mg tablet take 1 tablet by mouth once daily 30 Tab 12  
 gabapentin (NEURONTIN) 300 mg capsule Take 1 Cap by mouth three (3) times daily. 90 Cap 12  
 fluPHENAZine decanoate (PROLIXIN) 25 mg/mL injection  cholecalciferol (VITAMIN D3) 1,000 unit cap Take  by mouth daily.  FLUoxetine (PROZAC) 10 mg capsule   0 No Known Allergies Objective: 
Visit Vitals /86 Pulse 67 Temp 97.8 °F (36.6 °C) (Oral) Resp 16 Ht 5' 6\" (1.676 m) SpO2 99% BMI 23.24 kg/m² Physical Exam:  
General appearance - alert, well appearing, and in no distress Mental status - alert, oriented to person, place, and time EYE-SHANNAN, EOMI, corneas normal, no foreign bodies ENT-ENT exam normal, no neck nodes or sinus tenderness Nose - normal and patent, no erythema, discharge or polyps Mouth - mucous membranes moist, pharynx normal without lesions Neck - supple, no significant adenopathy Chest -wheezes,, symmetric air entry Heart - normal rate, regular rhythm, normal S1, S2, no murmurs, rubs, clicks or gallops Abdomen - soft, nontender, nondistended, no masses or organomegaly Lymph- no adenopathy palpable Ext-peripheral pulses normal, no pedal edema, no clubbing or cyanosis Skin-Warm and dry. no hyperpigmentation, vitiligo, or suspicious lesions Neuro -alert, oriented, normal speech, no focal findings or movement disorder noted Neck-normal C-spine, no tenderness, full ROM without pain Feet-no nail deformities or callus formation with good pulses noted Rt.shoulder-reduced range of motion of rt., subdeltoid tenderness Results for orders placed or performed in visit on 01/10/19 AMB POC LIPID PROFILE Result Value Ref Range Cholesterol (POC) 154 Triglycerides (POC) 155 HDL Cholesterol (POC) 85 Non-HDL Cholesterol 69 LDL Cholesterol (POC) 38 MG/DL TChol/HDL Ratio (POC) 1.8 Assessment/Plan: ICD-10-CM ICD-9-CM 1. Rotator cuff arthropathy of right shoulder M12.811 716.81 IN THER/PROPH/DIAG INJECTION, SUBCUT/IM  
   LIDOCAINE INJECTION  
   TRIAMCINOLONE ACETONIDE INJ AMB POC US, SONO GUIDE NEEDLE  
2. HIV (human immunodeficiency virus infection) ([facility] [address] B20 V08   
3. Hypercholesteremia E78.00 272.0 4. Essential hypertension I10 401.9 Orders Placed This Encounter  AMB POC US, SONO GUIDE NEEDLE (42487) Order Specific Question:   Reason for Exam  
  Answer:   pain  IN THER/PROPH/DIAG INJECTION, SUBCUT/IM  
 LIDOCAINE INJECTION  
 TRIAMCINOLONE ACETONIDE INJ Order Specific Question:   Charge Quantity? Answer:   4  
 lidocaine, PF, (XYLOCAINE) 20 mg/mL (2 %) injection Si mL by Intra artICUlar route once for 1 dose. Dispense:  3 mL Refill:  0  
 triamcinolone acetonide (KENALOG) 40 mg/mL injection Si mL by Intra artICUlar route once for 1 dose. Dispense:  1 mL Refill:  0  
 
lose weight, increase physical activity, follow low fat diet, follow low salt diet,Take 81mg aspirin daily Indications:  
Symptomatic relief of pain Procedure: After consent was obtained, using sterile technique the right shoulder joint was prepped using alcohol. Local anesthetic used: 1% lidocaine. . The joint was entered bu ultrsound guidance and Kenalog 40 mg was mixed with 1% lidocaine 3 ml  and injected into the joint and the needle withdrawn. The procedure was well tolerated.   The patient is asked to continue to rest the joint for a few more days before resuming regular activities. It may be more painful for the first 1-2 days. Watch for fever, or increased swelling or persistent pain in the joint. Call or return to clinic prn if such symptoms occur or there is failure to improve as anticipated. [facility] PROCEDURE PROGRESS NOTE Chart reviewed for the following: 
 Vinh Pozo MD, have reviewed the History, Physical and updated the Allergic reactions for Sher Pisano TIME OUT performed immediately prior to start of procedure: 
 Vinh Pozo MD, have performed the following reviews on Sher Pisano prior to the start of the procedure: 
         
* Patient was identified by name and date of birth * Agreement on procedure being performed was verified * Risks and Benefits explained to the patient * Procedure site verified and marked as necessary * Patient was positioned for comfort Time: 11:10am 
 
 
Date of procedure: 2/5/2019 Procedure performed by:  Kaley Arora MD 
 
Patient assisted by: nursing attendant How tolerated by patient: tolerated the procedure well with no complications Comments: none Patient Instructions MyChart Activation Thank you for requesting access to SetJam. Please follow the instructions below to securely access and download your online medical record. SetJam allows you to send messages to your doctor, view your test results, renew your prescriptions, schedule appointments, and more. How Do I Sign Up? 1. In your internet browser, go to [email or web address] 
2. Click on the First Time User? Click Here link in the Sign In box. You will be redirect to the New Member Sign Up page. 3. Enter your SetJam Access Code exactly as it appears below. You will not need to use this code after youve completed the sign-up process.  If you do not sign up before the expiration date, you must request a new code. Wickr Access Code: [email or web address] Expires: 2019  2:47 PM (This is the date your Wickr access code will ) 4. Enter the last four digits of your Social Security Number (xxxx) and Date of Birth (mm/dd/yyyy) as indicated and click Submit. You will be taken to the next sign-up page. 5. Create a AGRIMAPSt ID. This will be your Wickr login ID and cannot be changed, so think of one that is secure and easy to remember. 6. Create a Wickr password. You can change your password at any time. 7. Enter your Password Reset Question and Answer. This can be used at a later time if you forget your password. 8. Enter your e-mail address. You will receive e-mail notification when new information is available in [address] 9. Click Sign Up. You can now view and download portions of your medical record. 10. Click the Download Summary menu link to download a portable copy of your medical information. Additional Information If you have questions, please visit the Frequently Asked Questions section of the Wickr website at [email or web address] Remember, Wickr is NOT to be used for urgent needs. For medical emergencies, dial 911. Follow-up Disposition: 
Return in about 3 months (around 2019), or if symptoms worsen or fail to improve. I have reviewed with the patient details of the assessment and plan and all questions were answered. Relevent patient education was performed. The most recent lab findings were reviewed with the patient. An After Visit Summary was printed and given to the patient.

## 2019-02-07 LAB
GAMMA INTERFERON BACKGROUND BLD IA-ACNC: 0.02 IU/ML
M TB IFN-G BLD-IMP: NORMAL
M TB IFN-G CD4+ BCKGRND COR BLD-ACNC: 0.08 IU/ML
MITOGEN IGNF BLD-ACNC: 1.98 IU/ML
QUANTIFERON TB2 AG: 0.05 IU/ML
SERVICE CMNT-IMP: NORMAL

## 2019-04-11 ENCOUNTER — HOSPITAL ENCOUNTER (EMERGENCY)
Age: 69
Discharge: HOME | End: 2019-04-11
Attending: EMERGENCY MEDICINE | Admitting: EMERGENCY MEDICINE
Payer: MEDICAID

## 2019-04-11 ENCOUNTER — APPOINTMENT (OUTPATIENT)
Dept: CT IMAGING | Age: 69
End: 2019-04-11
Attending: EMERGENCY MEDICINE
Payer: MEDICAID

## 2019-04-11 ENCOUNTER — APPOINTMENT (OUTPATIENT)
Dept: MRI IMAGING | Age: 69
End: 2019-04-11
Attending: EMERGENCY MEDICINE
Payer: MEDICAID

## 2019-04-11 VITALS
OXYGEN SATURATION: 94 % | RESPIRATION RATE: 15 BRPM | TEMPERATURE: 98.1 F | HEART RATE: 73 BPM | DIASTOLIC BLOOD PRESSURE: 97 MMHG | SYSTOLIC BLOOD PRESSURE: 181 MMHG

## 2019-04-11 DIAGNOSIS — S09.90XA CLOSED HEAD INJURY, INITIAL ENCOUNTER: Primary | ICD-10-CM

## 2019-04-11 LAB
ALBUMIN SERPL-MCNC: 3.6 G/DL (ref 3.5–5)
ALBUMIN/GLOB SERPL: 0.8 {RATIO} (ref 1.1–2.2)
ALP SERPL-CCNC: 89 U/L (ref 45–117)
ALT SERPL-CCNC: 29 U/L (ref 12–78)
ANION GAP SERPL CALC-SCNC: 4 MMOL/L (ref 5–15)
APTT PPP: 25.6 SEC (ref 22.1–32)
AST SERPL-CCNC: 39 U/L (ref 15–37)
ATRIAL RATE: 63 BPM
BASOPHILS # BLD: 0 K/UL (ref 0–0.1)
BASOPHILS NFR BLD: 1 % (ref 0–1)
BILIRUB SERPL-MCNC: 0.3 MG/DL (ref 0.2–1)
BNP SERPL-MCNC: 291 PG/ML
BUN SERPL-MCNC: 17 MG/DL (ref 6–20)
BUN/CREAT SERPL: 22 (ref 12–20)
CALCIUM SERPL-MCNC: 9.4 MG/DL (ref 8.5–10.1)
CALCULATED P AXIS, ECG09: 74 DEGREES
CALCULATED R AXIS, ECG10: 67 DEGREES
CALCULATED T AXIS, ECG11: 70 DEGREES
CHLORIDE SERPL-SCNC: 105 MMOL/L (ref 97–108)
CO2 SERPL-SCNC: 28 MMOL/L (ref 21–32)
COMMENT, HOLDF: NORMAL
CREAT SERPL-MCNC: 0.77 MG/DL (ref 0.7–1.3)
DIAGNOSIS, 93000: NORMAL
DIFFERENTIAL METHOD BLD: NORMAL
EOSINOPHIL # BLD: 0.1 K/UL (ref 0–0.4)
EOSINOPHIL NFR BLD: 2 % (ref 0–7)
ERYTHROCYTE [DISTWIDTH] IN BLOOD BY AUTOMATED COUNT: 13.9 % (ref 11.5–14.5)
GLOBULIN SER CALC-MCNC: 4.3 G/DL (ref 2–4)
GLUCOSE SERPL-MCNC: 87 MG/DL (ref 65–100)
HCT VFR BLD AUTO: 40.8 % (ref 36.6–50.3)
HGB BLD-MCNC: 12.8 G/DL (ref 12.1–17)
IMM GRANULOCYTES # BLD AUTO: 0 K/UL (ref 0–0.04)
IMM GRANULOCYTES NFR BLD AUTO: 0 % (ref 0–0.5)
INR PPP: 1.1 (ref 0.9–1.1)
LYMPHOCYTES # BLD: 1.8 K/UL (ref 0.8–3.5)
LYMPHOCYTES NFR BLD: 33 % (ref 12–49)
MCH RBC QN AUTO: 30.4 PG (ref 26–34)
MCHC RBC AUTO-ENTMCNC: 31.4 G/DL (ref 30–36.5)
MCV RBC AUTO: 96.9 FL (ref 80–99)
MONOCYTES # BLD: 0.6 K/UL (ref 0–1)
MONOCYTES NFR BLD: 10 % (ref 5–13)
NEUTS SEG # BLD: 2.9 K/UL (ref 1.8–8)
NEUTS SEG NFR BLD: 54 % (ref 32–75)
NRBC # BLD: 0 K/UL (ref 0–0.01)
NRBC BLD-RTO: 0 PER 100 WBC
P-R INTERVAL, ECG05: 132 MS
PLATELET # BLD AUTO: 266 K/UL (ref 150–400)
PMV BLD AUTO: 10.1 FL (ref 8.9–12.9)
POTASSIUM SERPL-SCNC: 4.1 MMOL/L (ref 3.5–5.1)
PROT SERPL-MCNC: 7.9 G/DL (ref 6.4–8.2)
PROTHROMBIN TIME: 10.7 SEC (ref 9–11.1)
Q-T INTERVAL, ECG07: 440 MS
QRS DURATION, ECG06: 76 MS
QTC CALCULATION (BEZET), ECG08: 450 MS
RBC # BLD AUTO: 4.21 M/UL (ref 4.1–5.7)
SAMPLES BEING HELD,HOLD: NORMAL
SODIUM SERPL-SCNC: 137 MMOL/L (ref 136–145)
THERAPEUTIC RANGE,PTTT: NORMAL SECS (ref 58–77)
TROPONIN I SERPL-MCNC: <0.05 NG/ML
VENTRICULAR RATE, ECG03: 63 BPM
WBC # BLD AUTO: 5.3 K/UL (ref 4.1–11.1)

## 2019-04-11 PROCEDURE — 36415 COLL VENOUS BLD VENIPUNCTURE: CPT

## 2019-04-11 PROCEDURE — 85610 PROTHROMBIN TIME: CPT

## 2019-04-11 PROCEDURE — 83880 ASSAY OF NATRIURETIC PEPTIDE: CPT

## 2019-04-11 PROCEDURE — 70551 MRI BRAIN STEM W/O DYE: CPT

## 2019-04-11 PROCEDURE — 72125 CT NECK SPINE W/O DYE: CPT

## 2019-04-11 PROCEDURE — 80053 COMPREHEN METABOLIC PANEL: CPT

## 2019-04-11 PROCEDURE — 70450 CT HEAD/BRAIN W/O DYE: CPT

## 2019-04-11 PROCEDURE — 85025 COMPLETE CBC W/AUTO DIFF WBC: CPT

## 2019-04-11 PROCEDURE — 85730 THROMBOPLASTIN TIME PARTIAL: CPT

## 2019-04-11 PROCEDURE — 99285 EMERGENCY DEPT VISIT HI MDM: CPT

## 2019-04-11 PROCEDURE — 84484 ASSAY OF TROPONIN QUANT: CPT

## 2019-04-11 PROCEDURE — 93005 ELECTROCARDIOGRAM TRACING: CPT

## 2019-04-11 NOTE — DISCHARGE INSTRUCTIONS
Patient Education        Learning About a Closed Head Injury  What is a closed head injury? A closed head injury happens when your head gets hit hard. The strong force of the blow causes your brain to shake in your skull. This movement can cause the brain to bruise, swell, or tear. Sometimes nerves or blood vessels also get damaged. This can cause bleeding in or around the brain. A concussion is a type of closed head injury. What are the symptoms? If you have a mild concussion, you may have a mild headache or feel \"not quite right. \" These symptoms are common. They usually go away over a few days to 4 weeks. But sometimes after a concussion, you feel like you can't function as well as before the injury. And you have new symptoms. This is called postconcussive syndrome. You may:  · Find it harder to solve problems, think, concentrate, or remember. · Have headaches. · Have changes in your sleep patterns, such as not being able to sleep or sleeping all the time. · Have changes in your personality. · Not be interested in your usual activities. · Feel angry or anxious without a clear reason. · Lose your sense of taste or smell. · Be dizzy, lightheaded, or unsteady. It may be hard to stand or walk. How is a closed head injury treated? Any person who may have a concussion needs to see a doctor. Some people have to stay in the hospital to be watched. Others can go home safely. If you go home, follow your doctor's instructions. He or she will tell you if you need someone to watch you closely for the next 24 hours or longer. Rest is the best treatment. Get plenty of sleep at night. And try to rest during the day. · Avoid activities that are physically or mentally demanding. These include housework, exercise, and schoolwork. And don't play video games, send text messages, or use the computer. You may need to change your school or work schedule to be able to avoid these activities.   · Ask your doctor when it's okay to drive, ride a bike, or operate machinery. · Take an over-the-counter pain medicine, such as acetaminophen (Tylenol), ibuprofen (Advil, Motrin), or naproxen (Aleve). Be safe with medicines. Read and follow all instructions on the label. · Check with your doctor before you use any other medicines for pain. · Do not drink alcohol or use illegal drugs. They can slow recovery. They can also increase your risk of getting a second head injury. Follow-up care is a key part of your treatment and safety. Be sure to make and go to all appointments, and call your doctor if you are having problems. It's also a good idea to know your test results and keep a list of the medicines you take. Where can you learn more? Go to http://yamilka-radames.info/. Enter E235 in the search box to learn more about \"Learning About a Closed Head Injury. \"  Current as of: Tiffanie 3, 2018  Content Version: 11.9  © 7032-5413 [employer]. Care instructions adapted under license by [employer] (which disclaims liability or warranty for this information). If you have questions about a medical condition or this instruction, always ask your healthcare professional. [address] any warranty or liability for your use of this information.

## 2019-04-11 NOTE — ED NOTES
Patient given discharge paperwork and instructions by RN and provider. Patient verbalized understanding. No signs of distress at time of discharge. Patient ambulatory out of ER with RN via wheelchair

## 2019-04-11 NOTE — PROGRESS NOTES
Physical Therapy Screening: 
Physical Therapy consult is not indicated at this time. A screening was performed on this patient's chart based on their entrance into the emergency department and potential need for physical therapy identified. The patients chart was reviewed and the patient was interviewed/screened. A physical therapy consult is not indicated at this time based on their prior level of function or current medical status. Please consult physical therapy if any therapy needs arise. Thank you. Met with patient. Patient resides in a group home. He ambulates with a rollator walker though notes a history of falls. He fell asleep while sitting on his rollator walker causing him to have a GLF. The fall occured yesterday. Patient reports he has been ambulatory at his baseline since the fall.

## 2019-04-11 NOTE — ED PROVIDER NOTES
70 y.o. male with past medical history significant for schizophrenia, chronic hepatitis c, hiv, htn, organic brain syndrome, djd, who presents from ems with chief complaint of glf. Pt arrives from psychosocial rehab facility with onset last night of a mechanical glf that occurred as he was trying to pick something up from his wheelchair. Pt incurred head trauma, and has resultant HA as well as neck pain rated 10/10. Other accompanying sx include productive cough. Denies syncope. There are no other acute medical concerns at this time. PCP: Valencia Ambriz MD 
 
Note written by Jill Vera, as dictated by Susi De Luna MD 10:30 AM 
 
The history is provided by the patient and the EMS personnel. No  was used. Past Medical History:  
Diagnosis Date  Chronic hepatitis C without mention of hepatic coma 4/7/2011  Degenerative Joint Disease 7/13/2011  Hematochezia 4/7/2011  
 HIV (human immunodeficiency virus infection) (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  
Procedure Laterality Date  HX ORTHOPAEDIC    
 right foot surgery  HX ORTHOPAEDIC  11/07/2016 Anterior DISKECTOMY and Fusion  HX OTHER SURGICAL    
 bilat. arm surgery due to abscess  HX OTHER SURGICAL    
 flank mole removal  
 
   
Family History:  
Problem Relation Age of Onset  Kidney Disease Mother  Diabetes Mother  No Known Problems Father  Heart Disease Sister  Kidney Disease Sister  Diabetes Brother  Heart Disease Brother  Cancer Brother  Alcohol abuse Brother  Obesity Brother  Other Brother Drugs Social History Socioeconomic History  Marital status: SINGLE Spouse name: Not on file  Number of children: Not on file  Years of education: Not on file  Highest education level: Not on file Occupational History  Not on file Social Needs  Financial resource strain: Not on file  Food insecurity:  
  Worry: Not on file Inability: Not on file  Transportation needs:  
  Medical: Not on file Non-medical: Not on file Tobacco Use  Smoking status: Current Every Day Smoker Packs/day: 0.25  Smokeless tobacco: Never Used Substance and Sexual Activity  Alcohol use: No  
 Drug use: No  
 Sexual activity: Not on file Lifestyle  Physical activity:  
  Days per week: Not on file Minutes per session: Not on file  Stress: Not on file Relationships  Social connections:  
  Talks on phone: Not on file Gets together: Not on file Attends Jew service: Not on file Active member of club or organization: Not on file Attends meetings of clubs or organizations: Not on file Relationship status: Not on file  Intimate partner violence:  
  Fear of current or ex partner: Not on file Emotionally abused: Not on file Physically abused: Not on file Forced sexual activity: Not on file Other Topics Concern  Not on file Social History Narrative  Not on file ALLERGIES: Patient has no known allergies. Review of Systems Respiratory: Positive for cough. Musculoskeletal: Positive for neck pain. Neurological: Positive for headaches. All other systems reviewed and are negative. Vitals:  
 04/11/19 1032 BP: (!) 191/94 Pulse: 63 Resp: 16 Temp: 98.1 °F (36.7 °C) SpO2: 98% Physical Exam  
Constitutional: He is oriented to person, place, and time. He appears ill (chronic). No distress. debilitated HENT:  
Head: Normocephalic and atraumatic. Eyes: Conjunctivae are normal. No scleral icterus. Neck: Neck supple. No tracheal deviation present. Mild c-spine tenderness Cardiovascular: Normal rate, regular rhythm, normal heart sounds and intact distal pulses. Exam reveals no gallop and no friction rub. No murmur heard. Pulmonary/Chest: Effort normal and breath sounds normal. He has no wheezes. He has no rales. Abdominal: Soft. He exhibits no distension. There is no tenderness. There is no rebound and no guarding. Musculoskeletal: He exhibits no edema. Neurological: He is alert and oriented to person, place, and time. He has normal strength. No new focal neurologic deficits.  strength equal. No pronator drift. No aphasia. Skin: Skin is warm and dry. No rash noted. Slight contusion on forehead Psychiatric: He has a normal mood and affect. Nursing note and vitals reviewed. Note written by Nathan Martins, as dictated by Kate Pacheco MD 10:30 AM 
MDM Procedures PROGRESS NOTE: 
12:44 PM 
Pt has a possible subacute infarct seen on CAT scan in the [place]. Pt has no new neurologic deficits that are discernable. Not intervention candidate at this time due to timing. VAN negative. Will consult Tele-neurology. ED EKG interpretation: 
Rhythm: normal sinus rhythm; Rate (approx.): 63; voltage criteria for left ventricular hypertrophy; nonspecific st abnormality Note written by Nathan Martins, as dictated by Kate Pacheco MD 12:51 PM 
  
CONSULT NOTE: 
1:11 PM Kate Pacheco MD spoke with Dr. Oscar Monreal, Consult for Tele-Neurology. Discussed available diagnostic tests and clinical findings. Dr. Oscar Monreal recommends MRI; if negative can d/c home. PROGRESS NOTE: 
3:38 PM 
MRI negative. Will d/c home to pcp f/u.

## 2019-04-11 NOTE — PROGRESS NOTES
Date of previous inpatient admission/ ED visit? OP Endoscopy 13 What brought the patient back to ED? Patient presents to the ED for evaluation for GLF Did patient decline recommended services during last admission/ ED visit (if yes, what)? No 
 
Has patient seen a provider since their last inpatient admission/ED visit (if yes, when)? Yes. PCP is Dr. Sheri Lobato (office visit 19) CM Interventions: 
From previous inpatient admission/ED visit: No previous CM Interventions noted From current inpatient admission/ED visit: Assessment EMR reviewed. History significant for  schizophrenia, chronic hepatitis c, hiv, htn, organic brain syndrome, and DJD. Met w/patient and introduced to role of CM.  states \"not feeling well after head injury. \" Patient s/p fall from rollator.  verbalizes not wanting to go back to Group home on [address] - verified address. Patient endorses being followed by True Rodriguez / [employer] Worker [phone] who is assisting w/ locating another home.  has been at facility for 2 weeks and previous location [facility]. CM asked why left facility - Mr. Russell Vanegas endorses was asked to leave and did not have 30 day notice. Patient denies having living family members. Discussed transportation options patient requesting to call rosey Potter [phone]. This writer inquired about having family - patient verbalizes only having nieces and every one else has . Call placed while CM in room and Benton Sweeney spoke w/ John Auguste and will  in 30 minutes 1640. CM acknowledged to need for patient to stay in communication w/ [employer] Worker regarding housing alternatives. No additional needs verbalized. Care Management Interventions PCP Verified by CM: Yes Last Visit to PCP: 19 Palliative Care Criteria Met (RRAT>21 & CHF Dx)?: No 
Transition of Care Consult (CM Consult): Other(TRST) MyChart Signup: No 
 Discharge Durable Medical Equipment: No 
Health Maintenance Reviewed: Yes Physical Therapy Consult: No 
Occupational Therapy Consult: No 
Speech Therapy Consult: No 
Current Support Network: Adult Group Home Confirm Follow Up Transport: Family Plan discussed with Pt/Family/Caregiver: Yes [employer] Information Provided?: No 
Discharge Location Discharge Placement: Group home

## 2019-04-11 NOTE — ED TRIAGE NOTES
Pt comes in via EMS from adult  due to mechanical fall yesterday. Pt was sitting, leaning over and lost his balance. Denies LOC, but did hid head. Pt reports head pain and neck pain. Denies any other complaints.

## 2019-04-11 NOTE — PROGRESS NOTES
Attempted to assess patient currently off unit in MRI. CM will follow for transitions of care needs.

## 2019-07-08 ENCOUNTER — HOSPITAL ENCOUNTER (EMERGENCY)
Age: 69
Discharge: HOME | End: 2019-07-08
Attending: EMERGENCY MEDICINE
Payer: MEDICAID

## 2019-07-08 ENCOUNTER — APPOINTMENT (OUTPATIENT)
Dept: GENERAL RADIOLOGY | Age: 69
End: 2019-07-08
Attending: EMERGENCY MEDICINE
Payer: MEDICAID

## 2019-07-08 ENCOUNTER — APPOINTMENT (OUTPATIENT)
Dept: CT IMAGING | Age: 69
End: 2019-07-08
Attending: EMERGENCY MEDICINE
Payer: MEDICAID

## 2019-07-08 VITALS
SYSTOLIC BLOOD PRESSURE: 162 MMHG | WEIGHT: 137.79 LBS | OXYGEN SATURATION: 95 % | DIASTOLIC BLOOD PRESSURE: 67 MMHG | BODY MASS INDEX: 22.14 KG/M2 | RESPIRATION RATE: 16 BRPM | HEIGHT: 66 IN | HEART RATE: 57 BPM | TEMPERATURE: 98.3 F

## 2019-07-08 DIAGNOSIS — R29.6 FALLS FREQUENTLY: Primary | ICD-10-CM

## 2019-07-08 DIAGNOSIS — Z86.73 HISTORY OF STROKE: ICD-10-CM

## 2019-07-08 DIAGNOSIS — F14.10 COCAINE ABUSE (HCC): ICD-10-CM

## 2019-07-08 LAB
ALBUMIN SERPL-MCNC: 3.4 G/DL (ref 3.5–5)
ALBUMIN/GLOB SERPL: 0.8 {RATIO} (ref 1.1–2.2)
ALP SERPL-CCNC: 91 U/L (ref 45–117)
ALT SERPL-CCNC: 27 U/L (ref 12–78)
AMPHET UR QL SCN: NEGATIVE
ANION GAP SERPL CALC-SCNC: 7 MMOL/L (ref 5–15)
APPEARANCE UR: CLEAR
AST SERPL-CCNC: 32 U/L (ref 15–37)
ATRIAL RATE: 56 BPM
BACTERIA URNS QL MICRO: NEGATIVE /HPF
BARBITURATES UR QL SCN: NEGATIVE
BASOPHILS # BLD: 0 K/UL (ref 0–0.1)
BASOPHILS NFR BLD: 0 % (ref 0–1)
BENZODIAZ UR QL: NEGATIVE
BILIRUB SERPL-MCNC: 0.4 MG/DL (ref 0.2–1)
BILIRUB UR QL: NEGATIVE
BUN SERPL-MCNC: 11 MG/DL (ref 6–20)
BUN/CREAT SERPL: 17 (ref 12–20)
CALCIUM SERPL-MCNC: 8.9 MG/DL (ref 8.5–10.1)
CALCULATED P AXIS, ECG09: 79 DEGREES
CALCULATED R AXIS, ECG10: 49 DEGREES
CALCULATED T AXIS, ECG11: 43 DEGREES
CANNABINOIDS UR QL SCN: NEGATIVE
CHLORIDE SERPL-SCNC: 104 MMOL/L (ref 97–108)
CO2 SERPL-SCNC: 28 MMOL/L (ref 21–32)
COCAINE UR QL SCN: POSITIVE
COLOR UR: NORMAL
COMMENT, HOLDF: NORMAL
CREAT SERPL-MCNC: 0.66 MG/DL (ref 0.7–1.3)
DIAGNOSIS, 93000: NORMAL
DIFFERENTIAL METHOD BLD: NORMAL
DRUG SCRN COMMENT,DRGCM: ABNORMAL
EOSINOPHIL # BLD: 0.1 K/UL (ref 0–0.4)
EOSINOPHIL NFR BLD: 2 % (ref 0–7)
EPITH CASTS URNS QL MICRO: NORMAL /LPF
ERYTHROCYTE [DISTWIDTH] IN BLOOD BY AUTOMATED COUNT: 13 % (ref 11.5–14.5)
ETHANOL SERPL-MCNC: <10 MG/DL
GLOBULIN SER CALC-MCNC: 4.2 G/DL (ref 2–4)
GLUCOSE SERPL-MCNC: 97 MG/DL (ref 65–100)
GLUCOSE UR STRIP.AUTO-MCNC: NEGATIVE MG/DL
HCT VFR BLD AUTO: 40.3 % (ref 36.6–50.3)
HGB BLD-MCNC: 13 G/DL (ref 12.1–17)
HGB UR QL STRIP: NEGATIVE
HYALINE CASTS URNS QL MICRO: NORMAL /LPF (ref 0–5)
IMM GRANULOCYTES # BLD AUTO: 0 K/UL (ref 0–0.04)
IMM GRANULOCYTES NFR BLD AUTO: 0 % (ref 0–0.5)
KETONES UR QL STRIP.AUTO: NEGATIVE MG/DL
LEUKOCYTE ESTERASE UR QL STRIP.AUTO: NEGATIVE
LYMPHOCYTES # BLD: 2.6 K/UL (ref 0.8–3.5)
LYMPHOCYTES NFR BLD: 46 % (ref 12–49)
MCH RBC QN AUTO: 29.8 PG (ref 26–34)
MCHC RBC AUTO-ENTMCNC: 32.3 G/DL (ref 30–36.5)
MCV RBC AUTO: 92.4 FL (ref 80–99)
METHADONE UR QL: NEGATIVE
MONOCYTES # BLD: 0.6 K/UL (ref 0–1)
MONOCYTES NFR BLD: 10 % (ref 5–13)
NEUTS SEG # BLD: 2.4 K/UL (ref 1.8–8)
NEUTS SEG NFR BLD: 42 % (ref 32–75)
NITRITE UR QL STRIP.AUTO: NEGATIVE
NRBC # BLD: 0 K/UL (ref 0–0.01)
NRBC BLD-RTO: 0 PER 100 WBC
OPIATES UR QL: NEGATIVE
P-R INTERVAL, ECG05: 176 MS
PCP UR QL: NEGATIVE
PH UR STRIP: 7 [PH] (ref 5–8)
PLATELET # BLD AUTO: 268 K/UL (ref 150–400)
PMV BLD AUTO: 10.3 FL (ref 8.9–12.9)
POTASSIUM SERPL-SCNC: 3.9 MMOL/L (ref 3.5–5.1)
PROT SERPL-MCNC: 7.6 G/DL (ref 6.4–8.2)
PROT UR STRIP-MCNC: NEGATIVE MG/DL
Q-T INTERVAL, ECG07: 456 MS
QRS DURATION, ECG06: 80 MS
QTC CALCULATION (BEZET), ECG08: 440 MS
RBC # BLD AUTO: 4.36 M/UL (ref 4.1–5.7)
RBC #/AREA URNS HPF: NORMAL /HPF (ref 0–5)
SAMPLES BEING HELD,HOLD: NORMAL
SODIUM SERPL-SCNC: 139 MMOL/L (ref 136–145)
SP GR UR REFRACTOMETRY: 1.02 (ref 1–1.03)
UR CULT HOLD, URHOLD: NORMAL
UROBILINOGEN UR QL STRIP.AUTO: 1 EU/DL (ref 0.2–1)
VENTRICULAR RATE, ECG03: 56 BPM
WBC # BLD AUTO: 5.6 K/UL (ref 4.1–11.1)
WBC URNS QL MICRO: NORMAL /HPF (ref 0–4)

## 2019-07-08 PROCEDURE — 72125 CT NECK SPINE W/O DYE: CPT

## 2019-07-08 PROCEDURE — 85025 COMPLETE CBC W/AUTO DIFF WBC: CPT

## 2019-07-08 PROCEDURE — 36415 COLL VENOUS BLD VENIPUNCTURE: CPT

## 2019-07-08 PROCEDURE — 70450 CT HEAD/BRAIN W/O DYE: CPT

## 2019-07-08 PROCEDURE — 93005 ELECTROCARDIOGRAM TRACING: CPT

## 2019-07-08 PROCEDURE — 80053 COMPREHEN METABOLIC PANEL: CPT

## 2019-07-08 PROCEDURE — 81001 URINALYSIS AUTO W/SCOPE: CPT

## 2019-07-08 PROCEDURE — 80307 DRUG TEST PRSMV CHEM ANLYZR: CPT

## 2019-07-08 PROCEDURE — 71046 X-RAY EXAM CHEST 2 VIEWS: CPT

## 2019-07-08 PROCEDURE — 99284 EMERGENCY DEPT VISIT MOD MDM: CPT

## 2019-07-08 NOTE — ED NOTES
Change of shift. Care of patient taken over from Dr. Megan Mistry; H&P reviewed, bedside handoff complete.   Awaiting labs  Note written by Asa Hill, as dictated by Aristides Rico MD 2:15 PM

## 2019-07-08 NOTE — ED TRIAGE NOTES
Pt arrives by EMS from adult day care ([facility]). Pt was sitting at table and fell forward and hit is head. Pt was complaining of headache prior to fall. BG 85 per EMS. Pt reports bilateral head pain and neck pain, and right leg pain.  +HIV

## 2019-07-08 NOTE — ED NOTES
Pt wheeled by DONY hensley out of ED with discharge instructions and prescriptions in hand given by Dr. Miguel Reed; pt verbalized understanding of discharge paperwork and time allotted for questions. VSS. Pt alert and oriented.

## 2019-07-08 NOTE — ED PROVIDER NOTES
70 y.o. male with past medical history significant for chronic hep C, HIV, and HTN who presents from [facility] via EMS with chief complaint of headache. Patient went to eat breakfast this morning, was sitting down when he fell forward and hit his forehead on the table. This was witnessed by staff. No LOC, vomiting, or seizure-like activity was reported by EMS. Patient is normally ambulatory with a Rolator. He is not on any anticoagulation. En route, his blood sugar was 85. Patient denies any speech difficulty or changes. There are no other acute medical concerns at this time. Full history, physical exam, and ROS unable to be obtained due to:  Pt is a poor historian. Old Chart Review:  The patient's last ED visit was 4/11/19 after a ground level fall with a closed head injury. Patient was found to have a possible subacute infarct in his delta on his CT scan. Due to this, tele-neurology was consulted, who recommended an MRI. The patient's MRI was negative for acute infarct and the patient was discharged home. Social hx: Current smoker (0.25 packs/day); No EtOH use  PCP: Soraida Junior MD    Pt appears to be at baseline per note review. He has chronic complaints, but nothing new today. EKG relatively unremarkable (slightly bradycardic). Cause of fall seems mechanical and he seems to be chronically unsteady on his feet. I had senior services attempt to evaluate, but he did not want any help. He sent both the  and the physical therapist out of the room without giving them the chance to help him. He says he wants to go home. I have no reason to believe he has pneumonia, though his CXR is abnormal.  He has no cough, no c/o difficulty breathing, no fever, no tachycardia, no leukocytosis, and no significant hypoxia for a smoker. I will have him f/u w/ his doc and I told him to return immediately should he have any difficulty breathing.   I told him we would be happy to reevaluate him should he change his mind and wish for more help. I also mentioned to him how bad smoking and cocaine are for his health. He wasn't interested in hearing it today. Note written by Asa Bob, as dictated by Katty Cerda DO 10:39 AM    The history is provided by the patient, medical records and the EMS personnel. No  was used. 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  arm surgery due to abscess    HX OTHER SURGICAL      flank mole removal         Family History:   Problem Relation Age of Onset    Kidney Disease Mother     Diabetes Mother     No Known Problems Father     Heart Disease Sister     Kidney Disease Sister     Diabetes Brother     Heart Disease Brother     Cancer Brother     Alcohol abuse Brother     Obesity Brother     Other Brother         Drugs       Social History     Socioeconomic History    Marital status: SINGLE     Spouse name: Not on file    Number of children: Not on file    Years of education: Not on file    Highest education level: Not on file   Occupational History    Not on file   Social Needs    Financial resource strain: Not on file    Food insecurity:     Worry: Not on file     Inability: Not on file    Transportation needs:     Medical: Not on file     Non-medical: Not on file   Tobacco Use    Smoking status: Current Every Day Smoker     Packs/day: 0.25    Smokeless tobacco: Never Used   Substance and Sexual Activity    Alcohol use: No    Drug use: No    Sexual activity: Not on file   Lifestyle    Physical activity:     Days per week: Not on file     Minutes per session: Not on file    Stress: Not on file   Relationships    Social connections:     Talks on phone: Not on file     Gets together: Not on file     Attends Oriental orthodox service: Not on file     Active member of club or organization: Not on file     Attends meetings of clubs or organizations: Not on file     Relationship status: Not on file    Intimate partner violence:     Fear of current or ex partner: Not on file     Emotionally abused: Not on file     Physically abused: Not on file     Forced sexual activity: Not on file   Other Topics Concern    Not on file   Social History Narrative    Not on file         ALLERGIES: Patient has no known allergies. Review of Systems   Unable to perform ROS: Other (Poor historian)       Vitals:    07/08/19 0948   BP: 162/84   Pulse: 60   Resp: 16   Temp: 98.5 °F (36.9 °C)   SpO2: 96%   Weight: 62.5 kg (137 lb 12.6 oz)   Height: 5' 6\" (1.676 m)            Physical Exam   Constitutional: He appears well-developed and well-nourished. HENT:   Head: Normocephalic and atraumatic. Edentulous. Eyes: Pupils are equal, round, and reactive to light. Conjunctivae and EOM are normal.   Neck: Normal range of motion. Cardiovascular: Normal rate and intact distal pulses. Pulmonary/Chest: Effort normal and breath sounds normal. No respiratory distress. Abdominal: Soft. Bowel sounds are normal. There is no tenderness. There is no rebound and no guarding. Musculoskeletal: Normal range of motion. Neurological: He is alert.    Able to hear bilaterally  SILT to both sides of forehead, cheeks, and jaw  Uvula midline, soft palate raises  Vision grossly intact  No facial asymmetry   Able to shrug shoulders and turn head from side to side  Able to stick tongue out and move side to side  No pronator drift  SILT median, radial, ulnar nerves  5/5  strength bilaterally and flexion/extension at the elbows  5/5 flexion/extension both hips, knees, and dorsi/plantar flexion  SILT both lower extremities  Somewhat frail and requires assistance to stand up   Skin: Skin is warm and dry. Psychiatric: His behavior is normal.   Nursing note and vitals reviewed. Note written by Asa Malik, as dictated by Matthias Saint, DO 10:39 AM    MDM       Procedures      ED EKG interpretation:  Rhythm: sinus bradycardia; and regular . Rate (approx.): 56 bpm; Normal intervals; Normal axis; No ST changes.      Note written by Asa Malik, as dictated by Matthias Saint, DO 1:23 PM

## 2019-07-08 NOTE — DISCHARGE INSTRUCTIONS
Patient Education        Preventing Falls: Care Instructions  Your Care Instructions    Getting around your home safely can be a challenge if you have injuries or health problems that make it easy for you to fall. Loose rugs and furniture in walkways are among the dangers for many older people who have problems walking or who have poor eyesight. People who have conditions such as arthritis, osteoporosis, or dementia also have to be careful not to fall. You can make your home safer with a few simple measures. Follow-up care is a key part of your treatment and safety. Be sure to make and go to all appointments, and call your doctor if you are having problems. It's also a good idea to know your test results and keep a list of the medicines you take. How can you care for yourself at home? Taking care of yourself  · You may get dizzy if you do not drink enough water. To prevent dehydration, drink plenty of fluids, enough so that your urine is light yellow or clear like water. Choose water and other caffeine-free clear liquids. If you have kidney, heart, or liver disease and have to limit fluids, talk with your doctor before you increase the amount of fluids you drink. · Exercise regularly to improve your strength, muscle tone, and balance. Walk if you can. Swimming may be a good choice if you cannot walk easily. · Have your vision and hearing checked each year or any time you notice a change. If you have trouble seeing and hearing, you might not be able to avoid objects and could lose your balance. · Know the side effects of the medicines you take. Ask your doctor or pharmacist whether the medicines you take can affect your balance. Sleeping pills or sedatives can affect your balance. · Limit the amount of alcohol you drink. Alcohol can impair your balance and other senses. · Ask your doctor whether calluses or corns on your feet need to be removed.  If you wear loose-fitting shoes because of calluses or corns, you can lose your balance and fall. · Talk to your doctor if you have numbness in your feet. Preventing falls at home  · Remove raised doorway thresholds, throw rugs, and clutter. Repair loose carpet or raised areas in the floor. · Move furniture and electrical cords to keep them out of walking paths. · Use nonskid floor wax, and wipe up spills right away, especially on ceramic tile floors. · If you use a walker or cane, put rubber tips on it. If you use crutches, clean the bottoms of them regularly with an abrasive pad, such as steel wool. · Keep your house well lit, especially [employer], and outside walkways. Use night-lights in areas such as hallways and bathrooms. Add extra light switches or use remote switches (such as switches that go on or off when you clap your hands) to make it easier to turn lights on if you have to get up during the night. · Install sturdy handrails on stairways. · Move items in your cabinets so that the things you use a lot are on the lower shelves (about waist level). · Keep a cordless phone and a flashlight with new batteries by your bed. If possible, put a phone in each of the main rooms of your house, or carry a cell phone in case you fall and cannot reach a phone. Or, you can wear a device around your neck or wrist. You push a button that sends a signal for help. · Wear low-heeled shoes that fit well and give your feet good support. Use footwear with nonskid soles. Check the heels and soles of your shoes for wear. Repair or replace worn heels or soles. · Do not wear socks without shoes on wood floors. · Walk on the grass when the sidewalks are slippery. If you live in an area that gets snow and ice in the winter, sprinkle salt on slippery steps and sidewalks. Preventing falls in the bath  · Install grab bars and nonskid mats inside and outside your shower or tub and near the toilet and sinks. · Use shower chairs and bath benches.   · Use a hand-held shower head that will allow you to sit while showering. · Get into a tub or shower by putting the weaker leg in first. Get out of a tub or shower with your strong side first.  · Repair loose toilet seats and consider installing a raised toilet seat to make getting on and off the toilet easier. · Keep your bathroom door unlocked while you are in the shower. Where can you learn more? Go to http://yamilka-radames.info/. Enter 0476 [id number] 69 [id number] in the search box to learn more about \"Preventing Falls: Care Instructions. \"  Current as of: March 16, 2018  Content Version: 11.8  © 9256-1274 [employer]. Care instructions adapted under license by [employer] (which disclaims liability or warranty for this information). If you have questions about a medical condition or this instruction, always ask your healthcare professional. [place] [address] any warranty or liability for your use of this information. Patient Education        Cocaine Misuse: Care Instructions  Your Care Instructions    Using cocaine can cause physical and mental harm. It can increase your heart rate and blood pressure, which can lead to a heart attack and even death. It can raise your body temperature. You may have nausea, vomiting, and chills. If you smoke cocaine, the fumes can cause breathing problems. If you snort cocaine, it can damage your nasal passages. If you inject cocaine, it can cause an abscess at the injection site or an infection throughout your body. You may become shaky and restless. You also may see or hear things that are not there (hallucinations), or believe things that are not true. When the doctor treated you, he or she may have:  · Watched your symptoms or done tests to find out how much cocaine was in your body. · Treated you to control your heart rate, temperature, and blood pressure. · Given you oxygen to help you breathe.   · Given you medicine to settle your thoughts and help keep you calm. The doctor has checked you carefully, but problems can develop later. If you notice any problems or new symptoms, get medical treatment right away. How can you care for yourself at home? · When you use cocaine regularly, your body and brain get used to it. This is called dependency. If you are dependent on this drug, you may have withdrawal symptoms when you stop using it. You may feel drowsy, have vivid dreams, or feel hungry, tired, or depressed. You may also feel confused and have trouble thinking clearly. To help get past these symptoms:  ? Get plenty of rest.  ? Drink lots of fluids. ? Stay active, but don't tire yourself. ? Eat a healthy diet. · Talk to your doctor about drug counseling programs that can help you stop using cocaine. · When you stop using cocaine, you may develop abstinence syndrome, which can last for months. Symptoms of this may include feeling depressed, being tired, having trouble concentrating, and craving cocaine. When should you call for help? Call 911 anytime you think you may need emergency care. For example, call if:    · You have symptoms of a heart attack. These may include:  ? Chest pain or pressure, or a strange feeling in the chest.  ? Sweating. ? Shortness of breath. ? Nausea or vomiting. ? Pain, pressure, or a strange feeling in the back, neck, jaw, or upper belly or in one or both shoulders or arms. ? A fast or irregular heartbeat. After you call 911, the  may tell you to chew 1 adult-strength or 2 to 4 low-dose aspirin. Wait for an ambulance. Do not try to drive yourself.     · You feel you cannot stop from hurting yourself or someone else.   [facility] your doctor now or seek immediate medical care if:    · You have severe side effects from using cocaine. These may include problems with thinking, such as seeing things that aren't there or thinking that someone is trying to harm you (paranoia).     · You have new or worse withdrawal symptoms.  Watch closely for changes in your health, and be sure to contact your doctor if:    · You need more help or support to stop. Where can you learn more? Go to http://yamilka-radames.info/. Enter O015 in the search box to learn more about \"Cocaine Misuse: Care Instructions. \"  Current as of: May 7, 2018  Content Version: 11.9  © 2019-6958 [employer]. Care instructions adapted under license by [employer] (which disclaims liability or warranty for this information). If you have questions about a medical condition or this instruction, always ask your healthcare professional. [place] [address] any warranty or liability for your use of this information.

## 2019-07-08 NOTE — PROGRESS NOTES
Physical Therapy Screening:    A screening was performed on this patient's chart based on their entrance into the emergency department and potential need for physical therapy identified based on TRST 3 and fall history. The patients chart was reviewed and the patient interviewed/screened and found to be appropriate for a skilled therapy evaluation. The patient lives in what sounds like a group home for men. He ambulates with a rollator walker though has a history of falls with this. Patient notes having owned a two wheel rolling walker which he left at his previous residence 6 months ago and feels the rollator walker he is currently using is contributing to his falls. States he did better with the two wheel rolling walker. Patient was seen here in the ED 4/11/19 after a fall from a seated position on his rollator walker and discharged home. He endorses being at [facility] two days ago due to a fall and having had multiple falls today. He has had therapy in the past though uncertain how long ago. He also acknowledges having a history of missing appointments (doctors, therapy, etc) and was released by his previous counselor for non compliance. The patient may benefit from a physical therapy assessment in the ED when medically stable to assess if appropriate for a two wheel rolling walker vs the rollator. Please consult for physical therapy if you would like an evaluation to be completed. Thank you.

## 2019-07-08 NOTE — PROGRESS NOTES
Date of previous inpatient admission/ ED visit? 4/11/19 ED  Visit     What brought the patient back to ED? Patient presents to the ED chief compliant of headache  Did patient decline recommended services during last admission/ ED visit (if yes, what)? No     Has patient seen a provider since their last inpatient admission/ED visit (if yes, when)? UNK    CM Interventions:  From previous inpatient admission/ED visit: Assessment on 4/11/19 patient endorsed living at home on [facility] and was followed by [facility] Worker Rusty Miguel who was assisting w/ relocating to another group home. Previous location [facility] (was asked to leave facility).  denied having family members however asked CM to contact Cheli currie for ride home . Patient picked up by niece and discussed importance of f/u w/ [facility] Worker for new residence. From current inpatient admission/ED visit: Assessment    SSED/CM consult received and appreciated. EMR reviewed. Noted patient sleeping aroused after multiple greetings. CM asked if could turn light on - patient agreed.  states having pain including neck. Patient confirms location of home [address] [place] [address]. I'm a newbie only there 3-4 months others 10-15 years. CM asked about day program attending - noted patient eyes closed then opened and speech not clear. This writer asked patient to restate responses  Patient states \" did you go to school and you should stop lying you cant help me with anything and can get out. \"     This writer and patient unable to partner for transitional care planning. Updates provided to Dr. Collin Bills. Case Management Department available for re-consultation if needed. [facility] Medicaid is insurance provider. Care Management Interventions  PCP Verified by CM:  Yes  Last Visit to PCP: 02/05/19  Palliative Care Criteria Met (RRAT>21 & CHF Dx)?: No  Transition of Care Consult (CM Consult): Discharge Planning  MyChart Signup: No  Discharge Durable Medical Equipment: No  Health Maintenance Reviewed: Yes  Physical Therapy Consult: Yes  Occupational Therapy Consult: No  Current Support Network: Adult Group Home  Confirm Follow Up Transport: (TBD)  Plan discussed with Pt/Family/Caregiver: Yes  [place] Resource Information Provided?: No  Discharge Location  Discharge Placement: (TBD)     During visit patient states will not return back to current Group Home.

## 2019-07-08 NOTE — PROGRESS NOTES
Call placed to the [address] spoke w/ Med Tech Ebony Hanks verified location [address]. Informed of d/c today and no medication changes noted. CM will assist w/ transport back to facility. 0 Electronic referral placed via [employer] for [facility] requesting 1600 transport as safest mode (history of AMS and falls).

## 2019-07-08 NOTE — PROGRESS NOTES
Senior services physical therapy consult received and appreciated. Arrived to patient's room with care management seeing patient. Observed patient being rude to care management then told her to leave. Care management left at patient's request.  Explained the nature of this physical therapy visit (to assess gait with a rolling walker and to assist in obtaining one for home if appropriate). Patient refused therapy intervention and offer to assist in obtaining a rolling walker.   Therapy evaluation deferred at patient's request.

## 2019-07-10 ENCOUNTER — APPOINTMENT (OUTPATIENT)
Dept: CT IMAGING | Age: 69
End: 2019-07-10
Attending: PHYSICIAN ASSISTANT
Payer: MEDICAID

## 2019-07-10 ENCOUNTER — APPOINTMENT (OUTPATIENT)
Dept: CT IMAGING | Age: 69
End: 2019-07-10
Attending: EMERGENCY MEDICINE
Payer: MEDICAID

## 2019-07-10 ENCOUNTER — HOSPITAL ENCOUNTER (EMERGENCY)
Age: 69
Discharge: HOME | End: 2019-07-10
Attending: EMERGENCY MEDICINE
Payer: MEDICAID

## 2019-07-10 VITALS
RESPIRATION RATE: 14 BRPM | SYSTOLIC BLOOD PRESSURE: 129 MMHG | OXYGEN SATURATION: 99 % | DIASTOLIC BLOOD PRESSURE: 82 MMHG | HEART RATE: 60 BPM | TEMPERATURE: 98 F

## 2019-07-10 DIAGNOSIS — S16.1XXA STRAIN OF NECK MUSCLE, INITIAL ENCOUNTER: ICD-10-CM
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
## 2019-07-10 DIAGNOSIS — S09.90XA CLOSED HEAD INJURY, INITIAL ENCOUNTER: ICD-10-CM

## 2019-07-10 PROCEDURE — 70450 CT HEAD/BRAIN W/O DYE: CPT

## 2019-07-10 PROCEDURE — 72125 CT NECK SPINE W/O DYE: CPT

## 2019-07-10 PROCEDURE — 99284 EMERGENCY DEPT VISIT MOD MDM: CPT

## 2019-07-10 NOTE — ED TRIAGE NOTES
Pt to ED for c/o fall while at rehab facility. Pt states that another person opened a door, causing him to fall back and strike the top of his head. Reports 8/10 sharp pain.

## 2019-07-10 NOTE — ED TRIAGE NOTES
Pt presents to ED from EMS after having a GLF at his rehab home. Pt was hit by a door in the head and fell however he did not strike his head when he fell.  Pt has complaints of a headache and EMS reports \"wants his head checked\"

## 2019-07-10 NOTE — DISCHARGE INSTRUCTIONS
Continue your routine medications after discharge from the Emergency Department      Patient Education        Neck Strain: Care Instructions  Your Care Instructions    You have strained the muscles and ligaments in your neck. A sudden, awkward movement can strain the neck. This often occurs with falls or car accidents or during certain sports. Everyday activities like working on a computer or sleeping can also cause neck strain if they force you to hold your neck in an awkward position for a long time. It is common for neck pain to get worse for a day or two after an injury, but it should start to feel better after that. You may have more pain and stiffness for several days before it gets better. This is expected. It may take a few weeks or longer for it to heal completely. Good home treatment can help you get better faster and avoid future neck problems. Follow-up care is a key part of your treatment and safety. Be sure to make and go to all appointments, and call your doctor if you are having problems. It's also a good idea to know your test results and keep a list of the medicines you take. How can you care for yourself at home? · If you were given a neck brace (cervical collar) to limit neck motion, wear it as instructed for as many days as your doctor tells you to. Do not wear it longer than you were told to. Wearing a brace for too long can make neck stiffness worse and weaken the neck muscles. · You can try using heat or ice to see if it helps. ? Try using a heating pad on a low or medium setting for 15 to 20 minutes every 2 to 3 hours. Try a warm shower in place of one session with the heating pad. You can also buy single-use heat wraps that last up to 8 hours. ? You can also try an ice pack for 10 to 15 minutes every 2 to 3 hours. · Take pain medicines exactly as directed. ? If the doctor gave you a prescription medicine for pain, take it as prescribed.   ? If you are not taking a prescription pain medicine, ask your doctor if you can take an over-the-counter medicine. · Gently rub the area to relieve pain and help with blood flow. Do not massage the area if it hurts to do so. · Do not do anything that makes the pain worse. Take it easy for a couple of days. You can do your usual activities if they do not hurt your neck or put it at risk for more stress or injury. · Try sleeping on a special neck pillow. Place it under your neck, not under your head. Placing a tightly rolled-up towel under your neck while you sleep will also work. If you use a neck pillow or rolled towel, do not use your regular pillow at the same time. · To prevent future neck pain, do exercises to stretch and strengthen your neck and back. Learn how to use good posture, safe lifting techniques, and proper body mechanics. When should you call for help? Call 911 anytime you think you may need emergency care. For example, call if:    · You are unable to move an arm or a leg at all.   [facility] your doctor now or seek immediate medical care if:    · You have new or worse symptoms in your arms, legs, chest, belly, or buttocks. Symptoms may include:  ? Numbness or tingling. ? Weakness. ? Pain.     · You lose bladder or bowel control.    Watch closely for changes in your health, and be sure to contact your doctor if:    · You are not getting better as expected. Where can you learn more? Go to http://yamilka-radames.info/. Enter M253 in the search box to learn more about \"Neck Strain: Care Instructions. \"  Current as of: September 20, 2018  Content Version: 11.9  © 6995-5895 [employer]wise, Incorporated. Care instructions adapted under license by [employer] (which disclaims liability or warranty for this information).  If you have questions about a medical condition or this instruction, always ask your healthcare professional. [address]  any warranty or liability for your use of this information. Patient Education        Learning About a Closed Head Injury  What is a closed head injury? A closed head injury happens when your head gets hit hard. The strong force of the blow causes your brain to shake in your skull. This movement can cause the brain to bruise, swell, or tear. Sometimes nerves or blood vessels also get damaged. This can cause bleeding in or around the brain. A concussion is a type of closed head injury. What are the symptoms? If you have a mild concussion, you may have a mild headache or feel \"not quite right. \" These symptoms are common. They usually go away over a few days to 4 weeks. But sometimes after a concussion, you feel like you can't function as well as before the injury. And you have new symptoms. This is called postconcussive syndrome. You may:  · Find it harder to solve problems, think, concentrate, or remember. · Have headaches. · Have changes in your sleep patterns, such as not being able to sleep or sleeping all the time. · Have changes in your personality. · Not be interested in your usual activities. · Feel angry or anxious without a clear reason. · Lose your sense of taste or smell. · Be dizzy, lightheaded, or unsteady. It may be hard to stand or walk. How is a closed head injury treated? Any person who may have a concussion needs to see a doctor. Some people have to stay in the hospital to be watched. Others can go home safely. If you go home, follow your doctor's instructions. He or she will tell you if you need someone to watch you closely for the next 24 hours or longer. Rest is the best treatment. Get plenty of sleep at night. And try to rest during the day. · Avoid activities that are physically or mentally demanding. These include housework, exercise, and schoolwork. And don't play video games, send text messages, or use the computer. You may need to change your school or work schedule to be able to avoid these activities.   · Ask your doctor when it's okay to drive, ride a bike, or operate machinery. · Take an over-the-counter pain medicine, such as acetaminophen (Tylenol), ibuprofen (Advil, Motrin), or naproxen (Aleve). Be safe with medicines. Read and follow all instructions on the label. · Check with your doctor before you use any other medicines for pain. · Do not drink alcohol or use illegal drugs. They can slow recovery. They can also increase your risk of getting a second head injury. Follow-up care is a key part of your treatment and safety. Be sure to make and go to all appointments, and call your doctor if you are having problems. It's also a good idea to know your test results and keep a list of the medicines you take. Where can you learn more? Go to http://yamilka-radames.info/. Enter E235 in the search box to learn more about \"Learning About a Closed Head Injury. \"  Current as of: Tiffanie 3, 2018  Content Version: 11.9  © 7040-7330 [employer]. Care instructions adapted under license by [employer] (which disclaims liability or warranty for this information). If you have questions about a medical condition or this instruction, always ask your healthcare professional. [address] any warranty or liability for your use of this information.
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
## 2019-07-10 NOTE — PROGRESS NOTES
SSED/CM  consult received for transportation. Discussed w/ Dr.Luis Hensley and Татьяна Sewell. Aroused patient and confirmed will obtain ride to [address] [place] [address] on [address] [place] [address]. Call placed to [address] [place] [address] spoke w/   Kathy Martins. Trip requested next available ETA -  to call this writer when en route ([id number]). Ride may take 1-3 hours requesting track ride in 1 hour. 1634 Call received from Luis Potter, Corinna-Nasim. Provider Rochelle Rivera will arrive in 20 minutes [phone]). Phone number for provider [phone]. Nursing informed of ride ETA.

## 2019-12-02 ENCOUNTER — APPOINTMENT (OUTPATIENT)
Dept: CT IMAGING | Age: 69
End: 2019-12-02
Attending: EMERGENCY MEDICINE
Payer: MEDICAID

## 2019-12-02 ENCOUNTER — HOSPITAL ENCOUNTER (EMERGENCY)
Age: 69
Discharge: HOME | End: 2019-12-02
Attending: EMERGENCY MEDICINE | Admitting: EMERGENCY MEDICINE
Payer: MEDICAID

## 2019-12-02 VITALS
OXYGEN SATURATION: 100 % | HEART RATE: 84 BPM | DIASTOLIC BLOOD PRESSURE: 89 MMHG | SYSTOLIC BLOOD PRESSURE: 148 MMHG | RESPIRATION RATE: 16 BRPM | TEMPERATURE: 97.3 F

## 2019-12-02 DIAGNOSIS — S09.90XA CLOSED HEAD INJURY, INITIAL ENCOUNTER: ICD-10-CM

## 2019-12-02 DIAGNOSIS — R51.9 ACUTE NONINTRACTABLE HEADACHE, UNSPECIFIED HEADACHE TYPE: Primary | ICD-10-CM
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
## 2019-12-02 PROCEDURE — 99284 EMERGENCY DEPT VISIT MOD MDM: CPT

## 2019-12-02 PROCEDURE — 74011250637 HC RX REV CODE- 250/637: Performed by: EMERGENCY MEDICINE

## 2019-12-02 PROCEDURE — 72125 CT NECK SPINE W/O DYE: CPT

## 2019-12-02 PROCEDURE — 70450 CT HEAD/BRAIN W/O DYE: CPT

## 2019-12-02 PROCEDURE — 70486 CT MAXILLOFACIAL W/O DYE: CPT

## 2019-12-02 RX ORDER — ACETAMINOPHEN 500 MG
1000 TABLET ORAL ONCE
Status: COMPLETED | OUTPATIENT
Start: 2019-12-02 | End: 2019-12-02

## 2019-12-02 RX ADMIN — ACETAMINOPHEN 1000 MG: 500 TABLET ORAL at 11:29
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
## 2019-12-02 NOTE — PROGRESS NOTES
SSED/CM consult received for transportation assistance . Met w/patient and introduced to role of CM. Patient is alert and states his Nurse is assisting. Confirmed location /address - Mr. Sharonda Akhtar states will need to go to [facility] at [address]. CM communicated w/ Flori Sosa and will need CM to assist w/ ride. This writer placed call to [address] [place] [address] [phone] spoke w/ Jaspal Stager and requested ride (informed of rollator utilized). Provided [facility] address [address] [id number] / [phone] . Confirmation [id number] informed patient ready now - Provider may take up to 3 hours. CM provided unit # and CM # for communication and updates. This writer requested call when  located and when 5 minutes from ED.      1505 Call received from [address] [facility]  [phone] informed provider located ETA 30-40 minutes ([phone])    [employer] [phone] has accepted ride. ETA 1540.

## 2019-12-02 NOTE — DISCHARGE INSTRUCTIONS
Patient Education        Learning About a Closed Head Injury  What is a closed head injury? A closed head injury happens when your head gets hit hard. The strong force of the blow causes your brain to shake in your skull. This movement can cause the brain to bruise, swell, or tear. Sometimes nerves or blood vessels also get damaged. This can cause bleeding in or around the brain. A concussion is a type of closed head injury. What are the symptoms? If you have a mild concussion, you may have a mild headache or feel \"not quite right. \" These symptoms are common. They usually go away over a few days to 4 weeks. But sometimes after a concussion, you feel like you can't function as well as before the injury. And you have new symptoms. This is called postconcussive syndrome. You may:  · Find it harder to solve problems, think, concentrate, or remember. · Have headaches. · Have changes in your sleep patterns, such as not being able to sleep or sleeping all the time. · Have changes in your personality. · Not be interested in your usual activities. · Feel angry or anxious without a clear reason. · Lose your sense of taste or smell. · Be dizzy, lightheaded, or unsteady. It may be hard to stand or walk. How is a closed head injury treated? Any person who may have a concussion needs to see a doctor. Some people have to stay in the hospital to be watched. Others can go home safely. If you go home, follow your doctor's instructions. He or she will tell you if you need someone to watch you closely for the next 24 hours or longer. Rest is the best treatment. Get plenty of sleep at night. And try to rest during the day. · Avoid activities that are physically or mentally demanding. These include housework, exercise, and schoolwork. And don't play video games, send text messages, or use the computer. You may need to change your school or work schedule to be able to avoid these activities.   · Ask your doctor when it's okay to drive, ride a bike, or operate machinery. · Take an over-the-counter pain medicine, such as acetaminophen (Tylenol), ibuprofen (Advil, Motrin), or naproxen (Aleve). Be safe with medicines. Read and follow all instructions on the label. · Check with your doctor before you use any other medicines for pain. · Do not drink alcohol or use illegal drugs. They can slow recovery. They can also increase your risk of getting a second head injury. Follow-up care is a key part of your treatment and safety. Be sure to make and go to all appointments, and call your doctor if you are having problems. It's also a good idea to know your test results and keep a list of the medicines you take. Where can you learn more? Go to http://yamilka-radames.info/. Enter E235 in the search box to learn more about \"Learning About a Closed Head Injury. \"  Current as of: March 28, 2019  Content Version: 12.2  © 8787-1306 [employer]. Care instructions adapted under license by [employer] (which disclaims liability or warranty for this information). If you have questions about a medical condition or this instruction, always ask your healthcare professional. [place] [address] any warranty or liability for your use of this information. Patient Education        Headache: Care Instructions  Your Care Instructions    Headaches have many possible causes. Most headaches aren't a sign of a more serious problem, and they will get better on their own. Home treatment may help you feel better faster. The doctor has checked you carefully, but problems can develop later. If you notice any problems or new symptoms, get medical treatment right away. Follow-up care is a key part of your treatment and safety. Be sure to make and go to all appointments, and call your doctor if you are having problems.  It's also a good idea to know your test results and keep a list of the medicines you take.  How can you care for yourself at home? · Do not drive if you have taken a prescription pain medicine. · Rest in a quiet, dark room until your headache is gone. Close your eyes and try to relax or go to sleep. Don't watch TV or read. · Put a cold, moist cloth or cold pack on the painful area for 10 to 20 minutes at a time. Put a thin cloth between the cold pack and your skin. · Use a warm, moist towel or a heating pad set on low to relax tight shoulder and neck muscles. · Have someone gently massage your neck and shoulders. · Take pain medicines exactly as directed. ? If the doctor gave you a prescription medicine for pain, take it as prescribed. ? If you are not taking a prescription pain medicine, ask your doctor if you can take an over-the-counter medicine. · Be careful not to take pain medicine more often than the instructions allow, because you may get worse or more frequent headaches when the medicine wears off. · Do not ignore new symptoms that occur with a headache, such as a fever, weakness or numbness, vision changes, or confusion. These may be signs of a more serious problem. To prevent headaches  · Keep a headache diary so you can figure out what triggers your headaches. Avoiding triggers may help you prevent headaches. Record when each headache began, how long it lasted, and what the pain was like (throbbing, aching, stabbing, or dull). Write down any other symptoms you had with the headache, such as nausea, flashing lights or dark spots, or sensitivity to bright light or loud noise. Note if the headache occurred near your period. List anything that might have triggered the headache, such as certain foods (chocolate, cheese, wine) or odors, smoke, bright light, stress, or lack of sleep. · Find healthy ways to deal with stress. Headaches are most common during or right after stressful times.  Take time to relax before and after you do something that has caused a headache in the past.  · Try to keep your muscles relaxed by keeping good posture. Check your jaw, face, neck, and shoulder muscles for tension, and try relaxing them. When sitting at a desk, change positions often, and stretch for 30 seconds each hour. · Get plenty of sleep and exercise. · Eat regularly and well. Long periods without food can trigger a headache. · Treat yourself to a massage. Some people find that regular massages are very helpful in relieving tension. · Limit caffeine by not drinking too much coffee, tea, or soda. But don't quit caffeine suddenly, because that can also give you headaches. · Reduce eyestrain from computers by blinking frequently and looking away from the computer screen every so often. Make sure you have proper eyewear and that your monitor is set up properly, about an arm's length away. · Seek help if you have depression or anxiety. Your headaches may be linked to these conditions. Treatment can both prevent headaches and help with symptoms of anxiety or depression. When should you call for help? Call 911 anytime you think you may need emergency care. For example, call if:    · You have signs of a stroke. These may include:  ? Sudden numbness, paralysis, or weakness in your face, arm, or leg, especially on only one side of your body. ? Sudden vision changes. ? Sudden trouble speaking. ? Sudden confusion or trouble understanding simple statements. ? Sudden problems with walking or balance. ? A sudden, severe headache that is different from past headaches.    Call your doctor now or seek immediate medical care if:    · You have a new or worse headache.     · Your headache gets much worse. Where can you learn more? Go to http://yamilka-radames.info/. Enter M271 in the search box to learn more about \"Headache: Care Instructions. \"  Current as of: March 28, 2019  Content Version: 12.2  © 8195-1148 [employer].  Care instructions adapted under license by [employer] (which disclaims liability or warranty for this information). If you have questions about a medical condition or this instruction, always ask your healthcare professional. [address] any warranty or liability for your use of this information.
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
## 2020-07-03 ENCOUNTER — ED HISTORICAL/CONVERTED ENCOUNTER (OUTPATIENT)
Dept: OTHER | Age: 70
End: 2020-07-03

## 2020-07-05 ENCOUNTER — ED HISTORICAL/CONVERTED ENCOUNTER (OUTPATIENT)
Dept: OTHER | Age: 70
End: 2020-07-05

## 2020-12-18 ENCOUNTER — HOSPITAL ENCOUNTER (EMERGENCY)
Age: 70
Discharge: HOME | End: 2020-12-18
Attending: FAMILY MEDICINE
Payer: MEDICAID

## 2020-12-18 VITALS
DIASTOLIC BLOOD PRESSURE: 73 MMHG | OXYGEN SATURATION: 98 % | WEIGHT: 150 LBS | TEMPERATURE: 98.1 F | RESPIRATION RATE: 16 BRPM | HEIGHT: 66 IN | SYSTOLIC BLOOD PRESSURE: 118 MMHG | HEART RATE: 66 BPM | BODY MASS INDEX: 24.11 KG/M2

## 2020-12-18 DIAGNOSIS — Z04.3 ENCOUNTER FOR EXAMINATION FOLLOWING A FALL: Primary | ICD-10-CM

## 2020-12-18 PROCEDURE — 99283 EMERGENCY DEPT VISIT LOW MDM: CPT
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
## 2020-12-19 NOTE — ED PROVIDER NOTES
[facility]    HISTORY AND PHYSICAL EXAM      Date: 12/18/2020  Patient Name: Clifford Orozco  MRN: [id number]  Room:  [place] [address]    History of Presenting Illness     Chief Complaint:  Fall       History Provided By: Patient  HPI/KYREE Limits: None    HPI: Misa Johnson is a 69 y.o. male presenting to the ED with CC of Fall with initial onset just PTA. The patient states that he fell out of bed. The patient is a resident of a local group facility and was sent to the ED for evaluation. The patient denies any new injuries or pain. Currently, the patient denies headache, new back, joint or extremity pain, anesthesia, paresthesia, paralysis, or los of function. There are no other complaints, changes, or physical findings at this time. PCP: Phyllis Barney MD    No current facility-administered medications on file prior to encounter. Current Outpatient Medications on File Prior to Encounter   Medication Sig Dispense Refill    hydrocortisone (PROCTOSOL HC) 2.5 % rectal cream insert rectally four times a day as needed 28.35 g 12    fluticasone-salmeterol (ADVAIR) 250-50 mcg/dose diskus inhaler Take 1 Puff by inhalation two (2) times a day. 1 Inhaler 12    ibuprofen (MOTRIN) 800 mg tablet take 1 tablet by mouth twice a day with food 60 Tab 12    trihexyphenidyl (ARTANE) 5 mg tablet Take 1 Tab by mouth three (3) times daily. 80 Tab 3    Walker misc Needs evaluation to proper type 1 Each 0    ergocalciferol (ERGOCALCIFEROL) 50,000 unit capsule Take 1 Cap by mouth every seven (7) days. 12 Cap 3    hydroCHLOROthiazide (HYDRODIURIL) 25 mg tablet take 1 tablet by mouth once daily 30 Tab 12    gabapentin (NEURONTIN) 300 mg capsule Take 1 Cap by mouth three (3) times daily. 90 Cap 12    fluPHENAZine decanoate (PROLIXIN) 25 mg/mL injection       cholecalciferol (VITAMIN D3) 1,000 unit cap Take  by mouth daily.      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 PAST SURGICAL  The patient  has a past surgical history that includes hx other surgical; hx other surgical; hx orthopaedic; and hx orthopaedic (11/07/2016). SOCIAL HISTORY  The patient  reports that he has been smoking. He has been smoking about 0.25 packs per day. He has never used smokeless tobacco. He reports that he does not drink alcohol or use drugs. Allergies: The patient has No Known Allergies. Review of Systems     Review of Systems   Constitutional: Negative for chills and fever. HENT: Negative. Eyes: Negative for visual disturbance. Respiratory: Negative for cough and shortness of breath. Cardiovascular: Negative for chest pain. Gastrointestinal: Negative for abdominal pain, diarrhea, nausea and vomiting. Endocrine: Negative. Genitourinary: Negative for discharge, dysuria and penile pain. Musculoskeletal: Negative. Skin: Negative. Allergic/Immunologic: Negative. Neurological: Negative. Hematological: Negative. Psychiatric/Behavioral: Negative. Physical Exam     Vital Signs-Reviewed the patient's vital signs. Patient Vitals for the past 24 hrs:   Temp Pulse Resp BP SpO2   12/18/20 1912 98.1 °F (36.7 °C) 66 16 118/73 98 %        Physical Exam  Constitutional:       Appearance: Normal appearance. HENT:      Head: Normocephalic and atraumatic. Mouth/Throat:      Mouth: Mucous membranes are moist.   Eyes:      Extraocular Movements: Extraocular movements intact. Conjunctiva/sclera: Conjunctivae normal.      Pupils: Pupils are equal, round, and reactive to light.    Neck: Musculoskeletal: Normal range of motion and neck supple. Cardiovascular:      Rate and Rhythm: Normal rate and regular rhythm. Pulses: Normal pulses. Heart sounds: Normal heart sounds. Pulmonary:      Effort: Pulmonary effort is normal.      Breath sounds: Normal breath sounds. Abdominal:      General: Abdomen is flat. Bowel sounds are normal.      Palpations: Abdomen is soft. Musculoskeletal: Normal range of motion. Skin:     General: Skin is warm and dry. Neurological:      General: No focal deficit present. Mental Status: He is alert and oriented to person, place, and time. Psychiatric:         Mood and Affect: Mood normal.         Behavior: Behavior normal.       Diagnostic Study Results     Labs -   No results found for this or any previous visit (from the past 12 hour(s)). Radiologic Studies -   No orders to display     CT Results  (Last 48 hours)    None        CXR Results  (Last 48 hours)    None          Procedures/Critical Care     PROCEDURES  None    Medical Decision Making   I am the first provider for this patient. I reviewed the vital signs, available nursing notes, past medical history, past surgical history, family history and social history. Records Reviewed: Nursing Notes    ED Course:   Initial assessment performed. The patients presenting problems have been discussed, and they are in agreement with the care plan formulated and outlined with them. I have encouraged them to ask questions as they arise throughout their visit. Medications - No data to display    Provider Notes (Medical Decision Making): The patient presented to the emergency department with complaint of fall out of bed without head injury/LOC. Evaluation of the patient is negative for acute physical exam findings. Diagnostic (laboratory/radiographic) evaluation is not indicated.   Symptoms are not suggestive of closed head injury with TBI or cerebral bleed, obvious fractures, or other serious etiology. These diagnoses have been considered and excluded clinically. Given the low risk of these diagnoses further testing and evaluation does not appear to be indicated at this time. Diagnostic impression and plan were discussed and agreed upon with the patient and/or family. The patient has received maximum benefit from this visit and felt eligible for discharge. Closed head injury precautions were given. All questions were answered and concerns addressed. I emphasized the need for close follow-up with the patient's primary care physician, and that should the symptoms worsen or change in anyway that they are to return to the ER immediately for re-evaluation. Patient discharged in stable condition. Diagnosis/Plan/Follow Up     CLINICAL IMPRESSION:      ICD-10-CM ICD-9-CM   1. Encounter for examination following a fall  Z04.3 V71.4        DISPOSITION:  Discharged to Home/self care in stable condition. PLAN/FOLLOW UP  1. Current Discharge Medication List        2. The patients results have been reviewed with them. The patient has been counseled regarding their diagnosis. The patient verbally conveys understanding and agreement of the signs, symptoms, diagnosis, treatment and prognosis and additionally agrees to follow up as recommended with their PCP. The patient also agrees with the care-plan and conveys that all of their questions have been answered. I have also put together some discharge instructions for them that include: 1) educational information regarding their diagnosis, 2) how to care for their diagnosis at home, as well as a 3) list of reasons why they would want to return to the ED prior to their follow-up appointment, should their condition change.     Follow-up Information     Follow up With Specialties Details Why Niru Diaz MD Internal Medicine Schedule an appointment as soon as possible for a visit  As needed [address] [place] [address]  [phone]      [facility] Go in 2 days  [address]  [phone]        Return to ED if worse       PEBBLES Lipscomb M.D.   [facility]  Emergency Department Physician

## 2020-12-24 ENCOUNTER — APPOINTMENT (OUTPATIENT)
Dept: GENERAL RADIOLOGY | Age: 70
End: 2020-12-24
Attending: EMERGENCY MEDICINE
Payer: MEDICAID

## 2020-12-24 ENCOUNTER — HOSPITAL ENCOUNTER (OUTPATIENT)
Age: 70
Setting detail: OBSERVATION
Discharge: HOME | End: 2021-01-13
Attending: EMERGENCY MEDICINE | Admitting: HOSPITALIST
Payer: MEDICAID

## 2020-12-24 DIAGNOSIS — F20.9 SCHIZOPHRENIA, UNSPECIFIED TYPE (HCC): Primary | ICD-10-CM

## 2020-12-24 LAB
AMPHET UR QL SCN: NEGATIVE
ANION GAP SERPL CALC-SCNC: 5 MMOL/L (ref 5–15)
APPEARANCE UR: ABNORMAL
ATRIAL RATE: 64 BPM
BACTERIA URNS QL MICRO: NEGATIVE /HPF
BARBITURATES UR QL SCN: NEGATIVE
BASOPHILS # BLD: 0 K/UL (ref 0–0.1)
BASOPHILS NFR BLD: 0 % (ref 0–1)
BENZODIAZ UR QL: NEGATIVE
BILIRUB UR QL: NEGATIVE
BUN SERPL-MCNC: 14 MG/DL (ref 6–20)
BUN/CREAT SERPL: 22 (ref 12–20)
CA-I BLD-MCNC: 9.2 MG/DL (ref 8.5–10.1)
CALCULATED P AXIS, ECG09: 60 DEGREES
CALCULATED R AXIS, ECG10: 47 DEGREES
CALCULATED T AXIS, ECG11: 47 DEGREES
CANNABINOIDS UR QL SCN: NEGATIVE
CHLORIDE SERPL-SCNC: 106 MMOL/L (ref 97–108)
CO2 SERPL-SCNC: 28 MMOL/L (ref 21–32)
COCAINE UR QL SCN: NEGATIVE
COLOR UR: ABNORMAL
CREAT SERPL-MCNC: 0.65 MG/DL (ref 0.7–1.3)
DIAGNOSIS, 93000: NORMAL
DIFFERENTIAL METHOD BLD: ABNORMAL
DRUG SCRN COMMENT,DRGCM: NORMAL
EOSINOPHIL # BLD: 0.1 K/UL (ref 0–0.4)
EOSINOPHIL NFR BLD: 2 % (ref 0–7)
ERYTHROCYTE [DISTWIDTH] IN BLOOD BY AUTOMATED COUNT: 13.7 % (ref 11.5–14.5)
FLUAV AG NPH QL IA: NEGATIVE
FLUBV AG NOSE QL IA: NEGATIVE
GLUCOSE SERPL-MCNC: 88 MG/DL (ref 65–100)
GLUCOSE UR STRIP.AUTO-MCNC: NEGATIVE MG/DL
HCT VFR BLD AUTO: 40.6 % (ref 36.6–50.3)
HGB BLD-MCNC: 13.7 G/DL (ref 12.1–17)
HGB UR QL STRIP: NEGATIVE
IMM GRANULOCYTES # BLD AUTO: 0 K/UL (ref 0–0.04)
IMM GRANULOCYTES NFR BLD AUTO: 0 % (ref 0–0.5)
KETONES UR QL STRIP.AUTO: NEGATIVE MG/DL
LEUKOCYTE ESTERASE UR QL STRIP.AUTO: NEGATIVE
LYMPHOCYTES # BLD: 1.9 K/UL (ref 0.8–3.5)
LYMPHOCYTES NFR BLD: 50 % (ref 12–49)
MCH RBC QN AUTO: 31.3 PG (ref 26–34)
MCHC RBC AUTO-ENTMCNC: 33.7 G/DL (ref 30–36.5)
MCV RBC AUTO: 92.7 FL (ref 80–99)
METHADONE UR QL: NEGATIVE
MONOCYTES # BLD: 0.3 K/UL (ref 0–1)
MONOCYTES NFR BLD: 7 % (ref 5–13)
MUCOUS THREADS URNS QL MICRO: ABNORMAL /LPF
NEUTS SEG # BLD: 1.6 K/UL (ref 1.8–8)
NEUTS SEG NFR BLD: 41 % (ref 32–75)
NITRITE UR QL STRIP.AUTO: NEGATIVE
OPIATES UR QL: NEGATIVE
P-R INTERVAL, ECG05: 166 MS
PCP UR QL: NEGATIVE
PH UR STRIP: 7 [PH] (ref 5–8)
PLATELET # BLD AUTO: 301 K/UL (ref 150–400)
PMV BLD AUTO: 11.2 FL (ref 8.9–12.9)
POTASSIUM SERPL-SCNC: 3.8 MMOL/L (ref 3.5–5.1)
PROT UR STRIP-MCNC: NEGATIVE MG/DL
Q-T INTERVAL, ECG07: 416 MS
QRS DURATION, ECG06: 86 MS
QTC CALCULATION (BEZET), ECG08: 429 MS
RBC # BLD AUTO: 4.38 M/UL (ref 4.1–5.7)
RBC #/AREA URNS HPF: ABNORMAL /HPF (ref 0–5)
SARS-COV-2, COV2: NORMAL
SARS-COV-2, COV2: NOT DETECTED
SODIUM SERPL-SCNC: 139 MMOL/L (ref 136–145)
SP GR UR REFRACTOMETRY: 1.01 (ref 1–1.03)
TROPONIN I SERPL-MCNC: <0.05 NG/ML
UROBILINOGEN UR QL STRIP.AUTO: 2 EU/DL (ref 0.1–1)
VENTRICULAR RATE, ECG03: 64 BPM
WBC # BLD AUTO: 3.8 K/UL (ref 4.1–11.1)
WBC URNS QL MICRO: ABNORMAL /HPF (ref 0–4)

## 2020-12-24 PROCEDURE — 80307 DRUG TEST PRSMV CHEM ANLYZR: CPT

## 2020-12-24 PROCEDURE — 85025 COMPLETE CBC W/AUTO DIFF WBC: CPT

## 2020-12-24 PROCEDURE — 93005 ELECTROCARDIOGRAM TRACING: CPT

## 2020-12-24 PROCEDURE — 74011250636 HC RX REV CODE- 250/636: Performed by: EMERGENCY MEDICINE

## 2020-12-24 PROCEDURE — 87635 SARS-COV-2 COVID-19 AMP PRB: CPT

## 2020-12-24 PROCEDURE — 74011250637 HC RX REV CODE- 250/637: Performed by: EMERGENCY MEDICINE

## 2020-12-24 PROCEDURE — 81003 URINALYSIS AUTO W/O SCOPE: CPT

## 2020-12-24 PROCEDURE — 36415 COLL VENOUS BLD VENIPUNCTURE: CPT

## 2020-12-24 PROCEDURE — 71046 X-RAY EXAM CHEST 2 VIEWS: CPT

## 2020-12-24 PROCEDURE — 96372 THER/PROPH/DIAG INJ SC/IM: CPT

## 2020-12-24 PROCEDURE — 80048 BASIC METABOLIC PNL TOTAL CA: CPT

## 2020-12-24 PROCEDURE — 84484 ASSAY OF TROPONIN QUANT: CPT

## 2020-12-24 PROCEDURE — 99285 EMERGENCY DEPT VISIT HI MDM: CPT

## 2020-12-24 PROCEDURE — 87804 INFLUENZA ASSAY W/OPTIC: CPT

## 2020-12-24 RX ORDER — ACETAMINOPHEN 325 MG/1
650 TABLET ORAL ONCE
Status: COMPLETED | OUTPATIENT
Start: 2020-12-24 | End: 2020-12-24

## 2020-12-24 RX ORDER — LORAZEPAM 2 MG/ML
INJECTION INTRAMUSCULAR
Status: DISPENSED
Start: 2020-12-24 | End: 2020-12-24

## 2020-12-24 RX ORDER — LORAZEPAM 2 MG/ML
1 INJECTION INTRAMUSCULAR
Status: COMPLETED | OUTPATIENT
Start: 2020-12-24 | End: 2020-12-24

## 2020-12-24 RX ORDER — HALOPERIDOL 5 MG/ML
2.5 INJECTION INTRAMUSCULAR ONCE
Status: COMPLETED | OUTPATIENT
Start: 2020-12-24 | End: 2020-12-24

## 2020-12-24 RX ORDER — HALOPERIDOL 5 MG/ML
INJECTION INTRAMUSCULAR
Status: DISPENSED
Start: 2020-12-24 | End: 2020-12-24

## 2020-12-24 RX ADMIN — LORAZEPAM 1 MG: 2 INJECTION INTRAMUSCULAR; INTRAVENOUS at 11:36

## 2020-12-24 RX ADMIN — HALOPERIDOL LACTATE 2.5 MG: 5 INJECTION, SOLUTION INTRAMUSCULAR at 11:34

## 2020-12-24 RX ADMIN — ACETAMINOPHEN 650 MG: 325 TABLET, FILM COATED ORAL at 14:03

## 2020-12-24 RX ADMIN — SODIUM CHLORIDE 1000 ML: 9 INJECTION, SOLUTION INTRAVENOUS at 14:03
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
## 2020-12-24 NOTE — ED PROVIDER NOTES
EMERGENCY DEPARTMENT HISTORY AND PHYSICAL EXAM        Date: 12/24/2020  Patient Name: Brian Freeman    History of Presenting Illness     Chief Complaint   Patient presents with    Generalized Body Aches    Mental Health Problem       11:16 AM    History Provided By: Patient and EMS    HPI: Brian Freeman, 69 y.o. male with a history of:  -Hepetitis C  -HTN  -DJD  -HIV  -Heroine abuse    Pt presents to the ED today via EMS c/o generalized body pains that began yesterday. Pt notes accompanying subjective fever, nausea, constipation, dysuria, urinary incontinence. Pt reports situational stressors regarding the current pandemic and death within the family. Pt denies any COVID-19 exposures. Pt does admit to using heroine in the past. Pt also reports that he was recently schedule for abdominal surgery; he is unsure what it was for. Pt is also uncertain of what he had for breakfast/if he ate breakfast this morning. Per EMS, pt became agitated and started cursing when EMS was taking pt's vitals. During transport, pt calmed down slightly, but became agitated on ED arrival. Pt stated screaming expletives at nursing staff. Pt was able to calm down with time; he apologized for his behavior, and he was willing to accept medical intervention as well as give a history for his current symptoms. Per Nursing home staff, pt has a history of psychosis and, when calm, is at baseline status. Addendum at [phone] 12/24/20: Additional history per nursing home: pt has a history of schizophrenia. Pt started living at the faciltiy since 12/6/20. nursing home noticed a pattern of escalating behavior since arrival. Pt was throwing chairs and acting inappropriately. nursing home states that even if pt is started on psychotropic or antianxiety medication, nursing facility will not take pt back. Pt originally came from [facility] [place].     PCP: Kenna Matamoros MD    Current Facility-Administered Medications   Medication Dose Route Frequency Provider Last Rate Last Admin    amLODIPine (NORVASC) tablet 5 mg  5 mg Oral DAILY Tru Clayton PA-C   5 mg at 12/27/20 0915    sodium chloride (NS) flush 5-40 mL  5-40 mL IntraVENous Q8H Avila Clayton PA-C   10 mL at 12/27/20 1329    sodium chloride (NS) flush 5-40 mL  5-40 mL IntraVENous PRN Tru Clayton PA-C        acetaminophen (TYLENOL) tablet 650 mg  650 mg Oral Q6H PRN Tru Clayton PA-C        Or    acetaminophen (TYLENOL) suppository 650 mg  650 mg Rectal Q6H PRN Tru Clayton PA-C        polyethylene glycol (MIRALAX) packet 17 g  17 g Oral DAILY PRN Tru Clayton PA-C        promethazine (PHENERGAN) tablet 12.5 mg  12.5 mg Oral Q6H PRN Tru Clayton PA-C        Or    ondansetron (ZOFRAN) injection 4 mg  4 mg IntraVENous Q6H PRN Tru Clayton PA-C        enoxaparin (LOVENOX) injection 40 mg  40 mg SubCUTAneous DAILY Tru Clayton PA-C   40 mg at 12/27/20 0913    FLUoxetine (PROzac) capsule 10 mg  10 mg Oral DAILY Monserrat King MD   10 mg at 12/27/20 0913    gabapentin (NEURONTIN) capsule 300 mg  300 mg Oral TID Monserrat King MD   300 mg at 12/27/20 1544    risperiDONE (RisperDAL) tablet 1 mg  1 mg Oral BID Monserrat King MD   1 mg at 12/27/20 0913       Past History     Past Medical History:  Past Medical History:   Diagnosis Date    Chronic hepatitis C without mention of hepatic coma 4/7/2011    Degenerative Joint Disease 7/13/2011    Hematochezia 4/7/2011    HIV (human immunodeficiency virus infection) ([address] 4/7/2011    Hypetension 4/7/2011    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  arm surgery due to abscess    HX OTHER SURGICAL      flank mole removal       Family History:  Family History   Problem Relation Age of Onset    Kidney Disease Mother     Diabetes Mother     No Known Problems Father     Heart Disease Sister     Kidney Disease Sister     Diabetes Brother     Heart Disease Brother     Cancer Brother     Alcohol abuse Brother     Obesity Brother     Other Brother         Drugs       Social History:  Social History     Tobacco Use    Smoking status: Current Every Day Smoker     Packs/day: 0.25    Smokeless tobacco: Never Used   Substance Use Topics    Alcohol use: No    Drug use: No       Allergies:  No Known Allergies    Review of Systems   Review of Systems   Constitutional: Negative for chills, diaphoresis and fever. HENT: Negative for congestion, sore throat and trouble swallowing. Eyes: Negative for visual disturbance. Respiratory: Negative for cough and shortness of breath. Cardiovascular: Negative for chest pain and palpitations. Gastrointestinal: Positive for constipation and nausea. Negative for abdominal pain, diarrhea and vomiting. Endocrine: Negative for polydipsia, polyphagia and polyuria. Genitourinary: Positive for dysuria, frequency and urgency. Negative for hematuria. Musculoskeletal: Positive for myalgias. Negative for gait problem and neck pain. Skin: Negative for rash. Neurological: Negative for dizziness, syncope and headaches. Physical Exam   Physical Exam  Constitutional:       General: He is not in acute distress. Appearance: He is well-developed. He is not ill-appearing. Comments: Thin, edentulous AA male, very agitated, yelling cursing with very tangential speech; however, was able demonstrate some self soothing behavior. HENT:      Head: Normocephalic and atraumatic. Nose: Nose normal.      Mouth/Throat:      Pharynx: No posterior oropharyngeal erythema. Comments: Oral mucosa is tacky, edentulous   Eyes:      General: Vision grossly intact. Extraocular Movements: Extraocular movements intact. Conjunctiva/sclera:      Right eye: Right conjunctiva is injected. Left eye: Left conjunctiva is injected. Pupils: Pupils are equal, round, and reactive to light. Comments: Sclerae are muddy. Neck:      Musculoskeletal: Neck supple. Vascular: No JVD. Trachea: No tracheal deviation. Cardiovascular:      Rate and Rhythm: Normal rate and regular rhythm. Pulses: Normal pulses. Carotid pulses are 2+ on the right side and 2+ on the left side. Radial pulses are 2+ on the right side and 2+ on the left side. Femoral pulses are 2+ on the right side and 2+ on the left side. Popliteal pulses are 2+ on the right side and 2+ on the left side. Dorsalis pedis pulses are 2+ on the right side and 2+ on the left side. Posterior tibial pulses are 2+ on the right side and 2+ on the left side. Heart sounds: Normal heart sounds. Pulmonary:      Breath sounds: Normal air entry. Examination of the right-lower field reveals rhonchi. Rhonchi present. No wheezing. Abdominal:      General: Abdomen is protuberant. Bowel sounds are normal.      Palpations: Abdomen is soft. Tenderness: There is no abdominal tenderness. There is no guarding or rebound. Musculoskeletal:         General: No swelling or deformity. Right shoulder: He exhibits normal range of motion and no swelling. Right lower leg: No edema. Left lower leg: No edema. Skin:     General: Skin is warm and dry. Capillary Refill: Capillary refill takes less than 2 seconds. Findings: No signs of injury or rash. Neurological:      General: No focal deficit present. Mental Status: He is alert and oriented to person, place, and time. Cranial Nerves: No cranial nerve deficit.       Comments: Normal Speech   Psychiatric:         Attention and Perception: Attention normal.         Mood and Affect: Mood and affect normal.         Speech: Speech normal.         Behavior: Behavior is cooperative. Diagnostic Study Results     Labs -   No results found for this or any previous visit (from the past 48 hour(s)). Radiologic Studies -   XR CHEST PA LAT   Final Result        CT Results  (Last 48 hours)    None        CXR Results  (Last 48 hours)    None          Medical Decision Making and ED Course     I have also reviewed the vital signs, available nursing notes, past medical history, past surgical history, family history and social history. Vital Signs - Reviewed the patient's vital signs. Patient Vitals for the past 12 hrs:   Temp Pulse Resp BP SpO2   12/27/20 1428 97.5 °F (36.4 °C) 82 18 122/63 98 %   12/27/20 0810 97.8 °F (36.6 °C) 66 18 (!) 154/84 100 %   Records Reviewed: Nursing Notes, Old medical records and Ambulance Run Sheet as available. Medical Decision Making/Diff Dx:  viral illness, pneumonia, UTI, acute on chronic psychotic break, chronic schizophrenia, COVID-19, influenza, electrolyte abnormality, acute aggressive behavior, dehydration    70 y/o male with history of schizophrenia presents with complaints of physical illness that seem more consistent with a viral infection. Will do screening labs, CXR, EKG, and disposition per results. ED course/Re-evaluation/Consultations/Other     ED Course as of Dec 27 1944   Thu Dec 24, 2020   1415 Additional history per nursing home: pt has a history of schizophrenia. Pt started living at the facility since 12/6/20. nursing home noticed a pattern of escalating behavior since arrival. Pt was throwing chairs and acting inappropriately. nursing home states that even if pt is started on psychotropic or antianxiety medication, nursing facility will not take pt back. Pt originally came from [facility].     Pt's work up is negative so far, waiting urine for complete medical clearance     (aidan) has been consulted for psychiatric evaluation as additional history and work up demonstrated only psychiatric issues remaining. [RD]   9696 Patient's urine is within normal limits and the patient is medically clear.    [CS]   1859 Patient's been cleared from a psychiatric standpoint is now placement issue. Will place psychiatric consult for continuity of care while the patient is in the emergency room. [CS]   Sat Dec 26, 2020   1038 Patient remains in the emergency room with no disposition at this time.  to reevaluate the case. We have also attempted to contact [place] [phone] to see if they are aware of the patient and have any resources. At this point patient's vitals are stable. He is asymptomatic. Concern for placement needed. Will discuss the case with the hospitalist for social admission. I did discuss the case with the nursing supervisor who gave the permission to ask for social admission. [HP]      ED Course User Index  [CS] Raven Sanabria MD  [HP] Geremias Morrell MD  [RD] Gretta River       Procedures     PROCEDURES:  Procedures  Tanja Velez MD    CRITICAL CARE NOTE:   11:59 AM  Amount of critical care time: 40 minutes  Impending deterioration: Metabolic  Associated risk factors: Shock, Trauma and Metabolic changes  Management: Bedside Assessment and Supervision of Care  Interpretation: Blood Pressure  Interventions: Consciousness sedation  Case review: Nursing  Treatment response: Stable  Performed by: Self  Notes: I have spent critical care time involved in lab review, decision making, bedside attention, and documentation. This time excludes time spent in any separate billed procedures. During this entire length of time I was immediately available to the patient. Tanja Velez MD    Disposition     Admitted    DISCHARGE PLAN:  1. Current Discharge Medication List        2.    Current Discharge Medication List      CONTINUE these medications which have NOT CHANGED    Details   hydrocortisone (PROCTOSOL HC) 2.5 % rectal cream insert rectally four times a day as needed  Qty: 28.35 g, Refills: 12      fluticasone-salmeterol (ADVAIR) 250-50 mcg/dose diskus inhaler Take 1 Puff by inhalation two (2) times a day. Qty: 1 Inhaler, Refills: 12      ibuprofen (MOTRIN) 800 mg tablet take 1 tablet by mouth twice a day with food  Qty: 60 Tab, Refills: 12      trihexyphenidyl (ARTANE) 5 mg tablet Take 1 Tab by mouth three (3) times daily. Qty: 90 Tab, Refills: 3      [facility] Needs evaluation to proper type  Qty: 1 Each, Refills: 0      ergocalciferol (ERGOCALCIFEROL) 50,000 unit capsule Take 1 Cap by mouth every seven (7) days. Qty: 12 Cap, Refills: 3    Associated Diagnoses: Vitamin D deficiency      hydroCHLOROthiazide (HYDRODIURIL) 25 mg tablet take 1 tablet by mouth once daily  Qty: 30 Tab, Refills: 12      gabapentin (NEURONTIN) 300 mg capsule Take 1 Cap by mouth three (3) times daily. Qty: 90 Cap, Refills: 12      fluPHENAZine decanoate (PROLIXIN) 25 mg/mL injection       cholecalciferol (VITAMIN D3) 1,000 unit cap Take  by mouth daily. Associated Diagnoses: Right pontine stroke ([address] Stenosis of both carotid arteries without infarction; Cerebral microvascular disease; Stenosis of cervical spine with myelopathy ([address] Ataxic gait; B12 deficiency; Vitamin D deficiency; Hypothyroidism due to acquired atrophy of thyroid      FLUoxetine (PROZAC) 10 mg capsule Refills: 0           3. Follow-up Information     Follow up With Specialties Details Why [address] MD [facility]   [facility]  [phone]          4. Return to ED if worse     Diagnosis     Clinical impression:   1. Schizophrenia, unspecified type ([address]           Attestation:  I, [Shaun Marion] am scribing for, and in the presence of Dr. Golden Lucas MD]   I, Dr Golden Lucas MD], have reviewed any and all documentation by a scribe and authenticated its accuracy.

## 2020-12-24 NOTE — BSMART NOTE
Writer asked to assess Pt after group home staff reported erratic behavior. Writer spoke w/ Noe Ramachandranrobbi at the group home [phone]. Noe Henley reports Pt is not welcome to return to her group home due to her erratic behavior and reports Pt has been [place] out\" and 'throwing objects' at other residents. Noe Henley reports Pt needs assistance w/ his ADLs and Pt has been non compliant w/ anyone helping him. Noe Lory reports Pt has fallen in the house before due to refusing assistance. Noe Henley reports Pt came to her from [facility] and has a hx of schizophrenia but has not been on any psych meds. Writer attempted to assess Pt but Pt asleep at this time. Writer will reattempt at another time.

## 2020-12-24 NOTE — BSMART NOTE
BH Intake Pt assessed face to face in ED. Pt present disheveled, fair eye contact, cooperative, open. Pt was A&Ox2 - disoriented to place and situation. Pt's speech was slow, slurred due to missing teeth, even and appropriate affect. Pt's thought process was logical, focused, Pt was not observed responding to internal stimuli. Pt denies SI, HI. Pt presents w/ little insight, poor judgment. Pt's memory appeared impaired immediate - repeated himself at times. Pt admits to inappropriate behaviors in the group house. Pt reports he woke up this morning in pain, \"body pain\" in his \"bones\" and reports having 'very bad medical conditions\". Pt admits to hx of schizophrenia, denies any psych meds - reports he used to take a monthly shot but couldn't report his last one. Pt reports a hx of seeing a counselor but hasn't in quite some time. Pt reports not knowing who his payee is or where his SSI is going. Pt said he stopped seeing his counselor because he lost his insurance. Pt denies SI, denies HI. Writer asked if Pt felt depressed at all and Pt asked what that meant so writer explained it and Pt said 'yes\" but then went on to say he always tries to keep a smile on his face. Writer asked about AVH and pt asked what that meant too and then stated \"this is a good question\". Pt reports voices that are a \"set up\" and then talked about his hx of abusing heroin and reported he only heard voices while using drugs. Pt reports a sleep disturbance because \"no one is beside him\". Pt endorsed a decrease in appetite and talked about not wanting to eat food that didn't taste good. Pt reported feeling sad about nothing having a girlfriend, not having anything to his name, not even \"a piece of candy\" and reports wanting to be \"social\" and build relationships w/ people Pt said he wished he has someone that worried about him and asked about him. Pt reports difficulty communicating w/ people at this group home and reports that led to him being frustrated and his aggressive behaviors. Pt reports being close w/ his Niece Leroy Moreno [phone]. Pt was asked about his last [facility] admission and Pt talked about being at a place in [place] but to this writer it sounded more like a nursing home or [facility] than a psych facility. Pt has no housing to return to. Writer spoke w/  who reports PT does not meet [facility] admission criteria due to no SI, HI, psychosis. ED CM to work on placement.

## 2020-12-24 NOTE — PROGRESS NOTES
12/24/20. CM informed by staff that  stated pt could not return d/t behaviors. KRISTAN called [facility] ( )  @ [phone]. Per manger she received pt on 12/6/20 from [facility] & was not told of any behaviors. Per manager she does not feel that her home is the proper place for pt d/t his threating  approach with her & other pts, refuses for her to perform care, refuses meds, yells, & she feels he may cause harm to her or other pt. CM called [facility] & spoke with admin director ( Elieser Canchola @ [phone]) stated that pt declined per  d/t behaviors ( threw a chair @ staff) & that he would check with [facility] with no bed availability. KRISTAN spoke with pt signed choice letter with \"X\" stated he would go to whatever facility that would acept him. Referrals sent via [employer]. KRISTAN also attempted to contact 4 family members listed - messages left for all 4. [id number] @ [phone], Rafiq Bynum  - [phone], & Nayla Jesus @ [phone].

## 2020-12-24 NOTE — ED TRIAGE NOTES
Reported pt picked from a house/ ? Group home for bodyaches but pt has bizarre behavior. OA pt loud - yelling and cursing. After a few minutes  With nurse and ER MD talking to pt he calmed, apologized, and states he \"does not talk to women like this. Please forgive me\". Instructed pt that his apology is accepted.  He agrees to accept meds to relax and to cooperate

## 2020-12-25 PROCEDURE — 74011250637 HC RX REV CODE- 250/637: Performed by: PSYCHIATRY & NEUROLOGY

## 2020-12-25 PROCEDURE — 96372 THER/PROPH/DIAG INJ SC/IM: CPT

## 2020-12-25 PROCEDURE — 74011250636 HC RX REV CODE- 250/636: Performed by: PSYCHIATRY & NEUROLOGY

## 2020-12-25 RX ORDER — GABAPENTIN 300 MG/1
300 CAPSULE ORAL 3 TIMES DAILY
Status: DISCONTINUED | OUTPATIENT
Start: 2020-12-25 | End: 2021-01-13 | Stop reason: HOSPADM

## 2020-12-25 RX ORDER — FLUPHENAZINE DECANOATE 25 MG/ML
25 INJECTION, SOLUTION INTRAMUSCULAR; SUBCUTANEOUS ONCE
Status: COMPLETED | OUTPATIENT
Start: 2020-12-25 | End: 2020-12-25

## 2020-12-25 RX ORDER — FLUOXETINE 10 MG/1
10 CAPSULE ORAL DAILY
Status: DISCONTINUED | OUTPATIENT
Start: 2020-12-25 | End: 2021-01-13 | Stop reason: HOSPADM

## 2020-12-25 RX ORDER — RISPERIDONE 1 MG/1
1 TABLET, FILM COATED ORAL 2 TIMES DAILY
Status: DISCONTINUED | OUTPATIENT
Start: 2020-12-25 | End: 2021-01-13 | Stop reason: HOSPADM

## 2020-12-25 RX ORDER — EPINEPHRINE 0.15 MG/.3ML
INJECTION INTRAMUSCULAR
Status: DISCONTINUED
Start: 2020-12-25 | End: 2020-12-25

## 2020-12-25 RX ADMIN — GABAPENTIN 300 MG: 300 CAPSULE ORAL at 23:06

## 2020-12-25 RX ADMIN — RISPERIDONE 1 MG: 1 TABLET ORAL at 11:43

## 2020-12-25 RX ADMIN — GABAPENTIN 300 MG: 300 CAPSULE ORAL at 17:32

## 2020-12-25 RX ADMIN — FLUOXETINE 10 MG: 10 CAPSULE ORAL at 11:43

## 2020-12-25 RX ADMIN — FLUPHENAZINE DECANOATE 25 MG: 25 INJECTION, SOLUTION INTRAMUSCULAR; SUBCUTANEOUS at 11:48

## 2020-12-25 RX ADMIN — RISPERIDONE 1 MG: 1 TABLET ORAL at 23:06

## 2020-12-25 NOTE — ED NOTES
Pt resting comfortably, alert, no sign or symptoms of pain or discomfort, VS stable, frequent checks, bed in lowest position, side rails up x2, call bell within reach, no further needs at this time.

## 2020-12-25 NOTE — ED NOTES
Assumed care of patient. Awake, alert, and oriented. No acute distress; no respiratory distress. Assisted patient from the bathroom back onto stretcher.

## 2020-12-25 NOTE — ED NOTES
Breakfast tray provided and lunch box. Pt sat in upright position. Pt stated no further needs at this time.

## 2020-12-25 NOTE — ED NOTES
Care assumed and bedside SBAR report endorsed by Zhou Moreno on 12/25/2020, alert and oriented x2, pain currently within manageable limits, plan of care reinforced, bed in lowest position, side rails up x2, call bell within reach, no further needs at this time.

## 2020-12-25 NOTE — ED NOTES
Pt is resting calmly and comfortably in room with lights off Still pending a placement for dispo for patient. VSS.  Continuing to monitor

## 2020-12-25 NOTE — BH NOTES
Edwin Bey 74-year-old -American male with reported history of schizophrenia was consulted for agitation and erratic behavior in the group home. Patient this morning in bed in the ED department. Patient was cooperative on assessment. He presents to shelter. He does state that he was in a lot of pain and was having communicating difficulty expressing himself. He does admit to having getting agitated. He currently denies having any suicidal thoughts homicidal feelings towards anyone. States he had been taking injection in the past which helped him. He does not know when he received his last injection. He denies any command hallucinations to harm self or others. He does not feel paranoid. He does not present or express any persecutory delusions. He states \"they do not want to come back    When asked about his place to return, he states \"they do not want me to come back\" I did things because I was in pain and it was not able to communicate and was frustrated. Past psychiatric history he reports he has had previous psychiatric treatment does not remember much about his medications. He does state he lost his counselor because of his insurance. He agrees to get back on his monthly injection. Review of system no nausea vomiting or chest pain or shortness of breath. He states his body ache has been better today    Assessment plan  Re started home medications  Fluphenazine 25 mg IM scheduled for today which is his home medications for schizoaffective disorder  Does not meet criteria for inpatient psychiatric hospitalization  ED, Case management is continuing to identifying placement.    I believe patient can return to his group home and to place outpatient resources and support for him to address the underlying situations for patient and the group home and the care provider        Scheduled        FLUoxetine (PROzac) capsule 10 mg       10 mg, PO, DAILY       Last Action:  Given, 10 mg at 12/25 1143       gabapentin (NEURONTIN) capsule 300 mg       300 mg, PO, TID       Last Action:  Ordered       risperiDONE (RisperDAL) tablet 1 mg       1 mg, PO, BID       Last Action:  Given, 1 mg at 12/25 1143

## 2020-12-25 NOTE — ED NOTES
Pt cleared both medically and does not meet criteria for admission to [facility]. However, pt does not have anywhere to go, as he is still a psych patient. Benny Vela states he expects we will be working throughout the night to find patient and patient will be waiting.  the patient has had dinner tray and is resting comfortably in bed at this time,

## 2020-12-26 PROBLEM — F25.9 SCHIZOAFFECTIVE DISORDER (HCC): Status: ACTIVE | Noted: 2020-12-26

## 2020-12-26 PROCEDURE — 99218 HC RM OBSERVATION: CPT

## 2020-12-26 PROCEDURE — 74011250637 HC RX REV CODE- 250/637: Performed by: PSYCHIATRY & NEUROLOGY

## 2020-12-26 RX ORDER — ACETAMINOPHEN 650 MG/1
650 SUPPOSITORY RECTAL
Status: DISCONTINUED | OUTPATIENT
Start: 2020-12-26 | End: 2021-01-13 | Stop reason: HOSPADM

## 2020-12-26 RX ORDER — SODIUM CHLORIDE 0.9 % (FLUSH) 0.9 %
5-40 SYRINGE (ML) INJECTION AS NEEDED
Status: DISCONTINUED | OUTPATIENT
Start: 2020-12-26 | End: 2021-01-13 | Stop reason: HOSPADM

## 2020-12-26 RX ORDER — ENOXAPARIN SODIUM 100 MG/ML
40 INJECTION SUBCUTANEOUS DAILY
Status: DISCONTINUED | OUTPATIENT
Start: 2020-12-27 | End: 2021-01-13 | Stop reason: HOSPADM

## 2020-12-26 RX ORDER — ACETAMINOPHEN 325 MG/1
650 TABLET ORAL
Status: DISCONTINUED | OUTPATIENT
Start: 2020-12-26 | End: 2021-01-13 | Stop reason: HOSPADM

## 2020-12-26 RX ORDER — ONDANSETRON 2 MG/ML
4 INJECTION INTRAMUSCULAR; INTRAVENOUS
Status: DISCONTINUED | OUTPATIENT
Start: 2020-12-26 | End: 2021-01-13 | Stop reason: HOSPADM

## 2020-12-26 RX ORDER — PROMETHAZINE HYDROCHLORIDE 25 MG/1
12.5 TABLET ORAL
Status: DISCONTINUED | OUTPATIENT
Start: 2020-12-26 | End: 2021-01-13 | Stop reason: HOSPADM

## 2020-12-26 RX ORDER — SODIUM CHLORIDE 0.9 % (FLUSH) 0.9 %
5-40 SYRINGE (ML) INJECTION EVERY 8 HOURS
Status: DISCONTINUED | OUTPATIENT
Start: 2020-12-26 | End: 2021-01-01

## 2020-12-26 RX ORDER — KETAMINE HYDROCHLORIDE 10 MG/ML
INJECTION, SOLUTION INTRAMUSCULAR; INTRAVENOUS
Status: DISPENSED
Start: 2020-12-26 | End: 2020-12-26

## 2020-12-26 RX ORDER — POLYETHYLENE GLYCOL 3350 17 G/17G
17 POWDER, FOR SOLUTION ORAL DAILY PRN
Status: DISCONTINUED | OUTPATIENT
Start: 2020-12-26 | End: 2021-01-13 | Stop reason: HOSPADM

## 2020-12-26 RX ADMIN — GABAPENTIN 300 MG: 300 CAPSULE ORAL at 08:56

## 2020-12-26 RX ADMIN — Medication 10 ML: at 22:00

## 2020-12-26 RX ADMIN — GABAPENTIN 300 MG: 300 CAPSULE ORAL at 21:52

## 2020-12-26 RX ADMIN — RISPERIDONE 1 MG: 1 TABLET ORAL at 21:52

## 2020-12-26 RX ADMIN — GABAPENTIN 300 MG: 300 CAPSULE ORAL at 17:22

## 2020-12-26 RX ADMIN — FLUOXETINE 10 MG: 10 CAPSULE ORAL at 10:19

## 2020-12-26 RX ADMIN — RISPERIDONE 1 MG: 1 TABLET ORAL at 08:56

## 2020-12-26 NOTE — ED NOTES
Care assumed and bedside SBAR report endorsed by Georgi Yang on 12/26/2020, alert and oriented x3, pain currently within manageable limits, plan of care reinforced, bed in lowest position, side rails up x2, call bell within reach, will continue to monitor.

## 2020-12-26 NOTE — ED NOTES
Received bedside report from April RN. Patient resting comfortably. VSS. Spoke with ED CM who stated they cannot find placement for this patient. Will update charge nurse and supervisor on status of patient.

## 2020-12-26 NOTE — ED NOTES
Contacted case management David Hager ([phone]) to see if she could lead us in a better direction with finding placement for the pt. I informed her of that pt situation and how [facility] was looking into the pt file last night. Howard said she would contact Patrick Palmer this morning around 11 to see what the status is for placement.

## 2020-12-26 NOTE — ED NOTES
Patient given lunchbox and oral fluids. Emptied urinal X2. No complaints or concerns. Patient is watching TV resting.

## 2020-12-26 NOTE — PROGRESS NOTES
Cm communicated with [facility] group home, , [phone]. The group home have bed availability and able to follow the admission process on Monday. Cm will fax documentation ([phone]) to the group home.

## 2020-12-26 NOTE — ED NOTES
Brought the pt a hygiene kit. Set the pt up with a basin of warm water and placed some soap in it. Brought towels and wash cloths and a change of sheets.

## 2020-12-26 NOTE — ED NOTES
Reached out to [id number] to see if they had any information about the pt or have a number for a  or . They did not have any information and suggested that I reach out to [facility] to see if they have any information. Will call later when possible.

## 2020-12-26 NOTE — ED NOTES
Pt resting comfortably, sleeping but easily aroused, no sign or symptoms of pain or discomfort, VS WNL, frequent checks, bed in lowest position, side rails up x1, call bell within reach, no further needs at this time.

## 2020-12-26 NOTE — H&P
History and Physical    Patient: Ben Disla MRN: [id number]  SSN: [id number]    YOB: 1950  Age: 69 y.o. Sex: male      Subjective:      Ben Disla is a 69 y.o. male who presents to the emergency department on December 24, 2020 with violent episodes and a history of schizophrenia and was discharged from the emergency department although his group home would not accept him back due to his violent behavior. For this reason the patient has been boarded in the emergency department with no clear discharge planning. Patient will be admitted for social reasons until adequate placement can be achieved. .     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 arm surgery due to abscess    HX OTHER SURGICAL      flank mole removal      Family History   Problem Relation Age of Onset    Kidney Disease Mother     Diabetes Mother     No Known Problems Father     Heart Disease Sister     Kidney Disease Sister     Diabetes Brother     Heart Disease Brother     Cancer Brother     Alcohol abuse Brother     Obesity Brother     Other Brother         Drugs     Social History     Tobacco Use    Smoking status: Current Every Day Smoker     Packs/day: 0.25    Smokeless tobacco: Never Used   Substance Use Topics    Alcohol use: No      Prior to Admission medications    Medication Sig Start Date End Date Taking?  Authorizing Provider   hydrocortisone (PROCTOSOL HC) 2.5 % rectal cream insert rectally four times a day as needed 10/10/18   Alana Odonnell MD   fluticasone-salmeterol (ADVAIR) 250-50 mcg/dose diskus inhaler Take 1 Puff by inhalation two (2) times a day. 5/6/18   Alana Odonnell MD   ibuprofen (MOTRIN) 800 mg tablet take 1 tablet by mouth twice a day with food 3/27/18   Alana Odonnell MD   trihexyphenidyl (ARTANE) 5 mg tablet Take 1 Tab by mouth three (3) times daily. 3/27/18   MD Serge Slaterk [facility] Needs evaluation to proper type 3/22/18   Alana Odonnell MD   ergocalciferol (ERGOCALCIFEROL) 50,000 unit capsule Take 1 Cap by mouth every seven (7) days. 9/21/17   Alana Odonnell MD   hydroCHLOROthiazide (HYDRODIURIL) 25 mg tablet take 1 tablet by mouth once daily 5/18/17   Alana Odonnell MD   gabapentin (NEURONTIN) 300 mg capsule Take 1 Cap by mouth three (3) times daily. 4/12/17   Alana Odonnell MD   fluPHENAZine decanoate (PROLIXIN) 25 mg/mL injection  10/13/16   Provider, Historical   cholecalciferol (VITAMIN D3) 1,000 unit cap Take  by mouth daily. Provider, Historical   FLUoxetine (PROZAC) 10 mg capsule  3/31/15   Provider, Historical        No Known Allergies    Review of Systems:  Review of Systems   Constitutional: Negative for chills and fever. HENT: Negative. Eyes: Negative. Respiratory: Negative for cough, sputum production and shortness of breath. Cardiovascular: Negative for chest pain and leg swelling. Gastrointestinal: Negative. Negative for abdominal pain, nausea and vomiting. Genitourinary: Negative. Musculoskeletal: Negative. Skin: Negative. Neurological: Negative. Psychiatric/Behavioral: Negative for depression, substance abuse and suicidal ideas. Violent behavior has resolved   All other systems reviewed and are negative. Objective:     No results found for this or any previous visit (from the past 24 hour(s)).      XR CHEST PA LAT   Final Result           Vitals:    12/25/20 2309 12/26/20 0200 12/26/20 0351 12/26/20 0810   BP: 132/71 122/66 123/64 (!) 169/90   Pulse: 70 72 62 67   Resp: 18 18 18 16   Temp: 98.4 °F (36.9 °C)      SpO2: 100% 96% 97% 98%   Weight:       Height:            Physical Exam:  Physical Exam  Vitals signs reviewed. Constitutional:       General: He is not in acute distress. Appearance: He is not ill-appearing. HENT:      Head: Normocephalic and atraumatic. Nose: Nose normal.      Mouth/Throat:      Mouth: Mucous membranes are dry. Eyes:      Conjunctiva/sclera: Conjunctivae normal.   Neck:      Musculoskeletal: Normal range of motion. Cardiovascular:      Rate and Rhythm: Normal rate and regular rhythm. Pulmonary:      Effort: Pulmonary effort is normal.      Breath sounds: Normal breath sounds. Abdominal:      General: Abdomen is flat. Bowel sounds are normal.      Palpations: Abdomen is soft. Musculoskeletal: Normal range of motion. Skin:     General: Skin is warm and dry. Neurological:      General: No focal deficit present. Mental Status: Mental status is at baseline. Psychiatric:      Comments: Patient somewhat lethargic from his medications to control his violent behavior. At this point the patient is pleasant and expresses Adventist concerns. Assessment:     Hospital Problems  Date Reviewed: 12/26/2020    None         Impression:    1. Schizophrenia  2. Violent behavior  3. Schizoaffective disorder  4. HIV, currently not treated  5. History of hypertension    Plan:    1.  Schizoaffective disorder with violent behavior  Group home will not except him back to that facility due to his violent behavior to include throwing chairs at staff members. Case management consult for further placement  Psychiatric consult and patient's medications were renewed, Prozac, gabapentin and Risperdal  Urine drug screen negative    2. History of HIV  Stop medications according to the group home    3.   History of hypertension  Hydroxyzine as needed    CODE STATUS: Full    DVT prophylaxis: Lovenox  Ulcer prophylaxis: Not indicated as patient is tolerating p.o. diet    Patient remains lethargic without a POA. Case management for placement.     Covid negative    Admit under observation    Total time for evaluation 45 minutes    Signed By: Nneka Shine PA-C     December 26, 2020

## 2020-12-26 NOTE — ED NOTES
Nursing supervisor Rober Velasco called group home again and spoke with Adeel Van regarding refusal of patient to come back. Per [place] legal agreement was signed, I have not received payment from anyone, and I cannot care for him. \" States he has been \"refusing care, throwing objects and yelling at other patients. \"  Also stated \"No one is even at the home right now so he can't go there. \"        Nurse writing spoke with niece, Esa Oliveros ([phone]) who stated she has not seen/spoken to her uncle in [facility] year or two\". Stated she did not have room or capability to care for him but was concerned about the situation at hand. She was very helpful in aiding in backgroun information and answering questions since patient is a poor historian. He has no children, no spouse, and not POA. He has been in nursing homes for years, and \"was not even in [place] a year ago, was out of town\" (Unable to say where)    The other numbers listed in the chart are other nieces/nephews who live in [place] who also, have not seen or spoken to him in years as well. Nursing supervisor Rober Velasco and charge nurse aware.

## 2020-12-26 NOTE — ED NOTES
Asked Lachelle Kowalski in case management to see if she could look over the pt notes and information to see if we could possibly figure out where to send the pt. Lachelle Kowalski said that she will look into it and let me know.

## 2020-12-26 NOTE — ED NOTES
Pt resting comfortably, alert, no sign or symptoms of pain or discomfort, VS stable, frequent checks, bed in lowest position, side rails up x2, call bell within reach, Placed on hospital bed, washed up at bedside, no further needs at this time.

## 2020-12-27 PROCEDURE — 74011250636 HC RX REV CODE- 250/636: Performed by: PHYSICIAN ASSISTANT

## 2020-12-27 PROCEDURE — 96372 THER/PROPH/DIAG INJ SC/IM: CPT

## 2020-12-27 PROCEDURE — 74011250637 HC RX REV CODE- 250/637: Performed by: PSYCHIATRY & NEUROLOGY

## 2020-12-27 PROCEDURE — 99218 HC RM OBSERVATION: CPT

## 2020-12-27 PROCEDURE — 74011250637 HC RX REV CODE- 250/637: Performed by: PHYSICIAN ASSISTANT

## 2020-12-27 RX ORDER — AMLODIPINE BESYLATE AND ATORVASTATIN CALCIUM 10; 10 MG/1; MG/1
1 TABLET, FILM COATED ORAL DAILY
Status: ON HOLD | COMMUNITY
End: 2020-12-27

## 2020-12-27 RX ORDER — TRIHEXYPHENIDYL HYDROCHLORIDE 5 MG/1
5 TABLET ORAL 3 TIMES DAILY
Status: CANCELLED | OUTPATIENT
Start: 2020-12-27

## 2020-12-27 RX ORDER — FLUTICASONE PROPIONATE 50 MCG
2 SPRAY, SUSPENSION (ML) NASAL DAILY
COMMUNITY

## 2020-12-27 RX ORDER — HYDROCORTISONE 25 MG/G
CREAM TOPICAL 2 TIMES DAILY
Status: CANCELLED | OUTPATIENT
Start: 2020-12-27

## 2020-12-27 RX ORDER — AMLODIPINE BESYLATE 5 MG/1
5 TABLET ORAL DAILY
Status: DISCONTINUED | OUTPATIENT
Start: 2020-12-27 | End: 2021-01-03

## 2020-12-27 RX ORDER — AMLODIPINE BESYLATE 5 MG/1
10 TABLET ORAL DAILY
Status: CANCELLED | OUTPATIENT
Start: 2020-12-28

## 2020-12-27 RX ORDER — MOMETASONE FUROATE AND FORMOTEROL FUMARATE DIHYDRATE 100; 5 UG/1; UG/1
2 AEROSOL RESPIRATORY (INHALATION) 2 TIMES DAILY
COMMUNITY

## 2020-12-27 RX ORDER — FLUTICASONE PROPIONATE 50 MCG
2 SPRAY, SUSPENSION (ML) NASAL DAILY
Status: CANCELLED | OUTPATIENT
Start: 2020-12-28

## 2020-12-27 RX ORDER — AMLODIPINE BESYLATE 10 MG/1
10 TABLET ORAL DAILY
COMMUNITY

## 2020-12-27 RX ORDER — HYDROCHLOROTHIAZIDE 25 MG/1
25 TABLET ORAL DAILY
Status: CANCELLED | OUTPATIENT
Start: 2020-12-28

## 2020-12-27 RX ADMIN — GABAPENTIN 300 MG: 300 CAPSULE ORAL at 09:13

## 2020-12-27 RX ADMIN — RISPERIDONE 1 MG: 1 TABLET ORAL at 09:13

## 2020-12-27 RX ADMIN — Medication 10 ML: at 21:12

## 2020-12-27 RX ADMIN — GABAPENTIN 300 MG: 300 CAPSULE ORAL at 21:08

## 2020-12-27 RX ADMIN — RISPERIDONE 1 MG: 1 TABLET ORAL at 21:08

## 2020-12-27 RX ADMIN — ENOXAPARIN SODIUM 40 MG: 40 INJECTION SUBCUTANEOUS at 09:13

## 2020-12-27 RX ADMIN — FLUOXETINE 10 MG: 10 CAPSULE ORAL at 09:13

## 2020-12-27 RX ADMIN — Medication 10 ML: at 05:46

## 2020-12-27 RX ADMIN — GABAPENTIN 300 MG: 300 CAPSULE ORAL at 15:44

## 2020-12-27 RX ADMIN — AMLODIPINE BESYLATE 5 MG: 5 TABLET ORAL at 09:15

## 2020-12-27 RX ADMIN — Medication 10 ML: at 13:29

## 2020-12-27 NOTE — PROGRESS NOTES
Hospitalist Progress Note    Subjective:   Daily Progress Note: 2020 8:41 AM    No chest pain or shortness of breath    Current Facility-Administered Medications   Medication Dose Route Frequency    sodium chloride (NS) flush 5-40 mL  5-40 mL IntraVENous Q8H    sodium chloride (NS) flush 5-40 mL  5-40 mL IntraVENous PRN    acetaminophen (TYLENOL) tablet 650 mg  650 mg Oral Q6H PRN    Or    acetaminophen (TYLENOL) suppository 650 mg  650 mg Rectal Q6H PRN    polyethylene glycol (MIRALAX) packet 17 g  17 g Oral DAILY PRN    promethazine (PHENERGAN) tablet 12.5 mg  12.5 mg Oral Q6H PRN    Or    ondansetron (ZOFRAN) injection 4 mg  4 mg IntraVENous Q6H PRN    enoxaparin (LOVENOX) injection 40 mg  40 mg SubCUTAneous DAILY    FLUoxetine (PROzac) capsule 10 mg  10 mg Oral DAILY    gabapentin (NEURONTIN) capsule 300 mg  300 mg Oral TID    risperiDONE (RisperDAL) tablet 1 mg  1 mg Oral BID        Review of Systems  Review of Systems   Constitutional: Negative for chills and fever. HENT: Negative. Eyes: Negative. Respiratory: Negative for cough and shortness of breath. Cardiovascular: Negative for chest pain and leg swelling. Gastrointestinal: Negative for abdominal pain, nausea and vomiting. Genitourinary: Negative. Musculoskeletal: Negative. Skin: Negative. Neurological: Negative. Psychiatric/Behavioral:        Confused            Objective:     Visit Vitals  BP (!) 154/84 (BP 1 Location: Right arm, BP Patient Position: At rest)   Pulse 66   Temp 97.8 °F (36.6 °C)   Resp 18   Ht 5' 6\" (1.676 m)   Wt 66.2 kg (146 lb)   SpO2 100%   BMI 23.57 kg/m²      O2 Device: Room air    Temp (24hrs), Av.8 °F (36.6 °C), Min:97.7 °F (36.5 °C), Max:97.8 °F (36.6 °C)      No intake/output data recorded.  1901 -  0700  In: 480 [P.O.:480]  Out: 550 [Urine:550]    No results found for this or any previous visit (from the past 24 hour(s)).      XR CHEST PA LAT   Final Result PHYSICAL EXAM:    Physical Exam  Vitals signs reviewed. Constitutional:       General: He is not in acute distress. Appearance: He is ill-appearing. HENT:      Head: Normocephalic. Mouth/Throat:      Mouth: Mucous membranes are moist.      Pharynx: Oropharynx is clear. Eyes:      Conjunctiva/sclera: Conjunctivae normal.   Cardiovascular:      Rate and Rhythm: Normal rate and regular rhythm. Pulmonary:      Effort: Pulmonary effort is normal.      Breath sounds: Normal breath sounds. Abdominal:      General: Bowel sounds are normal.      Palpations: Abdomen is soft. Musculoskeletal: Normal range of motion. Skin:     General: Skin is warm. Neurological:      General: No focal deficit present. Mental Status: He is alert. Mental status is at baseline. He is disoriented. Psychiatric:      Comments: Remains confused          Data Review    No results found for this or any previous visit (from the past 24 hour(s)). Assessment/Plan:     Active Problems:    Schizoaffective disorder ([facility] [address] (12/26/2020)      This is a 69-year-old male admitted on 12/24/2020 with a history of schizoaffective disorder and HIV who has recurrent violent behavior and was brought to the emergency department from his group home. His group home refuses to take him back into their facility and case management is working on current placement due to his psychiatric illnesses. Psychiatric consultation performed and patient restarted on Prozac, gabapentin and Risperdal.  He has not taken medications for his HIV in quite some time. Impression:     1.   Schizophrenia  2. Violent behavior  3. Schizoaffective disorder  4. HIV, currently not treated  5. History of hypertension     Plan:     1.  Schizoaffective disorder with violent behavior  Group home will not except him back to that facility due to his violent behavior to include throwing chairs at staff members.   Case management consult for further placement  Psychiatric consult and patient's medications were renewed, Prozac, gabapentin and Risperdal  Urine drug screen negative     2. History of HIV  Stop medications according to the group home     3. History of hypertension  Hydroxyzine as needed  Norvasc daily     CODE STATUS: Full     DVT prophylaxis: Lovenox  Ulcer prophylaxis: Not indicated as patient is tolerating p.o. diet     Patient remains lethargic without a POA. Case management for placement.     Covid negative     Admit under observation    Care Plan discussed with: Patient/Family    Total time spent with patient: 25 minutes.

## 2020-12-27 NOTE — PROGRESS NOTES
Problem: Falls - Risk of  Goal: *Absence of Falls  Description: Document [place] Led Fall Risk and appropriate interventions in the flowsheet.   Outcome: Progressing Towards Goal  Note: Fall Risk Interventions:            Medication Interventions: Teach patient to arise slowly

## 2020-12-27 NOTE — ROUTINE PROCESS
Two nurse skin assessment performed by myself and Mary Manning. Patients skin is clean, dry, and intact with no signs of breakdown noted.

## 2020-12-27 NOTE — ED NOTES
Bedside shift report received from April RN. VS stable, no concerns. Patient resting with no concerns, call bell in reach. A&OX3. No complaints.  Awaiting bed assignment

## 2020-12-27 NOTE — PROGRESS NOTES
Spoke to Gilda Bermudez at [facility] and requested the patient's  UAI, TB test and List of medication on 12/26, still waiting to be faxed to Case management office. The initial referral were faxed ( [phone]) to [facility] [place] on 12/26, to be followed on Monday.

## 2020-12-28 ENCOUNTER — APPOINTMENT (OUTPATIENT)
Dept: CT IMAGING | Age: 70
End: 2020-12-28
Attending: PHYSICIAN ASSISTANT
Payer: MEDICAID

## 2020-12-28 LAB
ALBUMIN SERPL-MCNC: 3.2 G/DL (ref 3.5–5)
ALBUMIN/GLOB SERPL: 0.7 {RATIO} (ref 1.1–2.2)
ALP SERPL-CCNC: 96 U/L (ref 45–117)
ALT SERPL-CCNC: 34 U/L (ref 12–78)
ANION GAP SERPL CALC-SCNC: 5 MMOL/L (ref 5–15)
AST SERPL W P-5'-P-CCNC: 35 U/L (ref 15–37)
BILIRUB SERPL-MCNC: 0.3 MG/DL (ref 0.2–1)
BUN SERPL-MCNC: 15 MG/DL (ref 6–20)
BUN/CREAT SERPL: 19 (ref 12–20)
CA-I BLD-MCNC: 8.6 MG/DL (ref 8.5–10.1)
CHLORIDE SERPL-SCNC: 108 MMOL/L (ref 97–108)
CK SERPL-CCNC: 154 U/L (ref 39–308)
CO2 SERPL-SCNC: 27 MMOL/L (ref 21–32)
CREAT SERPL-MCNC: 0.79 MG/DL (ref 0.7–1.3)
CRP SERPL-MCNC: <0.29 MG/DL (ref 0–0.6)
D DIMER PPP FEU-MCNC: 0.45 UG/ML(FEU)
ERYTHROCYTE [SEDIMENTATION RATE] IN BLOOD: 14 MM/HR
GLOBULIN SER CALC-MCNC: 4.4 G/DL (ref 2–4)
GLUCOSE BLD STRIP.AUTO-MCNC: 94 MG/DL (ref 65–100)
GLUCOSE SERPL-MCNC: 85 MG/DL (ref 65–100)
INR PPP: 1 (ref 0.9–1.1)
MAGNESIUM SERPL-MCNC: 2.1 MG/DL (ref 1.6–2.4)
PERFORMED BY, TECHID: NORMAL
PHOSPHATE SERPL-MCNC: 2.5 MG/DL (ref 2.6–4.7)
POTASSIUM SERPL-SCNC: 3.9 MMOL/L (ref 3.5–5.1)
PROT SERPL-MCNC: 7.6 G/DL (ref 6.4–8.2)
PROTHROMBIN TIME: 13.7 SEC (ref 11.9–14.7)
SODIUM SERPL-SCNC: 140 MMOL/L (ref 136–145)
TROPONIN I SERPL-MCNC: <0.05 NG/ML
TSH SERPL DL<=0.05 MIU/L-ACNC: 1.74 UIU/ML (ref 0.36–3.74)

## 2020-12-28 PROCEDURE — 82550 ASSAY OF CK (CPK): CPT

## 2020-12-28 PROCEDURE — 99218 HC RM OBSERVATION: CPT

## 2020-12-28 PROCEDURE — 85379 FIBRIN DEGRADATION QUANT: CPT

## 2020-12-28 PROCEDURE — 84100 ASSAY OF PHOSPHORUS: CPT

## 2020-12-28 PROCEDURE — 80053 COMPREHEN METABOLIC PANEL: CPT

## 2020-12-28 PROCEDURE — 74011250637 HC RX REV CODE- 250/637: Performed by: PSYCHIATRY & NEUROLOGY

## 2020-12-28 PROCEDURE — 97530 THERAPEUTIC ACTIVITIES: CPT

## 2020-12-28 PROCEDURE — 74011250636 HC RX REV CODE- 250/636: Performed by: PHYSICIAN ASSISTANT

## 2020-12-28 PROCEDURE — 84484 ASSAY OF TROPONIN QUANT: CPT

## 2020-12-28 PROCEDURE — 70450 CT HEAD/BRAIN W/O DYE: CPT

## 2020-12-28 PROCEDURE — 74011250637 HC RX REV CODE- 250/637: Performed by: PHYSICIAN ASSISTANT

## 2020-12-28 PROCEDURE — 97165 OT EVAL LOW COMPLEX 30 MIN: CPT

## 2020-12-28 PROCEDURE — 97161 PT EVAL LOW COMPLEX 20 MIN: CPT

## 2020-12-28 PROCEDURE — 85610 PROTHROMBIN TIME: CPT

## 2020-12-28 PROCEDURE — 82962 GLUCOSE BLOOD TEST: CPT

## 2020-12-28 PROCEDURE — 36415 COLL VENOUS BLD VENIPUNCTURE: CPT

## 2020-12-28 PROCEDURE — 85025 COMPLETE CBC W/AUTO DIFF WBC: CPT

## 2020-12-28 PROCEDURE — 86140 C-REACTIVE PROTEIN: CPT

## 2020-12-28 PROCEDURE — 83735 ASSAY OF MAGNESIUM: CPT

## 2020-12-28 PROCEDURE — 96372 THER/PROPH/DIAG INJ SC/IM: CPT

## 2020-12-28 PROCEDURE — 85652 RBC SED RATE AUTOMATED: CPT

## 2020-12-28 PROCEDURE — 84443 ASSAY THYROID STIM HORMONE: CPT

## 2020-12-28 RX ADMIN — FLUOXETINE 10 MG: 10 CAPSULE ORAL at 08:45

## 2020-12-28 RX ADMIN — RISPERIDONE 1 MG: 1 TABLET ORAL at 08:45

## 2020-12-28 RX ADMIN — GABAPENTIN 300 MG: 300 CAPSULE ORAL at 19:58

## 2020-12-28 RX ADMIN — Medication 10 ML: at 13:11

## 2020-12-28 RX ADMIN — ACETAMINOPHEN 650 MG: 325 TABLET, FILM COATED ORAL at 08:46

## 2020-12-28 RX ADMIN — ENOXAPARIN SODIUM 40 MG: 40 INJECTION SUBCUTANEOUS at 08:45

## 2020-12-28 RX ADMIN — GABAPENTIN 300 MG: 300 CAPSULE ORAL at 16:36

## 2020-12-28 RX ADMIN — Medication 10 ML: at 06:48

## 2020-12-28 RX ADMIN — GABAPENTIN 300 MG: 300 CAPSULE ORAL at 08:45

## 2020-12-28 RX ADMIN — RISPERIDONE 1 MG: 1 TABLET ORAL at 19:59

## 2020-12-28 RX ADMIN — AMLODIPINE BESYLATE 5 MG: 5 TABLET ORAL at 08:45

## 2020-12-28 RX ADMIN — Medication 10 ML: at 19:58

## 2020-12-28 NOTE — PROGRESS NOTES
Problem: Self Care Deficits Care Plan (Adult)  Goal: *Acute Goals and Plan of Care (Insert Text)  Description: Pt will be independence sup<->sit in prep for EOB ADL's  Pt will be supervision LB dressing EOB level  Pt will be independence  sit EOB 10 minutes in prep for EOB ADL's  Pt will be independence  grooming EOB level  Pt will be supervision  sit<-> prep for toilet transfer  Pt will be CGA  BSC/toilet transfer with LRAD  Pt will be minimal assistance toileting/cloth mgmt LRAD  Pt will be contact guard assist  grooming standing sink  Pt will be minimal assistance bathing sitting/standing sink LRAD  Pt will be MI antoni UE HEP in prep for self care tasks      Outcome: Not Met   OCCUPATIONAL THERAPY EVALUATION  Patient: Reuben Rae (68 y.o. male)  Date: 12/28/2020  Primary Diagnosis: Schizoaffective disorder ([facility] [address] [F25.9]        Precautions: fall      ASSESSMENT  Based on the objective data described below, the patient presents initially asleep but easily awoke to name. Pt speech hard to understand at times but oxname bday, month, not year or place. Reoriented pt. Pt w/generalized weakness, decreased sitting and standing balance, on/off confusion, decreased coordination requiring supervision supine<>sit, SBA to CGA sitting EOB d/t posterior lean when lifting legs to don/doff socks. Pt needed min A to don/doff socks d/t decreased sitting balance and decreased  on socks, kept slipping out of hands. Pt also displaying shakiness, jittery on EOB. Pt perform sit<>stand using rw from EOB w/min A and max cues for hand placement on bed and not walker during transfer. Once pt got feet about shoulder width, he could maintain static stand w/ close CGA. Pt took a few steps towards bathroom w/rw w/close CGA but then became very unsteady, shaky, so had pt take steps back to EOB for his safety.  While in room pt did make a statement \"See that cat over there\"  Per pt he uses a rollator and was IND w/his self care, needs clarified, ?poor historian. Was unable to give details where he was from PTA    Current Level of Function Impacting Discharge (ADLs/self-care): decreased sitting/standing balance, decreased safety limiting his functional mob, transfers and self care. Other factors to consider for discharge: support system available, psychiatric h/o. Patient will benefit from skilled therapy intervention to address the above noted impairments. PLAN :  Recommendations and Planned Interventions: self care training, functional mobility training, therapeutic exercise, balance training, therapeutic activities, endurance activities and patient education    Frequency/Duration: Patient will be followed by occupational therapy 5 times a week to address goals. Recommendation for discharge: (in order for the patient to meet his/her long term goals)  SNF    This discharge recommendation:  Has not yet been discussed the attending provider and/or case management           SUBJECTIVE:   Patient stated See that cat over there. 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  arm surgery due to abscess    HX OTHER SURGICAL      flank mole removal       Expanded or extensive additional review of patient history:     Home Situation  Home Environment: (per chart group home)  One/Two Story Residence: One story  Living Alone: No  Support Systems: (group home caregivers)  Patient Expects to be Discharged to[de-identified] Group home  Current DME Used/Available at Home: [employer] rollator    PLOF: Pt IND for ADLS/IADLS, IND w/rollator with mobility prior to admission, per pt        EXAMINATION OF PERFORMANCE DEFICITS:  Cognitive/Behavioral Status:  Neurologic State: Alert;Confused  Orientation Level: Disoriented to place; Disoriented to situation;Disoriented to time;Oriented to person  Cognition: Decreased attention/concentration;Decreased command following;Poor safety awareness        Safety/Judgement: Decreased awareness of environment;Decreased awareness of need for safety;Decreased insight into deficits      Hearing: Auditory  Auditory Impairment: None    Vision/Perceptual:             Corrective Lenses: Glasses    Range of Motion:    AROM: Within functional limits              Strength:    Strength: Generally decreased, functional                Coordination:  Coordination: Generally decreased, functional  Fine Motor Skills-Upper: Left Impaired;Right Impaired         Tone & Sensation:    Tone: Normal  Sensation: Intact                      Balance:  Sitting: Impaired  Sitting - Static: Fair (occasional)  Sitting - Dynamic: Fair (occasional)  Standing: Impaired; With support  Standing - Static: Constant support; Fair    Functional Mobility and Transfers for ADLs:  Bed Mobility:  Supine to Sit: Supervision  Sit to Supine: Supervision    Transfers:  Sit to Stand: Minimum assistance  Stand to Sit: Minimum assistance    ADL Assessment:       Oral Facial Hygiene/Grooming: Supervision;Setup;Minimum assistance                    ADL Intervention and task modifications:       Grooming  Grooming Assistance: Set-up; Supervision;Minimum assistance                   Lower Body Dressing Assistance  Socks: Minimum assistance  Position Performed: Seated edge of bed         Cognitive Retraining  Orientation Retraining: Awareness of environment;Place;Time  Safety/Judgement: Decreased awareness of environment;Decreased awareness of need for safety;Decreased insight into deficits      [facility] AM-PACTM \"6 Clicks\"                                                       Daily Activity Inpatient Short Form  How much help from another person does the patient currently need. .. Total; A Lot A Little None   1. Putting on and taking off regular lower body clothing? []  1 [x]  2 []  3 []  4   2. Bathing (including washing, rinsing, drying)? []  1 [x]  2 []  3 []  4   3. Toileting, which includes using toilet, bedpan or urinal? [] 1 [x]  2 []  3 []  4   4. Putting on and taking off regular upper body clothing? []  1 []  2 [x]  3 []  4   5. Taking care of personal grooming such as brushing teeth? []  1 []  2 [x]  3 []  4   6. Eating meals? []  1 []  2 []  3 [x]  4   © 2007, Trustees of [facility], under license to [employer]. All rights reserved     Score: 16/24     Interpretation of Tool:  Represents clinically-significant functional categories (i.e. Activities of daily living). Percentage of Impairment CH    0%   CI    1-19% CJ    20-39% CK    40-59% CL    60-79% CM    80-99% CN     100%   [facility]  Score 6-24 24 23 20-22 15-19 10-14 7-9 6        Occupational Therapy Evaluation Charge Determination   History Examination Decision-Making   LOW Complexity : Brief history review  LOW Complexity : 1-3 performance deficits relating to physical, cognitive , or psychosocial skils that result in activity limitations and / or participation restrictions  MEDIUM Complexity : Patient may present with comorbidities that affect occupational performnce.  Miniml to moderate modification of tasks or assistance (eg, physical or verbal ) with assesment(s) is necessary to enable patient to complete evaluation       Based on the above components, the patient evaluation is determined to be of the following complexity level: LOW   Pain Rating:  none    Activity Tolerance:   Fair  Please refer to the flowsheet for vital signs taken during this treatment. After treatment patient left in no apparent distress:    Supine in bed, Call bell within reach, Bed / chair alarm activated and Side rails x 3    COMMUNICATION/EDUCATION:   The patients plan of care was discussed with: Registered nurse. Patient/family have participated as able in goal setting and plan of care. This patients plan of care is appropriate for delegation to [facility].     Thank you for this referral.  Jonas Ashraf  Time Calculation: 25 mins

## 2020-12-28 NOTE — PROGRESS NOTES
Problem: Mobility Impaired (Adult and Pediatric)  Goal: *Acute Goals and Plan of Care (Insert Text)  Description: Patient will move from supine to sit and sit to supine , scoot up and down, and roll side to side in bed with modified independence within 7 day(s). Patient will transfer from bed to chair and chair to bed with independence using the least restrictive device within 7 day(s). Patient will improve static standing balance to independence within 1 week(s). Patient will ambulate 100 feet with independence with least restrictive device within 1 weeks. Outcome: Not Met   PHYSICAL THERAPY EVALUATION  Patient: Junaid Nair (1 y.o. male)  Date: 12/28/2020  Primary Diagnosis: Schizoaffective disorder ([facility] [address] [F25.9]        Precautions: fall  ASSESSMENT  Based on the objective data described below,Based on the objective data described below, the patient presents initially asleep but easily awoke to name. Pt speech hard to understand at times but oxname bday, month, not year or place. Reoriented pt. Pt w/generalized weakness, decreased sitting and standing balance, on/off confusion, decreased coordination requiring supervision supine<>sit, Pt also displaying shakiness, jittery on EOB. Pt perform sit<>stand using rw from EOB w/min-mod A . Once pt got feet about shoulder width, he could maintain static stand w/ close CGA. Pt took a few steps towards bathroom w/rw w/close CGA but then became very unsteady, shaky, Per pt he uses a rollator and was IND w/his self care, needs clarified, ?poor historian. Was unable to give details where he was from PTA. patient has been placed on observation but spoke to attanding PA that patient was not able to amb at this time and may need assist upon dc. Current Level of Function Impacting Discharge (mobility/balance): decreased mobility and requires lot of assist.  Other factors to consider for discharge: SNF vs Group home.      Patient will benefit from skilled therapy intervention to address the above noted impairments. PLAN :  Recommendations and Planned Interventions: bed mobility training, transfer training, gait training, therapeutic exercises, patient and family training/education, and therapeutic activities      Frequency/Duration: Patient will be followed by physical therapy:  5 times a week to address goals. Recommendation for discharge: (in order for the patient to meet his/her long term goals)  To be determined: SNF vs HHPT    This discharge recommendation:  Has been made in collaboration with the attending provider and/or case management    IF patient discharges home will need the following DME: rolling walker         SUBJECTIVE:   Patient stated I am fine. I don't have a home. 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  arm surgery due to abscess    HX OTHER SURGICAL      flank mole removal       Personal factors and/or comorbidities impacting plan of care:   Home Situation  Home Environment: Group home  One/Two Story Residence: One story  Living Alone: No  Support Systems: (group home caregivers)  Patient Expects to be Discharged to[de-identified] Group home  Current DME Used/Available at Home: Walker, rolling    EXAMINATION/PRESENTATION/DECISION MAKING:   Critical Behavior:  Neurologic State: Alert, Confused  Orientation Level: Disoriented to place, Disoriented to situation  Cognition: Decreased attention/concentration  Safety/Judgement: Decreased awareness of environment, Decreased awareness of need for assistance  Hearing: Auditory  Auditory Impairment: None  Range Of Motion:  AROM: Within functional limits                       Strength:    Strength: Generally decreased, functional                    Tone & Sensation:   Tone: Normal              Sensation: Intact               Coordination:  Coordination: Generally decreased, functional  Vision:   Corrective Lenses: Glasses  Functional Mobility:  Bed Mobility:     Supine to Sit: Supervision;Contact guard assistance  Sit to Supine: Supervision;Contact guard assistance     Transfers:  Sit to Stand: Minimum assistance  Stand to Sit: Minimum assistance                       Balance:   Sitting: Impaired  Sitting - Static: Fair (occasional)  Sitting - Dynamic: Fair (occasional)  Standing: Impaired; With support  Standing - Static: Constant support; Fair  Standing - Dynamic : Poor;Constant support  Ambulation/Gait Training:                                                    Functional Measure:  [address] [place] [id number] AM-PAC 6 Clicks         Basic Mobility Inpatient Short Form  How much difficulty does the patient currently have. .. Unable A Lot A Little None   1. Turning over in bed (including adjusting bedclothes, sheets and blankets)? [] 1   [] 2   [x] 3   [] 4   2. Sitting down on and standing up from a chair with arms ( e.g., wheelchair, bedside commode, etc.)   [] 1   [x] 2   [] 3   [] 4   3. Moving from lying on back to sitting on the side of the bed? [] 1   [x] 2   [] 3   [] 4          How much help from another person does the patient currently need. .. Total A Lot A Little None   4. Moving to and from a bed to a chair (including a wheelchair)? [] 1   [x] 2   [] 3   [] 4   5. Need to walk in hospital room? [] 1   [x] 2   [] 3   [] 4   6. Climbing 3-5 steps with a railing?    [] 1   [x] 2   [] 3   [] 4   © 2007, Trustees of [address] [place] [address] [address] [address], under license to [employer]. All rights reserved     Score:  Initial:  Most Recent: X (Date: 2020)   Interpretation of Tool:  Represents activities that are increasingly more difficult (i.e. Bed mobility, Transfers, Gait). Score 24 23 22-20 19-15 14-10 9-7 6   Modifier CH CI CJ CK CL CM CN           Physical Therapy Evaluation Charge Determination   History Examination Presentation Decision-Making   LOW Complexity : Zero comorbidities / personal factors that will impact the outcome / POC LOW Complexity : 1-2 Standardized tests and measures addressing body structure, function, activity limitation and / or participation in recreation  MEDIUM Complexity : Evolving with changing characteristics  MEDIUM Complexity : FOTO score of 26-74      Based on the above components, the patient evaluation is determined to be of the following complexity level: MEDIUM    Pain Ratin/10    Activity Tolerance:   Fair  Please refer to the flowsheet for vital signs taken during this treatment. After treatment patient left in no apparent distress:   Supine in bed, Call bell within reach, Bed / chair alarm activated, and Side rails x 3    COMMUNICATION/EDUCATION:   The patients plan of care was discussed with: Occupational therapist and Registered nurse. Fall prevention education was provided and the patient/caregiver indicated understanding., Patient/family have participated as able in goal setting and plan of care. , and Patient/family agree to work toward stated goals and plan of care.     Thank you for this referral.  Edy Jaramillo PT   Time Calculation: 20 mins

## 2020-12-28 NOTE — ROUTINE PROCESS
Bedside shift change report given to [facility] Rebecca (oncoming nurse) by Codey Gallagher (offgoing nurse). Report included the following information SBAR.

## 2020-12-28 NOTE — PROGRESS NOTES
Problem: Falls - Risk of  Goal: *Absence of Falls  Description: Document Lb Michele Fall Risk and appropriate interventions in the flowsheet.   Outcome: Progressing Towards Goal  Note: Fall Risk Interventions:  Mobility Interventions: Bed/chair exit alarm         Medication Interventions: Bed/chair exit alarm, Patient to call before getting OOB, Teach patient to arise slowly    Elimination Interventions: Stay With Me (per policy), Patient to call for help with toileting needs, Call light in reach    History of Falls Interventions: Door open when patient unattended
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
## 2020-12-28 NOTE — PROGRESS NOTES
CM called [facility] [phone]. CM spoke with Angely Lane, inquiring about admission. Angely Lane stated that they are currently still going through the referrals that have came in over the weekend and CM should hear a response by tomorrow 12/29/2020 on whether or not patient can be admitted.

## 2020-12-29 LAB
BASOPHILS # BLD: 0 K/UL (ref 0–0.1)
BASOPHILS NFR BLD: 1 % (ref 0–1)
DIFFERENTIAL METHOD BLD: NORMAL
EOSINOPHIL # BLD: 0.1 K/UL (ref 0–0.4)
EOSINOPHIL NFR BLD: 1 % (ref 0–7)
ERYTHROCYTE [DISTWIDTH] IN BLOOD BY AUTOMATED COUNT: 13.4 % (ref 11.5–14.5)
HCT VFR BLD AUTO: 39.3 % (ref 36.6–50.3)
HGB BLD-MCNC: 12.9 G/DL (ref 12.1–17)
IMM GRANULOCYTES # BLD AUTO: 0 K/UL (ref 0–0.04)
IMM GRANULOCYTES NFR BLD AUTO: 0 % (ref 0–0.5)
LYMPHOCYTES # BLD: 2.3 K/UL (ref 0.8–3.5)
LYMPHOCYTES NFR BLD: 48 % (ref 12–49)
MCH RBC QN AUTO: 30.9 PG (ref 26–34)
MCHC RBC AUTO-ENTMCNC: 32.8 G/DL (ref 30–36.5)
MCV RBC AUTO: 94 FL (ref 80–99)
MONOCYTES # BLD: 0.6 K/UL (ref 0–1)
MONOCYTES NFR BLD: 13 % (ref 5–13)
NEUTS SEG # BLD: 1.8 K/UL (ref 1.8–8)
NEUTS SEG NFR BLD: 37 % (ref 32–75)
PLATELET # BLD AUTO: 263 K/UL (ref 150–400)
PMV BLD AUTO: 11 FL (ref 8.9–12.9)
PROCALCITONIN SERPL-MCNC: <0.05 NG/ML
RBC # BLD AUTO: 4.18 M/UL (ref 4.1–5.7)
WBC # BLD AUTO: 4.7 K/UL (ref 4.1–11.1)

## 2020-12-29 PROCEDURE — 99218 HC RM OBSERVATION: CPT

## 2020-12-29 PROCEDURE — 96372 THER/PROPH/DIAG INJ SC/IM: CPT

## 2020-12-29 PROCEDURE — 36415 COLL VENOUS BLD VENIPUNCTURE: CPT

## 2020-12-29 PROCEDURE — 84145 PROCALCITONIN (PCT): CPT

## 2020-12-29 PROCEDURE — 74011250637 HC RX REV CODE- 250/637: Performed by: PHYSICIAN ASSISTANT

## 2020-12-29 PROCEDURE — 74011250637 HC RX REV CODE- 250/637: Performed by: PSYCHIATRY & NEUROLOGY

## 2020-12-29 PROCEDURE — 97530 THERAPEUTIC ACTIVITIES: CPT

## 2020-12-29 PROCEDURE — 74011250636 HC RX REV CODE- 250/636: Performed by: PHYSICIAN ASSISTANT

## 2020-12-29 RX ADMIN — AMLODIPINE BESYLATE 5 MG: 5 TABLET ORAL at 08:27

## 2020-12-29 RX ADMIN — GABAPENTIN 300 MG: 300 CAPSULE ORAL at 20:10

## 2020-12-29 RX ADMIN — Medication 10 ML: at 05:07

## 2020-12-29 RX ADMIN — GABAPENTIN 300 MG: 300 CAPSULE ORAL at 15:33

## 2020-12-29 RX ADMIN — ENOXAPARIN SODIUM 40 MG: 40 INJECTION SUBCUTANEOUS at 08:28

## 2020-12-29 RX ADMIN — RISPERIDONE 1 MG: 1 TABLET ORAL at 20:10

## 2020-12-29 RX ADMIN — RISPERIDONE 1 MG: 1 TABLET ORAL at 08:27

## 2020-12-29 RX ADMIN — GABAPENTIN 300 MG: 300 CAPSULE ORAL at 08:27

## 2020-12-29 RX ADMIN — Medication 10 ML: at 20:10

## 2020-12-29 RX ADMIN — FLUOXETINE 10 MG: 10 CAPSULE ORAL at 08:27

## 2020-12-29 NOTE — PROGRESS NOTES
PHYSICAL THERAPY TREATMENT  Patient: Hola Ureña (89 y.o. male)  Date: 12/29/2020  Diagnosis: [facility] [F25.9] <principal problem not specified>       Precautions:    Chart, physical therapy assessment, plan of care and goals were reviewed. ASSESSMENT  Patient continues with skilled PT services and is progressing towards goals. Pt. Semi supine in bed upon arrival, soaked in urine. Got SN to get patient a new gown. Has impulsive change of direction when VC during ambulation, sit to stand and stand to sit. Has scissoring gait pattern with fluctuation in gait speed. Tolerated seated LE TE. Recommend DC to SNF. Current Level of Function Impacting Discharge (mobility/balance): Cognition    Other factors to consider for discharge: TBD         PLAN :  Patient continues to benefit from skilled intervention to address the above impairments. Continue treatment per established plan of care. to address goals. Recommendation for discharge: (in order for the patient to meet his/her long term goals)  Therapy up to 5 days/week in SNF setting    This discharge recommendation:  Has been made in collaboration with the attending provider and/or case management    IF patient discharges home will need the following DME: rolling walker       SUBJECTIVE:   Patient stated im okay hi, im glad you are here.     OBJECTIVE DATA SUMMARY:   Critical Behavior:  Neurologic State: Alert  Orientation Level: Oriented to person, Oriented to place, Oriented to situation  Cognition: Decreased attention/concentration, Impulsive, Poor safety awareness  Safety/Judgement: Decreased awareness of environment, Decreased awareness of need for assistance  Functional Mobility Training:  Bed Mobility:  Rolling: Stand-by assistance  Supine to Sit: Stand-by assistance  Sit to Supine: Stand-by assistance  Scooting: Stand-by assistance        Transfers:  Sit to Stand: Minimum assistance  Stand to Sit: Minimum assistance  Stand Pivot Transfers: Contact guard assistance                          Balance:  Sitting: Intact  Sitting - Static: Fair (occasional)  Sitting - Dynamic: Fair (occasional)  Standing: Impaired;Pull to stand; With support  Standing - Static: Constant support; Fair  Standing - Dynamic : Poor;Constant support  Ambulation/Gait Training:  Distance (ft): 100 Feet (ft)     Ambulation - Level of Assistance: Moderate assistance;Contact guard assistance     Gait Description (WDL): Exceptions to WDL  Gait Abnormalities: Scissoring        Base of Support: Narrowed  Stance: Time  Speed/Ginny: Fluctuations  Step Length: Left shortened;Right shortened  Swing Pattern: Left asymmetrical;Right asymmetrical                 Stairs: Therapeutic Exercises:   Therapeutic Exercises:       EXERCISE   Sets   Reps   Active Active Assist   Passive Self ROM   Comments   Ankle Pumps  20 [x] [] [] []    Quad Sets/Glut Sets   [] [] [] []    Hamstring Sets   [] [] [] []    Short Arc Quads   [] [] [] []    Heel Slides   [] [] [] []    Straight Leg Raises   [] [] [] []    Hip abd/add  20 [x] [] [] []    Long Arc Quads  20 [x] [] [] []    Marching  20 [x] [] [] []       [] [] [] []        Pain Ratin    Activity Tolerance:   Fair  Please refer to the flowsheet for vital signs taken during this treatment. After treatment patient left in no apparent distress:   Supine in bed    COMMUNICATION/COLLABORATION:   The patients plan of care was discussed with: Physical therapy assistant.      Anthony Power   Time Calculation: 24 mins

## 2020-12-29 NOTE — PROGRESS NOTES
Hospitalist Progress Note    Subjective:   Daily Progress Note: 2020     No chest pain or shortness of breath. AMS overnight. Work up negative    Current Facility-Administered Medications   Medication Dose Route Frequency    amLODIPine (NORVASC) tablet 5 mg  5 mg Oral DAILY    sodium chloride (NS) flush 5-40 mL  5-40 mL IntraVENous Q8H    sodium chloride (NS) flush 5-40 mL  5-40 mL IntraVENous PRN    acetaminophen (TYLENOL) tablet 650 mg  650 mg Oral Q6H PRN    Or    acetaminophen (TYLENOL) suppository 650 mg  650 mg Rectal Q6H PRN    polyethylene glycol (MIRALAX) packet 17 g  17 g Oral DAILY PRN    promethazine (PHENERGAN) tablet 12.5 mg  12.5 mg Oral Q6H PRN    Or    ondansetron (ZOFRAN) injection 4 mg  4 mg IntraVENous Q6H PRN    enoxaparin (LOVENOX) injection 40 mg  40 mg SubCUTAneous DAILY    FLUoxetine (PROzac) capsule 10 mg  10 mg Oral DAILY    gabapentin (NEURONTIN) capsule 300 mg  300 mg Oral TID    risperiDONE (RisperDAL) tablet 1 mg  1 mg Oral BID        Review of Systems  Review of Systems   Constitutional: Negative for chills and fever. HENT: Negative. Eyes: Negative. Respiratory: Negative for cough and shortness of breath. Cardiovascular: Negative for chest pain and leg swelling. Gastrointestinal: Negative for abdominal pain, nausea and vomiting. Genitourinary: Negative. Musculoskeletal: Negative. Skin: Negative. Neurological: Negative. Psychiatric/Behavioral:        Confused            Objective:     Visit Vitals  BP (!) 145/81 (BP 1 Location: Left arm, BP Patient Position: At rest;Head of bed elevated (Comment degrees))   Pulse 72   Temp 98 °F (36.7 °C)   Resp 18   Ht 5' 6\" (1.676 m)   Wt 66.2 kg (146 lb)   SpO2 100%   BMI 23.57 kg/m²      O2 Device: Room air    Temp (24hrs), Av.4 °F (36.9 °C), Min:98 °F (36.7 °C), Max:98.7 °F (37.1 °C)      No intake/output data recorded. No intake/output data recorded.     Recent Results (from the past 24 hour(s)) GLUCOSE, POC    Collection Time: 12/28/20  8:05 PM   Result Value Ref Range    Glucose (POC) 94 65 - 100 mg/dL    Performed by Zuleyka Barney    TROPONIN I    Collection Time: 12/28/20  8:16 PM   Result Value Ref Range    Troponin-I, Qt. <0.05 <0.05 ng/mL   PROTHROMBIN TIME + INR    Collection Time: 12/28/20  8:16 PM   Result Value Ref Range    Prothrombin time 13.7 11.9 - 14.7 sec    INR 1.0 0.9 - 1.1     C REACTIVE PROTEIN, QT    Collection Time: 12/28/20  8:16 PM   Result Value Ref Range    C-Reactive protein <0.29 0.00 - 0.60 mg/dL   SED RATE, AUTOMATED    Collection Time: 12/28/20  8:16 PM   Result Value Ref Range    Sed rate, automated 14 mm/hr   METABOLIC PANEL, COMPREHENSIVE    Collection Time: 12/28/20  8:16 PM   Result Value Ref Range    Sodium 140 136 - 145 mmol/L    Potassium 3.9 3.5 - 5.1 mmol/L    Chloride 108 97 - 108 mmol/L    CO2 27 21 - 32 mmol/L    Anion gap 5 5 - 15 mmol/L    Glucose 85 65 - 100 mg/dL    BUN 15 6 - 20 mg/dL    Creatinine 0.79 0.70 - 1.30 mg/dL    BUN/Creatinine ratio 19 12 - 20      GFR est AA >60 >60 ml/min/1.73m2    GFR est non-AA >60 >60 ml/min/1.73m2    Calcium 8.6 8.5 - 10.1 mg/dL    Bilirubin, total 0.3 0.2 - 1.0 mg/dL    AST (SGOT) 35 15 - 37 U/L    ALT (SGPT) 34 12 - 78 U/L    Alk.  phosphatase 96 45 - 117 U/L    Protein, total 7.6 6.4 - 8.2 g/dL    Albumin 3.2 (L) 3.5 - 5.0 g/dL    Globulin 4.4 (H) 2.0 - 4.0 g/dL    A-G Ratio 0.7 (L) 1.1 - 2.2     PHOSPHORUS    Collection Time: 12/28/20  8:16 PM   Result Value Ref Range    Phosphorus 2.5 (L) 2.6 - 4.7 mg/dL   MAGNESIUM    Collection Time: 12/28/20  8:16 PM   Result Value Ref Range    Magnesium 2.1 1.6 - 2.4 mg/dL   CBC WITH AUTOMATED DIFF    Collection Time: 12/28/20  8:16 PM   Result Value Ref Range    WBC 4.7 4.1 - 11.1 K/uL    RBC 4.18 4.10 - 5.70 M/uL    HGB 12.9 12.1 - 17.0 g/dL    HCT 39.3 36.6 - 50.3 %    MCV 94.0 80.0 - 99.0 FL    MCH 30.9 26.0 - 34.0 PG    MCHC 32.8 30.0 - 36.5 g/dL    RDW 13.4 11.5 - 14.5 % PLATELET 784 497 - 465 K/uL    MPV 11.0 8.9 - 12.9 FL    NEUTROPHILS 37 32 - 75 %    LYMPHOCYTES 48 12 - 49 %    MONOCYTES 13 5 - 13 %    EOSINOPHILS 1 0 - 7 %    BASOPHILS 1 0 - 1 %    IMMATURE GRANULOCYTES 0 0.0 - 0.5 %    ABS. NEUTROPHILS 1.8 1.8 - 8.0 K/UL    ABS. LYMPHOCYTES 2.3 0.8 - 3.5 K/UL    ABS. MONOCYTES 0.6 0.0 - 1.0 K/UL    ABS. EOSINOPHILS 0.1 0.0 - 0.4 K/UL    ABS. BASOPHILS 0.0 0.0 - 0.1 K/UL    ABS. IMM. GRANS. 0.0 0.00 - 0.04 K/UL    DF AUTOMATED     D DIMER    Collection Time: 12/28/20  8:16 PM   Result Value Ref Range    D DIMER 0.45 <0.50 ug/ml(FEU)   CK    Collection Time: 12/28/20  8:16 PM   Result Value Ref Range     39 - 308 U/L   TSH 3RD GENERATION    Collection Time: 12/28/20  8:16 PM   Result Value Ref Range    TSH 1.74 0.36 - 3.74 uIU/mL   PROCALCITONIN    Collection Time: 12/29/20  5:34 AM   Result Value Ref Range    Procalcitonin <0.05 (H) 0 ng/mL        CT CODE NEURO HEAD WO CONTRAST   Final Result   Impression:   1. No acute intracranial hemorrhage. 2.  Likely chronic lacunar infarct of the right basal ganglia/internal capsule,   new since 12/2/2019.   3.  Chronic pontine lacunar infarcts and evidence of chronic small vessel   ischemic changes. Findings were discussed with the patient's nurse Rola Guerrero RN by Dr. Eligio Hernández at 8:37 PM on  via phone conversation. XR CHEST PA LAT   Final Result           PHYSICAL EXAM:    Physical Exam  Vitals signs reviewed. Constitutional:       General: He is not in acute distress. Appearance: He is ill-appearing. HENT:      Head: Normocephalic. Mouth/Throat:      Mouth: Mucous membranes are moist.      Pharynx: Oropharynx is clear. Eyes:      Conjunctiva/sclera: Conjunctivae normal.   Cardiovascular:      Rate and Rhythm: Normal rate and regular rhythm. Pulmonary:      Effort: Pulmonary effort is normal.      Breath sounds: Normal breath sounds.    Abdominal:      General: Bowel sounds are normal. Palpations: Abdomen is soft. Musculoskeletal: Normal range of motion. Skin:     General: Skin is warm. Neurological:      General: No focal deficit present. Mental Status: He is alert. Mental status is at baseline. He is disoriented. Psychiatric:      Comments: Remains confused          Data Review    Recent Results (from the past 24 hour(s))   GLUCOSE, POC    Collection Time: 12/28/20  8:05 PM   Result Value Ref Range    Glucose (POC) 94 65 - 100 mg/dL    Performed by Sea Tolbert    TROPONIN I    Collection Time: 12/28/20  8:16 PM   Result Value Ref Range    Troponin-I, Qt. <0.05 <0.05 ng/mL   PROTHROMBIN TIME + INR    Collection Time: 12/28/20  8:16 PM   Result Value Ref Range    Prothrombin time 13.7 11.9 - 14.7 sec    INR 1.0 0.9 - 1.1     C REACTIVE PROTEIN, QT    Collection Time: 12/28/20  8:16 PM   Result Value Ref Range    C-Reactive protein <0.29 0.00 - 0.60 mg/dL   SED RATE, AUTOMATED    Collection Time: 12/28/20  8:16 PM   Result Value Ref Range    Sed rate, automated 14 mm/hr   METABOLIC PANEL, COMPREHENSIVE    Collection Time: 12/28/20  8:16 PM   Result Value Ref Range    Sodium 140 136 - 145 mmol/L    Potassium 3.9 3.5 - 5.1 mmol/L    Chloride 108 97 - 108 mmol/L    CO2 27 21 - 32 mmol/L    Anion gap 5 5 - 15 mmol/L    Glucose 85 65 - 100 mg/dL    BUN 15 6 - 20 mg/dL    Creatinine 0.79 0.70 - 1.30 mg/dL    BUN/Creatinine ratio 19 12 - 20      GFR est AA >60 >60 ml/min/1.73m2    GFR est non-AA >60 >60 ml/min/1.73m2    Calcium 8.6 8.5 - 10.1 mg/dL    Bilirubin, total 0.3 0.2 - 1.0 mg/dL    AST (SGOT) 35 15 - 37 U/L    ALT (SGPT) 34 12 - 78 U/L    Alk.  phosphatase 96 45 - 117 U/L    Protein, total 7.6 6.4 - 8.2 g/dL    Albumin 3.2 (L) 3.5 - 5.0 g/dL    Globulin 4.4 (H) 2.0 - 4.0 g/dL    A-G Ratio 0.7 (L) 1.1 - 2.2     PHOSPHORUS    Collection Time: 12/28/20  8:16 PM   Result Value Ref Range    Phosphorus 2.5 (L) 2.6 - 4.7 mg/dL   MAGNESIUM    Collection Time: 12/28/20  8:16 PM Result Value Ref Range    Magnesium 2.1 1.6 - 2.4 mg/dL   CBC WITH AUTOMATED DIFF    Collection Time: 12/28/20  8:16 PM   Result Value Ref Range    WBC 4.7 4.1 - 11.1 K/uL    RBC 4.18 4.10 - 5.70 M/uL    HGB 12.9 12.1 - 17.0 g/dL    HCT 39.3 36.6 - 50.3 %    MCV 94.0 80.0 - 99.0 FL    MCH 30.9 26.0 - 34.0 PG    MCHC 32.8 30.0 - 36.5 g/dL    RDW 13.4 11.5 - 14.5 %    PLATELET 139 689 - 483 K/uL    MPV 11.0 8.9 - 12.9 FL    NEUTROPHILS 37 32 - 75 %    LYMPHOCYTES 48 12 - 49 %    MONOCYTES 13 5 - 13 %    EOSINOPHILS 1 0 - 7 %    BASOPHILS 1 0 - 1 %    IMMATURE GRANULOCYTES 0 0.0 - 0.5 %    ABS. NEUTROPHILS 1.8 1.8 - 8.0 K/UL    ABS. LYMPHOCYTES 2.3 0.8 - 3.5 K/UL    ABS. MONOCYTES 0.6 0.0 - 1.0 K/UL    ABS. EOSINOPHILS 0.1 0.0 - 0.4 K/UL    ABS. BASOPHILS 0.0 0.0 - 0.1 K/UL    ABS. IMM. GRANS. 0.0 0.00 - 0.04 K/UL    DF AUTOMATED     D DIMER    Collection Time: 12/28/20  8:16 PM   Result Value Ref Range    D DIMER 0.45 <0.50 ug/ml(FEU)   CK    Collection Time: 12/28/20  8:16 PM   Result Value Ref Range     39 - 308 U/L   TSH 3RD GENERATION    Collection Time: 12/28/20  8:16 PM   Result Value Ref Range    TSH 1.74 0.36 - 3.74 uIU/mL   PROCALCITONIN    Collection Time: 12/29/20  5:34 AM   Result Value Ref Range    Procalcitonin <0.05 (H) 0 ng/mL        Assessment/Plan:     Active Problems:    Schizoaffective disorder ([facility] [address] (12/26/2020)      This is a 44-year-old male admitted on 12/24/2020 with a history of schizoaffective disorder and HIV who has recurrent violent behavior and was brought to the emergency department from his group home. His group home refuses to take him back into their facility and case management is working on current placement due to his psychiatric illnesses. Psychiatric consultation performed and patient restarted on Prozac, gabapentin and Risperdal.  He has not taken medications for his HIV in quite some time. Awaiting placement. Altered mental status overnight on 12/28/2020.   Stroke alert initiated although symptoms have resolved CT of the head was normal.    Impression:     1.   Schizophrenia  2. Violent behavior  3. Schizoaffective disorder  4. HIV, currently not treated  5. History of hypertension     Plan:     1.  Schizoaffective disorder with violent behavior  Group home will not except him back to that facility due to his violent behavior to include throwing chairs at staff members. Case management consult for further placement  Psychiatric consult and patient's medications were renewed, Prozac, gabapentin and Risperdal  Urine drug screen negative     2. History of HIV  Stop medications according to the group home     3. History of hypertension  Hydroxyzine as needed  Norvasc daily     CODE STATUS: Full     DVT prophylaxis: Lovenox  Ulcer prophylaxis: Not indicated as patient is tolerating p.o. diet     Patient without a POA. Case management for placement.     Covid negative     Admit under observation    Care Plan discussed with: Patient/Family    Total time spent with patient: 22 minutes.

## 2020-12-29 NOTE — PROGRESS NOTES
Problem: Falls - Risk of  Goal: *Absence of Falls  Description: Document Patti Davidson Fall Risk and appropriate interventions in the flowsheet.   Outcome: Progressing Towards Goal  Note: Fall Risk Interventions:  Mobility Interventions: Bed/chair exit alarm, OT consult for ADLs, Patient to call before getting OOB, PT Consult for mobility concerns, Strengthening exercises (ROM-active/passive)    Mentation Interventions: Adequate sleep, hydration, pain control, Bed/chair exit alarm, Increase mobility, More frequent rounding    Medication Interventions: Bed/chair exit alarm, Patient to call before getting OOB, Teach patient to arise slowly    Elimination Interventions: Bed/chair exit alarm, Call light in reach    History of Falls Interventions: Bed/chair exit alarm, Door open when patient unattended

## 2020-12-29 NOTE — CONSULTS
Consult Date: 12/29/2020    Consults Mr. Myra Marquez is a 69year old man who has history of schizophrenia and brought to hospital for violent behavior. Yesterday at 8 pm nurse walked in to find him unresponsive, staring. Code neuro was called. He was responding to pain and vitals stable. He was suctioned to remove secretions. Head ct showed old strokes and diffuse white matter disease. He returned to baseline on his own. 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  arm surgery due to abscess    HX OTHER SURGICAL      flank mole removal     Family History   Problem Relation Age of Onset    Kidney Disease Mother     Diabetes Mother     No Known Problems Father     Heart Disease Sister     Kidney Disease Sister     Diabetes Brother     Heart Disease Brother     Cancer Brother     Alcohol abuse Brother     Obesity Brother     Other Brother         Drugs      Social History     Tobacco Use    Smoking status: Current Every Day Smoker     Packs/day: 0.25    Smokeless tobacco: Never Used   Substance Use Topics    Alcohol use: No       Current Facility-Administered Medications   Medication Dose Route Frequency Provider Last Rate Last Admin    amLODIPine (NORVASC) tablet 5 mg  5 mg Oral DAILY Avila Clayton PA-C   5 mg at 12/29/20 0827    sodium chloride (NS) flush 5-40 mL  5-40 mL IntraVENous Q8H Avila Clayton PA-C   10 mL at 12/29/20 0507    sodium chloride (NS) flush 5-40 mL  5-40 mL IntraVENous PRN Geovanna Clayton PA-C        acetaminophen (TYLENOL) tablet 650 mg  650 mg Oral Q6H PRN Lisseth Foss PA-C   650 mg at 12/28/20 9179    Or    acetaminophen (TYLENOL) suppository 650 mg  650 mg Rectal Q6H PRN Geovanna Clayton PA-C        polyethylene glycol (MIRALAX) packet 17 g  17 g Oral DAILY PRN Geovanna Clayton PA-C        promethazine (PHENERGAN) tablet 12.5 mg  12.5 mg Oral Q6H PRN Geovanna Clayton PA-C        Or    ondansetron (ZOFRAN) injection 4 mg  4 mg IntraVENous Q6H PRN Geovanna Clayton PA-C        enoxaparin (LOVENOX) injection 40 mg  40 mg SubCUTAneous DAILY Geovanna Clayton PA-C   40 mg at 12/29/20 9086    FLUoxetine (PROzac) capsule 10 mg  10 mg Oral DAILY Ephraim Aguilar MD   10 mg at 12/29/20 0827    gabapentin (NEURONTIN) capsule 300 mg  300 mg Oral TID Ephraim Aguilar MD   300 mg at 12/29/20 0827    risperiDONE (RisperDAL) tablet 1 mg  1 mg Oral BID Ephraim Aguilar MD   1 mg at 12/29/20 0827        Review of Systems   All other systems reviewed and are negative. Objective     Vital signs for last 24 hours:  Visit Vitals  BP (!) 145/81 (BP 1 Location: Left arm, BP Patient Position: At rest;Head of bed elevated (Comment degrees))   Pulse 72   Temp 98 °F (36.7 °C)   Resp 18   Ht 5' 6\" (1.676 m)   Wt 66.2 kg (146 lb)   SpO2 100%   BMI 23.57 kg/m²       Intake/Output this shift:  Current Shift: No intake/output data recorded. Last 3 Shifts: No intake/output data recorded.     Data Review:   Recent Results (from the past 24 hour(s))   GLUCOSE, POC    Collection Time: 12/28/20  8:05 PM   Result Value Ref Range    Glucose (POC) 94 65 - 100 mg/dL    Performed by Parris Minaya    TROPONIN I    Collection Time: 12/28/20  8:16 PM   Result Value Ref Range    Troponin-I, Qt. <0.05 <0.05 ng/mL   PROTHROMBIN TIME + INR    Collection Time: 12/28/20  8:16 PM   Result Value Ref Range    Prothrombin time 13.7 11.9 - 14.7 sec    INR 1.0 0.9 - 1.1 C REACTIVE PROTEIN, QT    Collection Time: 12/28/20  8:16 PM   Result Value Ref Range    C-Reactive protein <0.29 0.00 - 0.60 mg/dL   SED RATE, AUTOMATED    Collection Time: 12/28/20  8:16 PM   Result Value Ref Range    Sed rate, automated 14 mm/hr   METABOLIC PANEL, COMPREHENSIVE    Collection Time: 12/28/20  8:16 PM   Result Value Ref Range    Sodium 140 136 - 145 mmol/L    Potassium 3.9 3.5 - 5.1 mmol/L    Chloride 108 97 - 108 mmol/L    CO2 27 21 - 32 mmol/L    Anion gap 5 5 - 15 mmol/L    Glucose 85 65 - 100 mg/dL    BUN 15 6 - 20 mg/dL    Creatinine 0.79 0.70 - 1.30 mg/dL    BUN/Creatinine ratio 19 12 - 20      GFR est AA >60 >60 ml/min/1.73m2    GFR est non-AA >60 >60 ml/min/1.73m2    Calcium 8.6 8.5 - 10.1 mg/dL    Bilirubin, total 0.3 0.2 - 1.0 mg/dL    AST (SGOT) 35 15 - 37 U/L    ALT (SGPT) 34 12 - 78 U/L    Alk. phosphatase 96 45 - 117 U/L    Protein, total 7.6 6.4 - 8.2 g/dL    Albumin 3.2 (L) 3.5 - 5.0 g/dL    Globulin 4.4 (H) 2.0 - 4.0 g/dL    A-G Ratio 0.7 (L) 1.1 - 2.2     PHOSPHORUS    Collection Time: 12/28/20  8:16 PM   Result Value Ref Range    Phosphorus 2.5 (L) 2.6 - 4.7 mg/dL   MAGNESIUM    Collection Time: 12/28/20  8:16 PM   Result Value Ref Range    Magnesium 2.1 1.6 - 2.4 mg/dL   CBC WITH AUTOMATED DIFF    Collection Time: 12/28/20  8:16 PM   Result Value Ref Range    WBC 4.7 4.1 - 11.1 K/uL    RBC 4.18 4.10 - 5.70 M/uL    HGB 12.9 12.1 - 17.0 g/dL    HCT 39.3 36.6 - 50.3 %    MCV 94.0 80.0 - 99.0 FL    MCH 30.9 26.0 - 34.0 PG    MCHC 32.8 30.0 - 36.5 g/dL    RDW 13.4 11.5 - 14.5 %    PLATELET 299 570 - 820 K/uL    MPV 11.0 8.9 - 12.9 FL    NEUTROPHILS 37 32 - 75 %    LYMPHOCYTES 48 12 - 49 %    MONOCYTES 13 5 - 13 %    EOSINOPHILS 1 0 - 7 %    BASOPHILS 1 0 - 1 %    IMMATURE GRANULOCYTES 0 0.0 - 0.5 %    ABS. NEUTROPHILS 1.8 1.8 - 8.0 K/UL    ABS. LYMPHOCYTES 2.3 0.8 - 3.5 K/UL    ABS. MONOCYTES 0.6 0.0 - 1.0 K/UL    ABS. EOSINOPHILS 0.1 0.0 - 0.4 K/UL    ABS.  BASOPHILS 0.0 0.0 - 0.1 K/UL    ABS. IMM. GRANS. 0.0 0.00 - 0.04 K/UL    DF AUTOMATED     D DIMER    Collection Time: 12/28/20  8:16 PM   Result Value Ref Range    D DIMER 0.45 <0.50 ug/ml(FEU)   CK    Collection Time: 12/28/20  8:16 PM   Result Value Ref Range     39 - 308 U/L   TSH 3RD GENERATION    Collection Time: 12/28/20  8:16 PM   Result Value Ref Range    TSH 1.74 0.36 - 3.74 uIU/mL   PROCALCITONIN    Collection Time: 12/29/20  5:34 AM   Result Value Ref Range    Procalcitonin <0.05 (H) 0 ng/mL       Physical Exam    Neuro Physical Exam      General: Well developed, well nourished. Patient in no apparent distress. Neurological Exam:  Mental Status: Awake, alert, oriented x3, normal speech   Cranial Nerves:   Intact visual fields PERRL, EOM's full, no nystagmus, no ptosis. Facial sensation is normal. Facial movement is symmetric. Palate is midline. Normal sternocleidomastoid strength. Tongue is midline. Hearing is intact bilaterally. Motor:  5/5 strength in upper and lower proximal and distal muscles. Normal bulk and tone. Reflexes:   Deep tendon reflexes 2+/4 and symmetrical.   Sensory:   Normal to light touch   Gait:  Not tested    Tremor:   No tremor noted. Cerebellar:  No cerebellar signs present. Babinski:      Down b/l     Assessment and Plan: Mr. Mercy Lugo is a 69year old man with schizophrenia who had an unresponsive episode yesterday which spontaneously resolved. Does not sound like seizure or stroke. Will sign off.

## 2020-12-30 PROCEDURE — 74011250637 HC RX REV CODE- 250/637: Performed by: PSYCHIATRY & NEUROLOGY

## 2020-12-30 PROCEDURE — 97530 THERAPEUTIC ACTIVITIES: CPT

## 2020-12-30 PROCEDURE — 74011250637 HC RX REV CODE- 250/637: Performed by: PHYSICIAN ASSISTANT

## 2020-12-30 PROCEDURE — 99218 HC RM OBSERVATION: CPT

## 2020-12-30 PROCEDURE — 74011250636 HC RX REV CODE- 250/636: Performed by: PHYSICIAN ASSISTANT

## 2020-12-30 PROCEDURE — 96372 THER/PROPH/DIAG INJ SC/IM: CPT

## 2020-12-30 RX ADMIN — Medication 10 ML: at 05:53

## 2020-12-30 RX ADMIN — FLUOXETINE 10 MG: 10 CAPSULE ORAL at 08:44

## 2020-12-30 RX ADMIN — Medication 10 ML: at 14:17

## 2020-12-30 RX ADMIN — ENOXAPARIN SODIUM 40 MG: 40 INJECTION SUBCUTANEOUS at 08:43

## 2020-12-30 RX ADMIN — GABAPENTIN 300 MG: 300 CAPSULE ORAL at 08:44

## 2020-12-30 RX ADMIN — RISPERIDONE 1 MG: 1 TABLET ORAL at 08:44

## 2020-12-30 RX ADMIN — GABAPENTIN 300 MG: 300 CAPSULE ORAL at 21:11

## 2020-12-30 RX ADMIN — GABAPENTIN 300 MG: 300 CAPSULE ORAL at 16:09

## 2020-12-30 RX ADMIN — RISPERIDONE 1 MG: 1 TABLET ORAL at 21:11

## 2020-12-30 RX ADMIN — AMLODIPINE BESYLATE 5 MG: 5 TABLET ORAL at 08:44

## 2020-12-30 NOTE — PROGRESS NOTES
PHYSICAL THERAPY TREATMENT  Patient: Joey Valdivia (34 y.o. male)  Date: 12/30/2020  Diagnosis: [facility] [F25.9] <principal problem not specified>       Precautions:    Chart, physical therapy assessment, plan of care and goals were reviewed. ASSESSMENT  Patient continues with skilled PT services and is progressing towards goals. Pt. Sitting in chair sleeping upon arrival. Agreeable to therapy; and asking a lot of questions. Performed questionable sit to stand transfer and land surfing walk of 5' to return to bed with no leg buckling. Recommend DC to SNF. .     Current Level of Function Impacting Discharge (mobility/balance): POOR LEG STRENGTH, SCISSORING GAIT    Other factors to consider for discharge: COGNITION         PLAN :  Patient continues to benefit from skilled intervention to address the above impairments. Continue treatment per established plan of care. to address goals. Recommendation for discharge: (in order for the patient to meet his/her long term goals)  Therapy up to 5 days/week in SNF setting    This discharge recommendation:  Has been made in collaboration with the attending provider and/or case management    IF patient discharges home will need the following DME: rolling walker       SUBJECTIVE:   Patient stated Komal Hall.     OBJECTIVE DATA SUMMARY:   Critical Behavior:  Neurologic State: Alert, Confused  Orientation Level: Oriented X4  Cognition: Decreased command following, Decreased attention/concentration, Poor safety awareness  Safety/Judgement: Decreased awareness of environment, Decreased awareness of need for assistance  Functional Mobility Training:  Bed Mobility:  Rolling: Stand-by assistance  Supine to Sit: Stand-by assistance  Sit to Supine: Stand-by assistance  Scooting: Stand-by assistance        Transfers:  Sit to Stand: Contact guard assistance  Stand to Sit: Contact guard assistance  Stand Pivot Transfers: Contact guard assistance Balance:  Sitting: Intact  Sitting - Static: Good (unsupported)  Sitting - Dynamic: Fair (occasional); Good (unsupported)  Standing: Impaired;Pull to stand; With support  Standing - Static: Constant support; Fair  Standing - Dynamic : Fair;Constant support  Ambulation/Gait Training:                                                       Stairs: Therapeutic Exercises:   NONE  Pain Ratin    Activity Tolerance:   Fair  Please refer to the flowsheet for vital signs taken during this treatment. After treatment patient left in no apparent distress:   Supine in bed    COMMUNICATION/COLLABORATION:   The patients plan of care was discussed with: Physical therapy assistant.      Kae Betts   Time Calculation: 9 mins

## 2020-12-30 NOTE — PROGRESS NOTES
Hospitalist Progress Note    Subjective:   Daily Progress Note: 12/30/2020 1:23 PM    Hospital Course:  Misa Johnson is a 69 y.o. male who presents to the emergency department on December 24, 2020 with violent episodes and a history of schizophrenia and was discharged from the emergency department although his group home would not accept him back due to his violent behavior. For this reason the patient has been boarded in the emergency department with no clear discharge planning. Patient will be admitted for social reasons until adequate placement can be achieved. .     Subjective: Pt seen in room, no complaints or concerns reported by staff. Current Facility-Administered Medications   Medication Dose Route Frequency    amLODIPine (NORVASC) tablet 5 mg  5 mg Oral DAILY    sodium chloride (NS) flush 5-40 mL  5-40 mL IntraVENous Q8H    sodium chloride (NS) flush 5-40 mL  5-40 mL IntraVENous PRN    acetaminophen (TYLENOL) tablet 650 mg  650 mg Oral Q6H PRN    Or    acetaminophen (TYLENOL) suppository 650 mg  650 mg Rectal Q6H PRN    polyethylene glycol (MIRALAX) packet 17 g  17 g Oral DAILY PRN    promethazine (PHENERGAN) tablet 12.5 mg  12.5 mg Oral Q6H PRN    Or    ondansetron (ZOFRAN) injection 4 mg  4 mg IntraVENous Q6H PRN    enoxaparin (LOVENOX) injection 40 mg  40 mg SubCUTAneous DAILY    FLUoxetine (PROzac) capsule 10 mg  10 mg Oral DAILY    gabapentin (NEURONTIN) capsule 300 mg  300 mg Oral TID    risperiDONE (RisperDAL) tablet 1 mg  1 mg Oral BID        Review of Systems:    Review of Systems   Constitutional: Negative for fever. HENT: Negative for sore throat. Respiratory: Negative for cough and shortness of breath. Cardiovascular: Positive for palpitations. Negative for chest pain. Gastrointestinal: Negative for heartburn and nausea. Genitourinary: Negative for dysuria. Neurological: Negative for dizziness and headaches.         Objective:     Visit Vitals  /88   Pulse 81   Temp 97.9 °F (36.6 °C)   Resp 18   Ht 5' 6\" (1.676 m)   Wt 66.2 kg (146 lb)   SpO2 98%   BMI 23.57 kg/m²      O2 Device: Room air    Temp (24hrs), Av.2 °F (36.8 °C), Min:97.9 °F (36.6 °C), Max:98.5 °F (36.9 °C)       07 -  1900  In: -   Out: 600 [Urine:600]  No intake/output data recorded. PHYSICAL EXAM:    Physical Exam   Constitutional: He appears well-developed. No distress. Cardiovascular: Normal rate, regular rhythm, normal heart sounds and intact distal pulses. Pulmonary/Chest: Effort normal and breath sounds normal.   Abdominal: Soft. Bowel sounds are normal.   Neurological: He is alert. Confused to time, and events   Skin: Skin is warm and dry. Psychiatric: His behavior is normal.   Mood disorder          Data Review    No results found for this or any previous visit (from the past 24 hour(s)). CT CODE NEURO HEAD WO CONTRAST   Final Result   Impression:   1. No acute intracranial hemorrhage. 2.  Likely chronic lacunar infarct of the right basal ganglia/internal capsule,   new since 2019.   3.  Chronic pontine lacunar infarcts and evidence of chronic small vessel   ischemic changes. Findings were discussed with the patient's nurse Aristeo Franco RN by Dr. Abraham Anderson at 8:37 PM on  via phone conversation. XR CHEST PA LAT   Final Result          Active Problems:    Schizoaffective disorder ([facility] [address] (2020)        Assessment/Plan:     1. Schizoaffective disorder- with occasional violent behavior, on risperdone, prozac  Psych following. 2. Hypertension- controlled, on norvasc    3. Discharge plan- CM following, for appropriate placement,  Does not have POA or legal guardianship. 4. Hx of HIV- not on medications.        DVT Prophylaxis: lovenox  Code Status:  Full Code  POA:    Care Plan discussed with:   _____staff nurse__________________________________________________________    Herve Bexar, NP

## 2020-12-30 NOTE — PROGRESS NOTES
Problem: Falls - Risk of  Goal: *Absence of Falls  Description: Document Yana Aiken Fall Risk and appropriate interventions in the flowsheet.   Outcome: Progressing Towards Goal  Note: Fall Risk Interventions:  Mobility Interventions: Bed/chair exit alarm, PT Consult for mobility concerns, Strengthening exercises (ROM-active/passive)    Mentation Interventions: Bed/chair exit alarm, Door open when patient unattended, Room close to nurse's station, Update white board    Medication Interventions: Teach patient to arise slowly, Bed/chair exit alarm    Elimination Interventions: Bed/chair exit alarm, Stay With Me (per policy), Call light in reach    History of Falls Interventions: Bed/chair exit alarm, Door open when patient unattended

## 2020-12-31 LAB
GLUCOSE BLD STRIP.AUTO-MCNC: 102 MG/DL (ref 65–100)
GLUCOSE BLD STRIP.AUTO-MCNC: 195 MG/DL (ref 65–100)
GLUCOSE BLD STRIP.AUTO-MCNC: 96 MG/DL (ref 65–100)
PERFORMED BY, TECHID: ABNORMAL
PERFORMED BY, TECHID: ABNORMAL
PERFORMED BY, TECHID: NORMAL

## 2020-12-31 PROCEDURE — 97530 THERAPEUTIC ACTIVITIES: CPT

## 2020-12-31 PROCEDURE — 74011250637 HC RX REV CODE- 250/637: Performed by: PHYSICIAN ASSISTANT

## 2020-12-31 PROCEDURE — 74011250636 HC RX REV CODE- 250/636: Performed by: PHYSICIAN ASSISTANT

## 2020-12-31 PROCEDURE — 74011250637 HC RX REV CODE- 250/637: Performed by: PSYCHIATRY & NEUROLOGY

## 2020-12-31 PROCEDURE — 82962 GLUCOSE BLOOD TEST: CPT

## 2020-12-31 PROCEDURE — 96372 THER/PROPH/DIAG INJ SC/IM: CPT

## 2020-12-31 PROCEDURE — 99218 HC RM OBSERVATION: CPT

## 2020-12-31 RX ADMIN — ENOXAPARIN SODIUM 40 MG: 40 INJECTION SUBCUTANEOUS at 09:34

## 2020-12-31 RX ADMIN — Medication 10 ML: at 15:47

## 2020-12-31 RX ADMIN — GABAPENTIN 300 MG: 300 CAPSULE ORAL at 15:47

## 2020-12-31 RX ADMIN — GABAPENTIN 300 MG: 300 CAPSULE ORAL at 09:34

## 2020-12-31 RX ADMIN — RISPERIDONE 1 MG: 1 TABLET ORAL at 21:15

## 2020-12-31 RX ADMIN — FLUOXETINE 10 MG: 10 CAPSULE ORAL at 09:34

## 2020-12-31 RX ADMIN — AMLODIPINE BESYLATE 5 MG: 5 TABLET ORAL at 09:34

## 2020-12-31 RX ADMIN — RISPERIDONE 1 MG: 1 TABLET ORAL at 09:34

## 2020-12-31 RX ADMIN — GABAPENTIN 300 MG: 300 CAPSULE ORAL at 21:14

## 2020-12-31 NOTE — PROGRESS NOTES
CM spoke with Toño Hagen from [facility], a group home who is interested in meeting with the patient. Toño Hagen requested [facility] be faxed to her. [facility] sent. She will meet with the patient Monday at 12:30pm.     ADDENDUM:  PT and OT are now recommending SNF. CM will review chart regarding if patient can make his own decisions and then obtain SNF choices.

## 2020-12-31 NOTE — PROGRESS NOTES
Hospitalist Progress Note    Subjective:   Daily Progress Note: 2020     No chest pain or shortness of breath. AMS overnight. Work up negative    Current Facility-Administered Medications   Medication Dose Route Frequency    amLODIPine (NORVASC) tablet 5 mg  5 mg Oral DAILY    sodium chloride (NS) flush 5-40 mL  5-40 mL IntraVENous Q8H    sodium chloride (NS) flush 5-40 mL  5-40 mL IntraVENous PRN    acetaminophen (TYLENOL) tablet 650 mg  650 mg Oral Q6H PRN    Or    acetaminophen (TYLENOL) suppository 650 mg  650 mg Rectal Q6H PRN    polyethylene glycol (MIRALAX) packet 17 g  17 g Oral DAILY PRN    promethazine (PHENERGAN) tablet 12.5 mg  12.5 mg Oral Q6H PRN    Or    ondansetron (ZOFRAN) injection 4 mg  4 mg IntraVENous Q6H PRN    enoxaparin (LOVENOX) injection 40 mg  40 mg SubCUTAneous DAILY    FLUoxetine (PROzac) capsule 10 mg  10 mg Oral DAILY    gabapentin (NEURONTIN) capsule 300 mg  300 mg Oral TID    risperiDONE (RisperDAL) tablet 1 mg  1 mg Oral BID        Review of Systems  Review of Systems   Constitutional: Negative for chills and fever. HENT: Negative. Eyes: Negative. Respiratory: Negative for cough and shortness of breath. Cardiovascular: Negative for chest pain and leg swelling. Gastrointestinal: Negative for abdominal pain, nausea and vomiting. Genitourinary: Negative. Musculoskeletal: Negative. Skin: Negative. Neurological: Negative. Psychiatric/Behavioral:        Confused            Objective:     Visit Vitals  /88 (BP 1 Location: Right arm, BP Patient Position: Sitting)   Pulse 68   Temp 99.1 °F (37.3 °C)   Resp 20   Ht 5' 6\" (1.676 m)   Wt 66.2 kg (146 lb)   SpO2 99%   BMI 23.57 kg/m²      O2 Device: Room air    Temp (24hrs), Av.6 °F (37 °C), Min:98.1 °F (36.7 °C), Max:99.1 °F (37.3 °C)      No intake/output data recorded.    1901 -  0700  In: -   Out: 0491 [Urine:1050]    Recent Results (from the past 24 hour(s))   GLUCOSE, POC Collection Time: 12/31/20  8:07 AM   Result Value Ref Range    Glucose (POC) 96 65 - 100 mg/dL    Performed by Ernestine Linder         CT CODE NEURO HEAD WO CONTRAST   Final Result   Impression:   1. No acute intracranial hemorrhage. 2.  Likely chronic lacunar infarct of the right basal ganglia/internal capsule,   new since 12/2/2019.   3.  Chronic pontine lacunar infarcts and evidence of chronic small vessel   ischemic changes. Findings were discussed with the patient's nurse Shanae Awan RN by Dr. Jose Grant at 8:37 PM on  via phone conversation. XR CHEST PA LAT   Final Result           PHYSICAL EXAM:    Physical Exam  Vitals signs reviewed. Constitutional:       General: He is not in acute distress. Appearance: He is ill-appearing. HENT:      Head: Normocephalic. Mouth/Throat:      Mouth: Mucous membranes are moist.      Pharynx: Oropharynx is clear. Eyes:      Conjunctiva/sclera: Conjunctivae normal.   Cardiovascular:      Rate and Rhythm: Normal rate and regular rhythm. Pulmonary:      Effort: Pulmonary effort is normal.      Breath sounds: Normal breath sounds. Abdominal:      General: Bowel sounds are normal.      Palpations: Abdomen is soft. Musculoskeletal: Normal range of motion. Skin:     General: Skin is warm. Neurological:      General: No focal deficit present. Mental Status: He is alert. Mental status is at baseline. He is disoriented.    Psychiatric:      Comments: Remains confused          Data Review    Recent Results (from the past 24 hour(s))   GLUCOSE, POC    Collection Time: 12/31/20  8:07 AM   Result Value Ref Range    Glucose (POC) 96 65 - 100 mg/dL    Performed by Ernestine Linder         Assessment/Plan:     Active Problems:    Schizoaffective disorder ([facility] [address] (12/26/2020)      This is a 76-year-old male admitted on 12/24/2020 with a history of schizoaffective disorder and HIV who has recurrent violent behavior and was brought to the emergency department from his group home. His group home refuses to take him back into their facility and case management is working on current placement due to his psychiatric illnesses. Psychiatric consultation performed and patient restarted on Prozac, gabapentin and Risperdal.  He has not taken medications for his HIV in quite some time. Awaiting placement. Altered mental status overnight on 12/28/2020. Stroke alert initiated although symptoms have resolved CT of the head was normal.    Impression:     1.   Schizophrenia  2. Violent behavior  3. Schizoaffective disorder  4. HIV, currently not treated  5. History of hypertension     Plan:     1.  Schizoaffective disorder with violent behavior  Group home will not except him back to that facility due to his violent behavior to include throwing chairs at staff members. Case management consult for further placement  Psychiatric consult and patient's medications were renewed, Prozac, gabapentin and Risperdal  Urine drug screen negative     2. History of HIV  Stop medications according to the group home     3. History of hypertension  Hydroxyzine as needed  Norvasc daily     CODE STATUS: Full     DVT prophylaxis: Lovenox  Ulcer prophylaxis: Not indicated as patient is tolerating p.o. diet     Patient without a POA. Case management for placement.     Covid negative     Admit under observation    Care Plan discussed with: Patient/Family    Total time spent with patient: 24 minutes.

## 2021-01-01 LAB
GLUCOSE BLD STRIP.AUTO-MCNC: 152 MG/DL (ref 65–100)
PERFORMED BY, TECHID: ABNORMAL

## 2021-01-01 PROCEDURE — 74011250637 HC RX REV CODE- 250/637: Performed by: PHYSICIAN ASSISTANT

## 2021-01-01 PROCEDURE — 74011250637 HC RX REV CODE- 250/637: Performed by: PSYCHIATRY & NEUROLOGY

## 2021-01-01 PROCEDURE — 99218 HC RM OBSERVATION: CPT

## 2021-01-01 PROCEDURE — 82962 GLUCOSE BLOOD TEST: CPT

## 2021-01-01 RX ADMIN — GABAPENTIN 300 MG: 300 CAPSULE ORAL at 16:22

## 2021-01-01 RX ADMIN — AMLODIPINE BESYLATE 5 MG: 5 TABLET ORAL at 10:15

## 2021-01-01 RX ADMIN — RISPERIDONE 1 MG: 1 TABLET ORAL at 10:15

## 2021-01-01 RX ADMIN — GABAPENTIN 300 MG: 300 CAPSULE ORAL at 20:51

## 2021-01-01 RX ADMIN — FLUOXETINE 10 MG: 10 CAPSULE ORAL at 10:15

## 2021-01-01 RX ADMIN — GABAPENTIN 300 MG: 300 CAPSULE ORAL at 10:15

## 2021-01-01 RX ADMIN — RISPERIDONE 1 MG: 1 TABLET ORAL at 20:51

## 2021-01-01 NOTE — PROGRESS NOTES
Hospitalist Progress Note         Rock Hodgkins, APRN, FNP-C    Daily Progress Note: 1/1/2021      Subjective:   Subjective   Patient seen on f/u alert and confused sitting in chair  Patient requesting to be bathed  Patient yelling out profanity while talking to himself  No acute distress noted at this time    Review of Systems:   Review of Systems   Constitutional: Negative. HENT: Negative. Eyes: Negative. Respiratory: Negative. Cardiovascular: Negative. Gastrointestinal: Negative. Genitourinary: Negative. Musculoskeletal: Negative. Skin: Negative. Neurological: Negative. Endo/Heme/Allergies: Negative. Psychiatric/Behavioral: Negative. Objective:   Objective      Vitals:  Patient Vitals for the past 12 hrs:   Temp Pulse Resp BP SpO2   01/01/21 0849 98.8 °F (37.1 °C) (!) 56 18 (!) 150/59 97 %        Physical Exam:  Physical Exam  Vitals signs and nursing note reviewed. Constitutional:       Appearance: Normal appearance. HENT:      Head: Normocephalic. Nose: Nose normal.      Mouth/Throat:      Mouth: Mucous membranes are moist.   Eyes:      Extraocular Movements: Extraocular movements intact. Neck:      Musculoskeletal: Normal range of motion. Cardiovascular:      Rate and Rhythm: Normal rate and regular rhythm. Pulses: Normal pulses. Heart sounds: Normal heart sounds. Pulmonary:      Effort: Pulmonary effort is normal.      Breath sounds: Normal breath sounds. Abdominal:      General: Bowel sounds are normal.      Palpations: Abdomen is soft. Musculoskeletal: Normal range of motion. Skin:     General: Skin is warm and dry. Capillary Refill: Capillary refill takes less than 2 seconds. Neurological:      Mental Status: He is alert and oriented to person, place, and time.           Lab Results:  Recent Results (from the past 24 hour(s))   GLUCOSE, POC    Collection Time: 12/31/20  4:11 PM   Result Value Ref Range    Glucose (POC) 195 (H) 65 - 100 mg/dL    Performed by Geoffrey Brannon POC    Collection Time: 01/01/21  8:47 AM   Result Value Ref Range    Glucose (POC) 152 (H) 65 - 100 mg/dL    Performed by Linda WAGNER [place])           Diagnostic Images:  CT Results  (Last 48 hours)    None          Current Medications:    Current Facility-Administered Medications:     amLODIPine (NORVASC) tablet 5 mg, 5 mg, Oral, DAILY, Avila Clayton PA-C, 5 mg at 01/01/21 1015    sodium chloride (NS) flush 5-40 mL, 5-40 mL, IntraVENous, PRN, Niya Clayton PA-C    acetaminophen (TYLENOL) tablet 650 mg, 650 mg, Oral, Q6H PRN, 650 mg at 12/28/20 0846 **OR** acetaminophen (TYLENOL) suppository 650 mg, 650 mg, Rectal, Q6H PRN, Niya Clayton PA-C    polyethylene glycol (MIRALAX) packet 17 g, 17 g, Oral, DAILY PRN, Niya Clayton PA-C    promethazine (PHENERGAN) tablet 12.5 mg, 12.5 mg, Oral, Q6H PRN **OR** ondansetron (ZOFRAN) injection 4 mg, 4 mg, IntraVENous, Q6H PRN, Avila Clayton PA-C    enoxaparin (LOVENOX) injection 40 mg, 40 mg, SubCUTAneous, DAILY, Avila Clayton PA-C, 40 mg at 12/31/20 3651    FLUoxetine (PROzac) capsule 10 mg, 10 mg, Oral, DAILY, Juliette Elmore MD, 10 mg at 01/01/21 1015    gabapentin (NEURONTIN) capsule 300 mg, 300 mg, Oral, TID, Juliette Elmore MD, 300 mg at 01/01/21 1015    risperiDONE (RisperDAL) tablet 1 mg, 1 mg, Oral, BID, Juliette Elmore MD, 1 mg at 01/01/21 1015       ASSESSMENT/PLAN:    Schizoaffective disorder with violent behavior  -Group home will not except him back to that facility due to his violent behavior to include throwing chairs at staff members.  -Case management consult for dispo planning  -Defer to psychiatric for management  -Prozac, gabapentin and Risperdal     History of HIV  -Patient stopped taking medications according to the group home  -patient will need outpatient workup and management     History of hypertension  -Continue Norvasc 5mg daily     PLAN:  -Case management consult for dispo   -Prozac, gabapentin and Risperdal  -outpatient f/u for HIV management      Full Code  Lovenox Dvt Prophylaxis  GI Prophylaxis non at this time  Home medications reviewed and reconciled      Above treatment plan reviewed and discussed with patient in detail at bedside, all questions answered.     Care Plan discussed with: interdisciplinary team    Bonnie Kearney NP

## 2021-01-02 PROCEDURE — 74011250637 HC RX REV CODE- 250/637: Performed by: PSYCHIATRY & NEUROLOGY

## 2021-01-02 PROCEDURE — 74011250637 HC RX REV CODE- 250/637: Performed by: PHYSICIAN ASSISTANT

## 2021-01-02 PROCEDURE — 99218 HC RM OBSERVATION: CPT

## 2021-01-02 RX ADMIN — RISPERIDONE 1 MG: 1 TABLET ORAL at 09:51

## 2021-01-02 RX ADMIN — AMLODIPINE BESYLATE 5 MG: 5 TABLET ORAL at 09:51

## 2021-01-02 RX ADMIN — RISPERIDONE 1 MG: 1 TABLET ORAL at 20:32

## 2021-01-02 RX ADMIN — GABAPENTIN 300 MG: 300 CAPSULE ORAL at 20:32

## 2021-01-02 RX ADMIN — GABAPENTIN 300 MG: 300 CAPSULE ORAL at 09:51

## 2021-01-02 RX ADMIN — FLUOXETINE 10 MG: 10 CAPSULE ORAL at 09:51

## 2021-01-02 RX ADMIN — GABAPENTIN 300 MG: 300 CAPSULE ORAL at 16:00

## 2021-01-02 NOTE — PROGRESS NOTES
Hospitalist Progress Note         FELISHA Ram, FNP-C    Daily Progress Note: 1/2/2021      Subjective:   Subjective   Patient seen on f/u alert and laying in bed  No complaints at this time  No acute distress noted at this time    Review of Systems:   Review of Systems   Constitutional: Negative. HENT: Negative. Eyes: Negative. Respiratory: Negative. Cardiovascular: Negative. Gastrointestinal: Negative. Genitourinary: Negative. Musculoskeletal: Negative. Skin: Negative. Neurological: Negative. Endo/Heme/Allergies: Negative. Psychiatric/Behavioral: Negative. Objective:   Objective      Vitals:  Patient Vitals for the past 12 hrs:   Temp Pulse Resp BP SpO2   01/02/21 1052 98 °F (36.7 °C) 68 18 (!) 156/82    01/02/21 0818 98 °F (36.7 °C) 68 18 (!) 156/82 98 %        Physical Exam:  Physical Exam  Vitals signs and nursing note reviewed. Constitutional:       Appearance: Normal appearance. HENT:      Head: Normocephalic. Nose: Nose normal.      Mouth/Throat:      Mouth: Mucous membranes are moist.   Eyes:      Extraocular Movements: Extraocular movements intact. Neck:      Musculoskeletal: Normal range of motion. Cardiovascular:      Rate and Rhythm: Normal rate and regular rhythm. Pulses: Normal pulses. Heart sounds: Normal heart sounds. Pulmonary:      Effort: Pulmonary effort is normal.      Breath sounds: Normal breath sounds. Abdominal:      General: Bowel sounds are normal.      Palpations: Abdomen is soft. Musculoskeletal: Normal range of motion. Skin:     General: Skin is warm and dry. Capillary Refill: Capillary refill takes less than 2 seconds. Neurological:      Mental Status: He is alert and oriented to person, place, and time. Lab Results:  No results found for this or any previous visit (from the past 24 hour(s)).        Diagnostic Images:  CT Results  (Last 48 hours)    None          Current Medications:    Current Facility-Administered Medications:     amLODIPine (NORVASC) tablet 5 mg, 5 mg, Oral, DAILY, Avila Clayton PA-C, 5 mg at 01/02/21 7776    sodium chloride (NS) flush 5-40 mL, 5-40 mL, IntraVENous, PRN, Dylon Clayton PA-C    acetaminophen (TYLENOL) tablet 650 mg, 650 mg, Oral, Q6H PRN, 650 mg at 12/28/20 0846 **OR** acetaminophen (TYLENOL) suppository 650 mg, 650 mg, Rectal, Q6H PRN, Dylon Clayton PA-C    polyethylene glycol (MIRALAX) packet 17 g, 17 g, Oral, DAILY PRN, Dylon Clayton PA-C    promethazine (PHENERGAN) tablet 12.5 mg, 12.5 mg, Oral, Q6H PRN **OR** ondansetron (ZOFRAN) injection 4 mg, 4 mg, IntraVENous, Q6H PRN, Avila Clayton PA-C    enoxaparin (LOVENOX) injection 40 mg, 40 mg, SubCUTAneous, DAILY, Avila Clayton PA-C, 40 mg at 12/31/20 8717    FLUoxetine (PROzac) capsule 10 mg, 10 mg, Oral, DAILY, Juliette Elmore MD, 10 mg at 01/02/21 2608    gabapentin (NEURONTIN) capsule 300 mg, 300 mg, Oral, TID, Juliette Elmore MD, 300 mg at 01/02/21 9878    risperiDONE (RisperDAL) tablet 1 mg, 1 mg, Oral, BID, Juliette Elmore MD, 1 mg at 01/02/21 4755       ASSESSMENT/PLAN:    Schizoaffective disorder with violent behavior  -Group home will not except him back to that facility due to his violent behavior to include throwing chairs at staff members.  -Case management consult for dispo planning  -Defer to psychiatric for management  -Prozac, gabapentin and Risperdal     History of HIV  -Patient stopped taking medications according to the group home  -patient will need outpatient workup and management     History of hypertension  -Continue Norvasc 5mg daily     PLAN:  -Case management consult for dispo   -Prozac, gabapentin and Risperdal  -outpatient f/u for HIV management  -No changes in medical condition      Full Code  Lovenox Dvt Prophylaxis  GI Prophylaxis non at this time  Home medications reviewed and reconciled      Above treatment plan reviewed and discussed with patient in detail at bedside, all questions answered. CM for dispo planning.     Care Plan discussed with: interdisciplinary team    Reji Soto NP

## 2021-01-02 NOTE — PROGRESS NOTES
Walked into patient room for rounding, found patient cleaning self with wipes. Patient yelled and said I had to announce myself before coming into the room. As I walked to obtain the urinal that was full, I noticed the floor was soaking wet. Patient stating it was water and urine and that I clean it up and give him a bath. He was very demanding and aggressive verbally. Patient educated on not speaking to staff in this manor and to call if needing help with going to the bathroom as the wet floor if a fall hazard. Patient showed no interest in the conversation. Walked into patient's room to give medications and found patient naked in chair, clothes in shower with water running. The bathroom floor was soaked with water. Belongings were all over the floor. Patient stated I needed to clean \"this shit\" up. Notified the patient that that language and behavior was inappropriate. I provided the patient with a clean gown, socks, towel and bag to put the belongings in to clean up. Patient refused 2300 vitals. Patient is visually stable and in bed trying to sleep.

## 2021-01-03 LAB
ANION GAP SERPL CALC-SCNC: 6 MMOL/L (ref 5–15)
BASOPHILS # BLD: 0 K/UL (ref 0–0.1)
BASOPHILS NFR BLD: 0 % (ref 0–1)
BUN SERPL-MCNC: 11 MG/DL (ref 6–20)
BUN/CREAT SERPL: 13 (ref 12–20)
CA-I BLD-MCNC: 8.7 MG/DL (ref 8.5–10.1)
CHLORIDE SERPL-SCNC: 107 MMOL/L (ref 97–108)
CO2 SERPL-SCNC: 26 MMOL/L (ref 21–32)
CREAT SERPL-MCNC: 0.84 MG/DL (ref 0.7–1.3)
DIFFERENTIAL METHOD BLD: NORMAL
EOSINOPHIL # BLD: 0.1 K/UL (ref 0–0.4)
EOSINOPHIL NFR BLD: 2 % (ref 0–7)
ERYTHROCYTE [DISTWIDTH] IN BLOOD BY AUTOMATED COUNT: 14.1 % (ref 11.5–14.5)
GLUCOSE SERPL-MCNC: 140 MG/DL (ref 65–100)
HCT VFR BLD AUTO: 38.5 % (ref 36.6–50.3)
HGB BLD-MCNC: 12.8 G/DL (ref 12.1–17)
IMM GRANULOCYTES # BLD AUTO: 0 K/UL (ref 0–0.04)
IMM GRANULOCYTES NFR BLD AUTO: 0 % (ref 0–0.5)
LYMPHOCYTES # BLD: 2.4 K/UL (ref 0.8–3.5)
LYMPHOCYTES NFR BLD: 44 % (ref 12–49)
MCH RBC QN AUTO: 30.8 PG (ref 26–34)
MCHC RBC AUTO-ENTMCNC: 33.2 G/DL (ref 30–36.5)
MCV RBC AUTO: 92.8 FL (ref 80–99)
MONOCYTES # BLD: 0.5 K/UL (ref 0–1)
MONOCYTES NFR BLD: 9 % (ref 5–13)
NEUTS SEG # BLD: 2.4 K/UL (ref 1.8–8)
NEUTS SEG NFR BLD: 45 % (ref 32–75)
PLATELET # BLD AUTO: 294 K/UL (ref 150–400)
PMV BLD AUTO: 11.3 FL (ref 8.9–12.9)
POTASSIUM SERPL-SCNC: 4.1 MMOL/L (ref 3.5–5.1)
RBC # BLD AUTO: 4.15 M/UL (ref 4.1–5.7)
SODIUM SERPL-SCNC: 139 MMOL/L (ref 136–145)
WBC # BLD AUTO: 5.4 K/UL (ref 4.1–11.1)

## 2021-01-03 PROCEDURE — 74011250637 HC RX REV CODE- 250/637: Performed by: PSYCHIATRY & NEUROLOGY

## 2021-01-03 PROCEDURE — 85025 COMPLETE CBC W/AUTO DIFF WBC: CPT

## 2021-01-03 PROCEDURE — 36415 COLL VENOUS BLD VENIPUNCTURE: CPT

## 2021-01-03 PROCEDURE — 80048 BASIC METABOLIC PNL TOTAL CA: CPT

## 2021-01-03 PROCEDURE — 74011250637 HC RX REV CODE- 250/637: Performed by: NURSE PRACTITIONER

## 2021-01-03 PROCEDURE — 99218 HC RM OBSERVATION: CPT

## 2021-01-03 RX ORDER — AMLODIPINE BESYLATE 5 MG/1
10 TABLET ORAL DAILY
Status: DISCONTINUED | OUTPATIENT
Start: 2021-01-03 | End: 2021-01-13 | Stop reason: HOSPADM

## 2021-01-03 RX ADMIN — RISPERIDONE 1 MG: 1 TABLET ORAL at 20:32

## 2021-01-03 RX ADMIN — FLUOXETINE 10 MG: 10 CAPSULE ORAL at 09:00

## 2021-01-03 RX ADMIN — GABAPENTIN 300 MG: 300 CAPSULE ORAL at 09:27

## 2021-01-03 RX ADMIN — GABAPENTIN 300 MG: 300 CAPSULE ORAL at 20:33

## 2021-01-03 RX ADMIN — AMLODIPINE BESYLATE 10 MG: 5 TABLET ORAL at 09:27

## 2021-01-03 RX ADMIN — RISPERIDONE 1 MG: 1 TABLET ORAL at 09:27

## 2021-01-03 NOTE — PROGRESS NOTES
Hospitalist Progress Note         Lidia Carter, APRN, FNP-C    Daily Progress Note: 1/3/2021      Subjective:   Subjective   Patient seen on f/u alert sitting in chair  No complaints at this time  No acute distress noted at this time    Review of Systems:   Review of Systems   Constitutional: Negative. HENT: Negative. Eyes: Negative. Respiratory: Negative. Cardiovascular: Negative. Gastrointestinal: Negative. Genitourinary: Negative. Musculoskeletal: Negative. Skin: Negative. Neurological: Negative. Endo/Heme/Allergies: Negative. Psychiatric/Behavioral: Negative. Objective:   Objective      Vitals:  Patient Vitals for the past 12 hrs:   Temp Pulse Resp BP SpO2   01/03/21 0726 98.2 °F (36.8 °C) 66 18 (!) 152/87 97 %   01/03/21 0010 98.7 °F (37.1 °C) 74 18 (!) 152/79 98 %   01/02/21 2116 98.3 °F (36.8 °C) 65 18 130/71 99 %        Physical Exam:  Physical Exam  Vitals signs and nursing note reviewed. Constitutional:       Appearance: Normal appearance. HENT:      Head: Normocephalic. Nose: Nose normal.      Mouth/Throat:      Mouth: Mucous membranes are moist.   Eyes:      Extraocular Movements: Extraocular movements intact. Neck:      Musculoskeletal: Normal range of motion. Cardiovascular:      Rate and Rhythm: Normal rate and regular rhythm. Pulses: Normal pulses. Heart sounds: Normal heart sounds. Pulmonary:      Effort: Pulmonary effort is normal.      Breath sounds: Normal breath sounds. Abdominal:      General: Bowel sounds are normal.      Palpations: Abdomen is soft. Musculoskeletal: Normal range of motion. Skin:     General: Skin is warm and dry. Capillary Refill: Capillary refill takes less than 2 seconds. Neurological:      Mental Status: He is alert. He is disoriented. Psychiatric:      Comments: Flight of ideas          Lab Results:  No results found for this or any previous visit (from the past 24 hour(s)). Diagnostic Images:  CT Results  (Last 48 hours)    None          Current Medications:    Current Facility-Administered Medications:     amLODIPine (NORVASC) tablet 5 mg, 5 mg, Oral, DAILY, Avila Clayton PA-C, 5 mg at 01/02/21 2797    sodium chloride (NS) flush 5-40 mL, 5-40 mL, IntraVENous, PRN, Sarthak Clayton PA-C    acetaminophen (TYLENOL) tablet 650 mg, 650 mg, Oral, Q6H PRN, 650 mg at 12/28/20 0846 **OR** acetaminophen (TYLENOL) suppository 650 mg, 650 mg, Rectal, Q6H PRN, Reasoner, Sarthak Nine, PA-C    polyethylene glycol (MIRALAX) packet 17 g, 17 g, Oral, DAILY PRN, Sarthak Clayton PA-C    promethazine (PHENERGAN) tablet 12.5 mg, 12.5 mg, Oral, Q6H PRN **OR** ondansetron (ZOFRAN) injection 4 mg, 4 mg, IntraVENous, Q6H PRN, Avila Clayton PA-C    enoxaparin (LOVENOX) injection 40 mg, 40 mg, SubCUTAneous, DAILY, Avila Clyaton PA-C, 40 mg at 12/31/20 8816    FLUoxetine (PROzac) capsule 10 mg, 10 mg, Oral, DAILY, Juliette Elmore MD, 10 mg at 01/02/21 3772    gabapentin (NEURONTIN) capsule 300 mg, 300 mg, Oral, TID, Julietet Elmore MD, 300 mg at 01/02/21 2032    risperiDONE (RisperDAL) tablet 1 mg, 1 mg, Oral, BID, Juliette Elmore MD, 1 mg at 01/02/21 2032       ASSESSMENT/PLAN:    Schizoaffective disorder with violent behavior  -Group home will not except him back to that facility due to his violent behavior to include throwing chairs at staff members.  -Case management consult for dispo planning  -Defer to psychiatric for management  -Prozac, gabapentin and Risperdal     History of HIV  -Patient stopped taking medications according to the group home  -patient will need outpatient workup and management     History of hypertension  -Continue Norvasc 10mg daily     PLAN:  -Case management consult for dispo   -Prozac, gabapentin and Risperdal  -outpatient f/u for HIV management  -No changes in medical condition  -obtain cbc, bmp      Full Code  Lovenox Dvt Prophylaxis  GI Prophylaxis non at this time  Home medications reviewed and reconciled      Above treatment plan reviewed and discussed with patient in detail at bedside, all questions answered. CM for dispo planning.     Care Plan discussed with: interdisciplinary team    Rodrick Leyden, NP

## 2021-01-04 PROCEDURE — 74011250637 HC RX REV CODE- 250/637: Performed by: PSYCHIATRY & NEUROLOGY

## 2021-01-04 PROCEDURE — 74011250636 HC RX REV CODE- 250/636: Performed by: PHYSICIAN ASSISTANT

## 2021-01-04 PROCEDURE — 99218 HC RM OBSERVATION: CPT

## 2021-01-04 PROCEDURE — 97530 THERAPEUTIC ACTIVITIES: CPT

## 2021-01-04 PROCEDURE — 96372 THER/PROPH/DIAG INJ SC/IM: CPT

## 2021-01-04 PROCEDURE — 74011250637 HC RX REV CODE- 250/637: Performed by: NURSE PRACTITIONER

## 2021-01-04 RX ADMIN — RISPERIDONE 1 MG: 1 TABLET ORAL at 10:43

## 2021-01-04 RX ADMIN — GABAPENTIN 300 MG: 300 CAPSULE ORAL at 23:12

## 2021-01-04 RX ADMIN — AMLODIPINE BESYLATE 10 MG: 5 TABLET ORAL at 10:43

## 2021-01-04 RX ADMIN — GABAPENTIN 300 MG: 300 CAPSULE ORAL at 17:31

## 2021-01-04 RX ADMIN — FLUOXETINE 10 MG: 10 CAPSULE ORAL at 10:43

## 2021-01-04 RX ADMIN — GABAPENTIN 300 MG: 300 CAPSULE ORAL at 10:43

## 2021-01-04 RX ADMIN — RISPERIDONE 1 MG: 1 TABLET ORAL at 23:12

## 2021-01-04 RX ADMIN — ENOXAPARIN SODIUM 40 MG: 40 INJECTION SUBCUTANEOUS at 10:43

## 2021-01-04 NOTE — PROGRESS NOTES
PHYSICAL THERAPY TREATMENT: WEEKLY REASSESSMENT  Patient: Gilberto Coe (30 y.o. male)  Date: 1/4/2021  Primary Diagnosis: Schizoaffective disorder ([facility] [address] [F25.9]        Precautions: falls    ASSESSMENT   Pt was initially evaluated by PT on 12/28/20 . Patient received sitting at the EOB  agreeable for PT reassessment . Pt  presents to PT with poor safety awareness , drooling from the mouth ( provided with paper towel ) , balance deficits , generalized weakness and increased assist for functional transfers/mobility . Pt has made progress towards goals since initial PT eval , requires inconsistent  levels of assistance  for transfers ,  Jareth to CGA with cues needed for hand placement and technique  . Pt able to ambulate - 200' with Rw with Jareth , needed 1 seated rest break sec to fatigue , demonstrates scissoring , shuffling gait  with  decreased foot clearance , asymmetrical step length , needs cues for safety , to stay close to the walker , cues to not to  the rolling walker and for proper gait mechanics . Pt with limited carry over for safety . Completed there ex's in sitting position for b/l Le strengthening . Recommend d/c to SNF when medically appropriate . Current Level of Function Impacting Discharge (mobility/balance): requires CGA/Jareth for functional mobility         Other factors to consider for discharge: time since onset , poor safety awareness          PLAN :  Goals have been updated based on progression since last assessment. Patient continues to benefit from skilled intervention to address the above impairments. Recommendations and Planned Interventions: bed mobility training, transfer training, gait training, therapeutic exercises, neuromuscular re-education, patient and family training/education, and therapeutic activities      Frequency/Duration: Patient will be followed by physical therapy:  3 times a week to address goals.     Recommendation for discharge: (in order for the patient to meet his/her long term goals)  Therapy up to 5 days/week in SNF setting    This discharge recommendation:  Has been made in collaboration with the attending provider and/or case management    IF patient discharges home will need the following DME: to be determined (TBD)         SUBJECTIVE:   Patient stated I am alright. 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 arm surgery due to abscess    HX OTHER SURGICAL      flank mole removal       Personal factors and/or comorbidities impacting plan of care:     Home Situation  Home Environment: Group home  One/Two Story Residence: One story  Living Alone: No  Support Systems: (group home caregivers)  Patient Expects to be Discharged to[de-identified] Group home  Current DME Used/Available at Home: Walker, rolling    EXAMINATION/PRESENTATION/DECISION MAKING:   Critical Behavior:  Neurologic State: Alert  Orientation Level: Oriented to person, Oriented to place, Disoriented to situation, Disoriented to time  Cognition: Impaired decision making, Impulsive  Safety/Judgement: Decreased awareness of environment, Decreased awareness of need for assistance  Hearing:   Auditory  Auditory Impairment: None    Functional Mobility:  Bed Mobility:        Sit to Supine: Stand-by assistance  Scooting: Stand-by assistance  Transfers:  Sit to Stand: Contact guard assistance  Stand to Sit: Contact guard assistance    Balance:   Sitting: Intact  Sitting - Static: Fair (occasional)  Sitting - Dynamic: Fair (occasional)  Standing: Impaired; With support  Standing - Static: Constant support; Fair  Standing - Dynamic : Fair;Constant support  Ambulation/Gait Training:  Distance (ft): 200 Feet (ft)     Ambulation - Level of Assistance: Minimal assistance    Gait Abnormalities: Decreased step clearance;Scissoring;Shuffling gait     Base of Support: Narrowed  Stance: Left decreased;Right decreased  Speed/Ginny: Fluctuations     Swing Pattern: Left asymmetrical;Right asymmetrical     Therapeutic Exercises:         EXERCISE   Sets   Reps   Active Active Assist   Passive Self ROM   Comments   Ankle Pumps  15 [x] [] [] []    Quad Sets/Glut Sets   [] [] [] []    Hamstring Sets   [] [] [] []    Short Arc Quads   [] [] [] []    Heel Slides   [] [] [] []    Straight Leg Raises   [] [] [] []    Hip abd/add   [] [] [] []    Long Arc Quads  15 [x] [] [] []    Marching  15 [x] [] [] []       [] [] [] []       Pain Ratin/10    Activity Tolerance:   Fair  Please refer to the flowsheet for vital signs taken during this treatment. After treatment patient left in no apparent distress:   Supine in bed and Call bell within reach    COMMUNICATION/EDUCATION:   The patients plan of care was discussed with: Registered nurse   Fall prevention education was provided and the patient/caregiver indicated understanding. and Patient/family agree to work toward stated goals and plan of care. Problem: Mobility Impaired (Adult and Pediatric)  Goal: *Acute Goals and Plan of Care (Insert Text)  Description: Patient will move from supine to sit and sit to supine , scoot up and down, and roll side to side in bed with modified independence within 7 day(s). Patient will transfer from bed to chair and chair to bed with independence using the least restrictive device within 7 day(s). Patient will improve static standing balance to independence within 1 week(s).   Patient will ambulate 100 feet with independence with least restrictive device within 1 weeks.        Outcome: Progressing Towards Goal       Thank you for this referral.  Aziza Riley   Time Calculation: 24 mins

## 2021-01-04 NOTE — PROGRESS NOTES
Hospitalist Progress Note         FELISHA Schulz, FNP-C    Daily Progress Note: 1/4/2021      Subjective:   Subjective   Patient seen on f/u alert laying ion bed with gloves on  Requesting to be shaved  No complaints at this time  No acute distress noted at this time    Review of Systems:   Review of Systems   Constitutional: Negative. HENT: Negative. Eyes: Negative. Respiratory: Negative. Cardiovascular: Negative. Gastrointestinal: Negative. Genitourinary: Negative. Musculoskeletal: Negative. Skin: Negative. Neurological: Negative. Endo/Heme/Allergies: Negative. Psychiatric/Behavioral: Negative. Objective:   Objective      Vitals:  Patient Vitals for the past 12 hrs:   Temp Pulse Resp SpO2   01/04/21 0803 98.2 °F (36.8 °C) 66 18 100 %        Physical Exam:  Physical Exam  Vitals signs and nursing note reviewed. Constitutional:       Appearance: Normal appearance. HENT:      Head: Normocephalic. Nose: Nose normal.      Mouth/Throat:      Mouth: Mucous membranes are moist.   Eyes:      Extraocular Movements: Extraocular movements intact. Neck:      Musculoskeletal: Normal range of motion. Cardiovascular:      Rate and Rhythm: Normal rate and regular rhythm. Pulses: Normal pulses. Heart sounds: Normal heart sounds. Pulmonary:      Effort: Pulmonary effort is normal.      Breath sounds: Normal breath sounds. Abdominal:      General: Bowel sounds are normal.      Palpations: Abdomen is soft. Musculoskeletal: Normal range of motion. Skin:     General: Skin is warm and dry. Capillary Refill: Capillary refill takes less than 2 seconds. Neurological:      Mental Status: He is alert. He is disoriented.    Psychiatric:      Comments: Flight of ideas          Lab Results:  Recent Results (from the past 24 hour(s))   CBC WITH AUTOMATED DIFF    Collection Time: 01/03/21  1:00 PM   Result Value Ref Range    WBC 5.4 4.1 - 11.1 K/uL    RBC 4.15 4.10 - 5.70 M/uL    HGB 12.8 12.1 - 17.0 g/dL    HCT 38.5 36.6 - 50.3 %    MCV 92.8 80.0 - 99.0 FL    MCH 30.8 26.0 - 34.0 PG    MCHC 33.2 30.0 - 36.5 g/dL    RDW 14.1 11.5 - 14.5 %    PLATELET 281 854 - 649 K/uL    MPV 11.3 8.9 - 12.9 FL    NEUTROPHILS 45 32 - 75 %    LYMPHOCYTES 44 12 - 49 %    MONOCYTES 9 5 - 13 %    EOSINOPHILS 2 0 - 7 %    BASOPHILS 0 0 - 1 %    IMMATURE GRANULOCYTES 0 0.0 - 0.5 %    ABS. NEUTROPHILS 2.4 1.8 - 8.0 K/UL    ABS. LYMPHOCYTES 2.4 0.8 - 3.5 K/UL    ABS. MONOCYTES 0.5 0.0 - 1.0 K/UL    ABS. EOSINOPHILS 0.1 0.0 - 0.4 K/UL    ABS. BASOPHILS 0.0 0.0 - 0.1 K/UL    ABS. IMM.  GRANS. 0.0 0.00 - 0.04 K/UL    DF AUTOMATED     METABOLIC PANEL, BASIC    Collection Time: 01/03/21  1:00 PM   Result Value Ref Range    Sodium 139 136 - 145 mmol/L    Potassium 4.1 3.5 - 5.1 mmol/L    Chloride 107 97 - 108 mmol/L    CO2 26 21 - 32 mmol/L    Anion gap 6 5 - 15 mmol/L    Glucose 140 (H) 65 - 100 mg/dL    BUN 11 6 - 20 mg/dL    Creatinine 0.84 0.70 - 1.30 mg/dL    BUN/Creatinine ratio 13 12 - 20      GFR est AA >60 >60 ml/min/1.73m2    GFR est non-AA >60 >60 ml/min/1.73m2    Calcium 8.7 8.5 - 10.1 mg/dL          Diagnostic Images:  CT Results  (Last 48 hours)    None          Current Medications:    Current Facility-Administered Medications:     amLODIPine (NORVASC) tablet 10 mg, 10 mg, Oral, DAILY, Yodit Garcia NP, 10 mg at 01/04/21 1043    sodium chloride (NS) flush 5-40 mL, 5-40 mL, IntraVENous, PRN, Simon Clayton, PA-C    acetaminophen (TYLENOL) tablet 650 mg, 650 mg, Oral, Q6H PRN, 650 mg at 12/28/20 0846 **OR** acetaminophen (TYLENOL) suppository 650 mg, 650 mg, Rectal, Q6H PRN, Simon Clayton PA-C    polyethylene glycol (MIRALAX) packet 17 g, 17 g, Oral, DAILY PRN, Simon Clayton PA-C    promethazine (PHENERGAN) tablet 12.5 mg, 12.5 mg, Oral, Q6H PRN **OR** ondansetron (ZOFRAN) injection 4 mg, 4 mg, IntraVENous, Q6H PRN, Simon Clayton PA-C    enoxaparin (LOVENOX) injection 40 mg, 40 mg, SubCUTAneous, DAILY, Avila Clayton PA-C, 40 mg at 01/04/21 1043    FLUoxetine (PROzac) capsule 10 mg, 10 mg, Oral, DAILY, Juliette Elmore MD, 10 mg at 01/04/21 1043    gabapentin (NEURONTIN) capsule 300 mg, 300 mg, Oral, TID, Juliette Elmore MD, 300 mg at 01/04/21 1043    risperiDONE (RisperDAL) tablet 1 mg, 1 mg, Oral, BID, Juliette Elmore MD, 1 mg at 01/04/21 1043       ASSESSMENT/PLAN:    Schizoaffective disorder with violent behavior  -Group home will not except him back to that facility due to his violent behavior to include throwing chairs at staff members.  -Case management consult for dispo planning  -Defer to psychiatric for management  -Prozac, gabapentin and Risperdal     History of HIV  -Patient stopped taking medications according to the group home  -patient will need outpatient workup and management     History of hypertension  -Continue Norvasc 10mg daily     PLAN:  -Case management consult for dispo   -Prozac, gabapentin and Risperdal  -outpatient f/u for HIV management  -No changes in medical condition  -labs reviewed, unremarkable      Full Code  Lovenox Dvt Prophylaxis  GI Prophylaxis non at this time  Home medications reviewed and reconciled      Above treatment plan reviewed and discussed with patient in detail at bedside, all questions answered. CM for dispo planning.     Care Plan discussed with: interdisciplinary team    Thad Durán NP

## 2021-01-04 NOTE — PROGRESS NOTES
Problem: Falls - Risk of  Goal: *Absence of Falls  Description: Document [place]o Reason Fall Risk and appropriate interventions in the flowsheet.   Outcome: Progressing Towards Goal  Note: Fall Risk Interventions:  Mobility Interventions: OT consult for ADLs, PT Consult for mobility concerns, PT Consult for assist device competence    Mentation Interventions: Bed/chair exit alarm    Medication Interventions: Bed/chair exit alarm    Elimination Interventions: Bed/chair exit alarm    History of Falls Interventions: Bed/chair exit alarm

## 2021-01-04 NOTE — PROGRESS NOTES
Problem: Falls - Risk of  Goal: *Absence of Falls  Description: Document Josue Burks Fall Risk and appropriate interventions in the flowsheet.   Outcome: Progressing Towards Goal  Note: Fall Risk Interventions:  Mobility Interventions: Bed/chair exit alarm, OT consult for ADLs, PT Consult for mobility concerns    Mentation Interventions: Adequate sleep, hydration, pain control, Bed/chair exit alarm, Room close to nurse's station, More frequent rounding    Medication Interventions: Bed/chair exit alarm, Patient to call before getting OOB    Elimination Interventions: Bed/chair exit alarm, Call light in reach, Urinal in reach    History of Falls Interventions: Bed/chair exit alarm         Problem: Patient Education: Go to Patient Education Activity  Goal: Patient/Family Education  Outcome: Progressing Towards Goal     Problem: Discharge Planning  Goal: *Discharge to safe environment  Outcome: Progressing Towards Goal  Goal: *Knowledge of medication management  Outcome: Progressing Towards Goal  Goal: *Knowledge of discharge instructions  Outcome: Progressing Towards Goal     Problem: Patient Education: Go to Patient Education Activity  Goal: Patient/Family Education  Outcome: Progressing Towards Goal     Problem: Altered Thought Process (Adult/Pediatric)  Goal: *STG: Participates in treatment plan  Outcome: Progressing Towards Goal  Goal: *STG: Remains safe in hospital  Outcome: Progressing Towards Goal  Goal: *STG: Seeks staff when feelings of anxiety and fear arise  Outcome: Progressing Towards Goal  Goal: *STG: Complies with medication therapy  Outcome: Progressing Towards Goal  Goal: *STG: Attends activities and groups  Outcome: Progressing Towards Goal  Goal: *STG: Decreased delusional thinking  Outcome: Progressing Towards Goal  Goal: *STG: Decreased hallucinations  Outcome: Progressing Towards Goal  Goal: *STG: Absence of lethality  Outcome: Progressing Towards Goal  Goal: *STG: Demonstrates ability to understand and use improved judgment in daily activities and relationships  Outcome: Progressing Towards Goal  Goal: *LTG: Returns to baseline functioning  Outcome: Progressing Towards Goal  Goal: Interventions  Outcome: Progressing Towards Goal     Problem: Patient Education: Go to Patient Education Activity  Goal: Patient/Family Education  Outcome: Progressing Towards Goal     Problem: Pressure Injury - Risk of  Goal: *Prevention of pressure injury  Description: Document Tres Scale and appropriate interventions in the flowsheet.   Outcome: Progressing Towards Goal  Note: Pressure Injury Interventions:  Sensory Interventions: Assess changes in LOC, Minimize linen layers, Maintain/enhance activity level, Monitor skin under medical devices    Moisture Interventions: Absorbent underpads, Apply protective barrier, creams and emollients, Maintain skin hydration (lotion/cream), Minimize layers    Activity Interventions: Increase time out of bed, PT/OT evaluation    Mobility Interventions: PT/OT evaluation, HOB 30 degrees or less    Nutrition Interventions: Document food/fluid/supplement intake, Offer support with meals,snacks and hydration    Friction and Shear Interventions: Apply protective barrier, creams and emollients, Minimize layers, HOB 30 degrees or less                Problem: Patient Education: Go to Patient Education Activity  Goal: Patient/Family Education  Outcome: Progressing Towards Goal     Problem: Patient Education: Go to Patient Education Activity  Goal: Patient/Family Education  Outcome: Progressing Towards Goal     Problem: Patient Education: Go to Patient Education Activity  Goal: Patient/Family Education  Outcome: Progressing Towards Goal     Problem: Patient Education: Go to Patient Education Activity  Goal: Patient/Family Education  Outcome: Progressing Towards Goal     Problem: Patient Education: Go to Patient Education Activity  Goal: Patient/Family Education  Outcome: Progressing Towards Goal

## 2021-01-05 PROCEDURE — 82962 GLUCOSE BLOOD TEST: CPT

## 2021-01-05 PROCEDURE — 97530 THERAPEUTIC ACTIVITIES: CPT

## 2021-01-05 PROCEDURE — 99218 HC RM OBSERVATION: CPT

## 2021-01-05 PROCEDURE — 96372 THER/PROPH/DIAG INJ SC/IM: CPT

## 2021-01-05 PROCEDURE — 74011250637 HC RX REV CODE- 250/637: Performed by: PSYCHIATRY & NEUROLOGY

## 2021-01-05 PROCEDURE — 74011250636 HC RX REV CODE- 250/636: Performed by: PHYSICIAN ASSISTANT

## 2021-01-05 PROCEDURE — 74011250637 HC RX REV CODE- 250/637: Performed by: NURSE PRACTITIONER

## 2021-01-05 RX ADMIN — RISPERIDONE 1 MG: 1 TABLET ORAL at 21:31

## 2021-01-05 RX ADMIN — GABAPENTIN 300 MG: 300 CAPSULE ORAL at 16:01

## 2021-01-05 RX ADMIN — ENOXAPARIN SODIUM 40 MG: 40 INJECTION SUBCUTANEOUS at 08:54

## 2021-01-05 RX ADMIN — FLUOXETINE 10 MG: 10 CAPSULE ORAL at 08:54

## 2021-01-05 RX ADMIN — GABAPENTIN 300 MG: 300 CAPSULE ORAL at 21:31

## 2021-01-05 RX ADMIN — AMLODIPINE BESYLATE 10 MG: 5 TABLET ORAL at 08:54

## 2021-01-05 RX ADMIN — GABAPENTIN 300 MG: 300 CAPSULE ORAL at 08:54

## 2021-01-05 RX ADMIN — RISPERIDONE 1 MG: 1 TABLET ORAL at 08:54

## 2021-01-05 NOTE — PROGRESS NOTES
CM reviewed chart this morning. [facility]ha owner came to see patient yesterday and after reviewing his clinical information and meeting with patient at bedside, she has declined for admission to their group home. CM still awaiting responses from SNFs in the area as well, Clinical updates have been uploaded to [address] for SNF review.

## 2021-01-05 NOTE — PROGRESS NOTES
Problem: Self Care Deficits Care Plan (Adult)  Goal: *Acute Goals and Plan of Care (Insert Text)  Description: Pt will be independence sup<->sit in prep for EOB ADL's  Pt will be supervision LB dressing EOB level  Pt will be independence  sit EOB 10 minutes in prep for EOB ADL's  Pt will be independence  grooming EOB level  Pt will be supervision  sit<-> prep for toilet transfer  Pt will be CGA  BSC/toilet transfer with LRAD  Pt will be minimal assistance toileting/cloth mgmt LRAD  Pt will be contact guard assist  grooming standing sink  Pt will be minimal assistance bathing sitting/standing sink LRAD  Pt will be MI antoni UE HEP in prep for self care tasks      Outcome: Progressing Towards Goal   OCCUPATIONAL THERAPY TREATMENT/WEEKLY RE-ASSESSMENT  Patient: Ray White (88 y.o. male)  Date: 1/5/2021  Diagnosis: Schizoaffective disorder ([facility] [address] [F25.9] Chronic hepatitis C virus infection ([facility] [address]       Precautions:    Chart, occupational therapy assessment, plan of care, and goals were reviewed. ASSESSMENT  Patient continues with skilled OT services and is progressing towards goals. Pt seen this am for cont  OT. Pt found in chair, fully dressed, including winter coat. Pt found eating w/hands, saying he didn't have any utensils,asking where his utensils were, that he couldn't see anything. Retrieved pt fork from inside napkin, held it out to him and he took it. Pt reporting he is blind but was making eye contact  w/therapist and looking up at clock and dry erase board, reading \"Happy Wei Seal"  that was written on it. Pt oxname, bday, place, month, year. Reporting back/neck pain but not rating. Pt initially reluctant to allow therapist to apply gait belt but w/explanation agreed. Pt sit<>stand from chair using rw w/ CGA, pt moves quick, impulsive. Pt walking to bathroom forward flexed, walker too far out in front of him, increase unsteadiness.  When attempted to give pt safety prompts, he appeared to get agitated and lifted walker up in air and continued to walk towards bathroom, flailing walker around. Was able to get pt to calm down and performed toilet xfer also w/CGA. To help decrease risk of pt becoming agitated again, had him come back out to chair for simple grooming w/setup to supervision. Pt has made progress towards goals and improved functional status since last seen  12/28/20 for initial eval but continues w/decreased safety awareness, impulsive, decreased balance limiting his functional mob, transfers and self care. Will cont to follow approx 2x/wk to help prevent further functional decline in hospital and cont to progress towards goals. Pt requires 24/7 supervision and REC SNF when ready for d/c              PLAN :  Goals have been updated based on progression since last assessment. Patient continues to benefit from skilled intervention to address the above impairments. Continue to follow patient 2 times a week to address goals. Recommend next OT session: standing balance/standing ADL    Recommendation for discharge: (in order for the patient to meet his/her long term goals)  Therapy up to 5 days/week in SNF setting    This discharge recommendation:  Has been made in collaboration with the attending provider and/or case management           SUBJECTIVE:   Patient stated you can't rush me.     OBJECTIVE DATA SUMMARY:   Cognitive/Behavioral Status:  Neurologic State: Alert  Orientation Level: Oriented to person;Oriented to place;Oriented to time  Cognition: Decreased attention/concentration; Impulsive;Poor safety awareness        Safety/Judgement: Decreased insight into deficits    Functional Mobility and Transfers for ADLs:  Bed Mobility:       Transfers:  Sit to Stand: Stand-by assistance;Contact guard assistance  Functional Transfers  Bathroom Mobility: Contact guard assistance  Toilet Transfer : Contact guard assistance       Balance:  Standing: Impaired; With support  Standing - Static: Fair    ADL Intervention:  Feeding  Feeding Assistance: Modified independent; Set-up    Grooming  Grooming Assistance: Modified independent; Set-up; Supervision  Position Performed: Seated in chair                   Cognitive Retraining  Safety/Judgement: Decreased insight into deficits      Pain:  Back,  Neck but did not rate    Activity Tolerance:   Fair  Please refer to the flowsheet for vital signs taken during this treatment. After treatment patient left in no apparent distress:   Sitting in chair and Call bell within reach    COMMUNICATION/COLLABORATION:   The patients plan of care was discussed with: Registered nurse.      Teresa Alaniz  Time Calculation: 23 mins

## 2021-01-05 NOTE — PROGRESS NOTES
Hospitalist Progress Note    Subjective:   Daily Progress Note: 2021 4:16 PM    Hospital Course:    Subjective:Pt seen in room, pt is conversant, confused to time and place,  No hostile behavior noted , no overnight concerns reported by staff. Current Facility-Administered Medications   Medication Dose Route Frequency    amLODIPine (NORVASC) tablet 10 mg  10 mg Oral DAILY    sodium chloride (NS) flush 5-40 mL  5-40 mL IntraVENous PRN    acetaminophen (TYLENOL) tablet 650 mg  650 mg Oral Q6H PRN    Or    acetaminophen (TYLENOL) suppository 650 mg  650 mg Rectal Q6H PRN    polyethylene glycol (MIRALAX) packet 17 g  17 g Oral DAILY PRN    promethazine (PHENERGAN) tablet 12.5 mg  12.5 mg Oral Q6H PRN    Or    ondansetron (ZOFRAN) injection 4 mg  4 mg IntraVENous Q6H PRN    enoxaparin (LOVENOX) injection 40 mg  40 mg SubCUTAneous DAILY    FLUoxetine (PROzac) capsule 10 mg  10 mg Oral DAILY    gabapentin (NEURONTIN) capsule 300 mg  300 mg Oral TID    risperiDONE (RisperDAL) tablet 1 mg  1 mg Oral BID        Review of Systems:    Review of Systems   Constitutional: Negative for chills and fever. HENT: Negative for sore throat. Respiratory: Negative for cough and sputum production. Cardiovascular: Negative for chest pain and palpitations. Gastrointestinal: Negative for heartburn and nausea. Genitourinary: Negative for dysuria and urgency. Objective:     Visit Vitals  BP (!) 149/81   Pulse 70   Temp 98.3 °F (36.8 °C)   Resp 20   Ht 5' 6\" (1.676 m)   Wt 66.2 kg (146 lb)   SpO2 100%   BMI 23.57 kg/m²      O2 Device: Room air    Temp (24hrs), Av.5 °F (36.9 °C), Min:98.3 °F (36.8 °C), Max:98.6 °F (37 °C)      No intake/output data recorded. No intake/output data recorded. PHYSICAL EXAM:    Physical Exam   Constitutional: He appears well-developed. No distress. Cardiovascular: Normal rate, regular rhythm, normal heart sounds and intact distal pulses.    Pulmonary/Chest: Effort normal and breath sounds normal.   Abdominal: Soft. Bowel sounds are normal.   Neurological: He is alert. Confused to time, and events   Skin: Skin is warm and dry. Psychiatric: His behavior is normal.   Mood disorder     Data Review    No results found for this or any previous visit (from the past 24 hour(s)). CT CODE NEURO HEAD WO CONTRAST   Final Result   Impression:   1. No acute intracranial hemorrhage. 2.  Likely chronic lacunar infarct of the right basal ganglia/internal capsule,   new since 12/2/2019.   3.  Chronic pontine lacunar infarcts and evidence of chronic small vessel   ischemic changes. Findings were discussed with the patient's nurse Deborah Gonzalez RN by Dr. Joy Nathan at 8:37 PM on  via phone conversation. XR CHEST PA LAT   Final Result          Principal Problem:    Chronic hepatitis C virus infection ([facility] (4/7/2011)    Active Problems:    Schizoaffective disorder ([facility] (12/26/2020)        Assessment/Plan:     1. Schizoaffective disorder- with occasional violent behavior, on risperdone, prozac  Psych following.     2. Hypertension- controlled, continue norvasc     3. Discharge plan- CM following, for appropriate placement,  Does not have POA or legal guardianship.     4.  Hx of HIV- not on medications.             DVT Prophylaxis: lovenox  Code Status:  Full Code  POA:    Care Plan discussed with:   _____staff nurse__________________________________________________________    Flor Live NP

## 2021-01-06 LAB
GLUCOSE BLD STRIP.AUTO-MCNC: 110 MG/DL (ref 65–100)
GLUCOSE BLD STRIP.AUTO-MCNC: 118 MG/DL (ref 65–100)
GLUCOSE BLD STRIP.AUTO-MCNC: 127 MG/DL (ref 65–100)
GLUCOSE BLD STRIP.AUTO-MCNC: 170 MG/DL (ref 65–100)
PERFORMED BY, TECHID: ABNORMAL

## 2021-01-06 PROCEDURE — 74011250637 HC RX REV CODE- 250/637: Performed by: PSYCHIATRY & NEUROLOGY

## 2021-01-06 PROCEDURE — 99218 HC RM OBSERVATION: CPT

## 2021-01-06 PROCEDURE — 74011250637 HC RX REV CODE- 250/637: Performed by: NURSE PRACTITIONER

## 2021-01-06 PROCEDURE — 74011250636 HC RX REV CODE- 250/636: Performed by: PHYSICIAN ASSISTANT

## 2021-01-06 PROCEDURE — 96372 THER/PROPH/DIAG INJ SC/IM: CPT

## 2021-01-06 RX ADMIN — GABAPENTIN 300 MG: 300 CAPSULE ORAL at 09:33

## 2021-01-06 RX ADMIN — AMLODIPINE BESYLATE 10 MG: 5 TABLET ORAL at 09:33

## 2021-01-06 RX ADMIN — ENOXAPARIN SODIUM 40 MG: 40 INJECTION SUBCUTANEOUS at 09:33

## 2021-01-06 RX ADMIN — RISPERIDONE 1 MG: 1 TABLET ORAL at 09:33

## 2021-01-06 RX ADMIN — GABAPENTIN 300 MG: 300 CAPSULE ORAL at 20:44

## 2021-01-06 RX ADMIN — FLUOXETINE 10 MG: 10 CAPSULE ORAL at 09:33

## 2021-01-06 RX ADMIN — RISPERIDONE 1 MG: 1 TABLET ORAL at 20:44

## 2021-01-06 RX ADMIN — GABAPENTIN 300 MG: 300 CAPSULE ORAL at 16:08

## 2021-01-06 NOTE — ROUTINE PROCESS
LOS skin assessment performed with Pastor Conner RN. Skin is warm, dry, and intact with no signs of breakdown noted.

## 2021-01-06 NOTE — PROGRESS NOTES
Problem: Falls - Risk of  Goal: *Absence of Falls  Description: Document Aleena Peterson Fall Risk and appropriate interventions in the flowsheet.   Outcome: Progressing Towards Goal  Note: Fall Risk Interventions:  Mobility Interventions: Bed/chair exit alarm    Mentation Interventions: Adequate sleep, hydration, pain control, Bed/chair exit alarm, Room close to nurse's station, More frequent rounding    Medication Interventions: Bed/chair exit alarm, Patient to call before getting OOB    Elimination Interventions: Bed/chair exit alarm, Call light in reach, Urinal in reach    History of Falls Interventions: Bed/chair exit alarm, Room close to nurse's station
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
## 2021-01-07 LAB
GLUCOSE BLD STRIP.AUTO-MCNC: 150 MG/DL (ref 65–100)
PERFORMED BY, TECHID: ABNORMAL

## 2021-01-07 PROCEDURE — 74011250636 HC RX REV CODE- 250/636: Performed by: PHYSICIAN ASSISTANT

## 2021-01-07 PROCEDURE — 97530 THERAPEUTIC ACTIVITIES: CPT

## 2021-01-07 PROCEDURE — 74011250637 HC RX REV CODE- 250/637: Performed by: PSYCHIATRY & NEUROLOGY

## 2021-01-07 PROCEDURE — 99218 HC RM OBSERVATION: CPT

## 2021-01-07 PROCEDURE — 82962 GLUCOSE BLOOD TEST: CPT

## 2021-01-07 PROCEDURE — 74011250636 HC RX REV CODE- 250/636: Performed by: PSYCHIATRY & NEUROLOGY

## 2021-01-07 PROCEDURE — 96372 THER/PROPH/DIAG INJ SC/IM: CPT

## 2021-01-07 PROCEDURE — 74011250637 HC RX REV CODE- 250/637: Performed by: NURSE PRACTITIONER

## 2021-01-07 RX ORDER — FLUPHENAZINE DECANOATE 25 MG/ML
12.5 INJECTION, SOLUTION INTRAMUSCULAR; SUBCUTANEOUS ONCE
Status: COMPLETED | OUTPATIENT
Start: 2021-01-07 | End: 2021-01-07

## 2021-01-07 RX ADMIN — RISPERIDONE 1 MG: 1 TABLET ORAL at 20:54

## 2021-01-07 RX ADMIN — GABAPENTIN 300 MG: 300 CAPSULE ORAL at 16:04

## 2021-01-07 RX ADMIN — GABAPENTIN 300 MG: 300 CAPSULE ORAL at 09:56

## 2021-01-07 RX ADMIN — FLUPHENAZINE DECANOATE 12.5 MG: 25 INJECTION, SOLUTION INTRAMUSCULAR; SUBCUTANEOUS at 16:05

## 2021-01-07 RX ADMIN — AMLODIPINE BESYLATE 10 MG: 5 TABLET ORAL at 09:56

## 2021-01-07 RX ADMIN — FLUOXETINE 10 MG: 10 CAPSULE ORAL at 09:56

## 2021-01-07 RX ADMIN — GABAPENTIN 300 MG: 300 CAPSULE ORAL at 20:54

## 2021-01-07 RX ADMIN — RISPERIDONE 1 MG: 1 TABLET ORAL at 09:56

## 2021-01-07 RX ADMIN — ENOXAPARIN SODIUM 40 MG: 40 INJECTION SUBCUTANEOUS at 09:59
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
## 2021-01-07 NOTE — PROGRESS NOTES
Owner for [facility] group home Laurita Tobin [phone] came to see Mr. Willow Noel. First impression went well, they would like to see patient again. They will be here on Tuesday at 4PM to re-eval patient. CM team obtained patients income information. Currently patient has no rep payee, he makes $400 a month, Dec and Jan Checks are at [employer]. CM will continue to follow case.
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
## 2021-01-07 NOTE — PROGRESS NOTES
Bedside and Verbal shift change report given to Onelia (oncoming nurse) by Mora Cortes (offgoing nurse). Report included the following information SBAR, Intake/Output, MAR and Recent Results.

## 2021-01-07 NOTE — PROGRESS NOTES
Patient is still pending SNF acceptance at this time. Alternative dispo would be group home. CM has coordinated a face-to-face assessment scheduled for today at 1pm with a Group home, Owner is Papi Poon [phone]. CM will continue to follow case. Barrier currently to discharge has been his behaviors and outburst.  Patient to stay in observation status as he does not meet inpatient criteria.

## 2021-01-08 PROCEDURE — 74011250636 HC RX REV CODE- 250/636: Performed by: PHYSICIAN ASSISTANT

## 2021-01-08 PROCEDURE — 99218 HC RM OBSERVATION: CPT

## 2021-01-08 PROCEDURE — 74011250637 HC RX REV CODE- 250/637: Performed by: NURSE PRACTITIONER

## 2021-01-08 PROCEDURE — 74011250637 HC RX REV CODE- 250/637: Performed by: PSYCHIATRY & NEUROLOGY

## 2021-01-08 PROCEDURE — 82962 GLUCOSE BLOOD TEST: CPT

## 2021-01-08 PROCEDURE — 96372 THER/PROPH/DIAG INJ SC/IM: CPT

## 2021-01-08 RX ADMIN — GABAPENTIN 300 MG: 300 CAPSULE ORAL at 21:20

## 2021-01-08 RX ADMIN — RISPERIDONE 1 MG: 1 TABLET ORAL at 10:11

## 2021-01-08 RX ADMIN — FLUOXETINE 10 MG: 10 CAPSULE ORAL at 10:11

## 2021-01-08 RX ADMIN — GABAPENTIN 300 MG: 300 CAPSULE ORAL at 10:11

## 2021-01-08 RX ADMIN — ENOXAPARIN SODIUM 40 MG: 40 INJECTION SUBCUTANEOUS at 10:12

## 2021-01-08 RX ADMIN — GABAPENTIN 300 MG: 300 CAPSULE ORAL at 15:27

## 2021-01-08 RX ADMIN — AMLODIPINE BESYLATE 10 MG: 5 TABLET ORAL at 10:11

## 2021-01-08 RX ADMIN — RISPERIDONE 1 MG: 1 TABLET ORAL at 21:20

## 2021-01-08 NOTE — BH NOTES
Patient seen for follow-up he is sitting in the chair alert verbal less rambling less visual hallucination he says he may be going back to the same group home he came from he is compliant with medications his blood pressure today is 144/73 pulse 74 he has less visual hallucination today.   Says he may be leaving soon thank you end of dictation
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
## 2021-01-08 NOTE — PROGRESS NOTES
CM reviewed chart this morning. Patient is a placement issue. We are awaiting group home determination for admission at this time. Patient will be re-evaled on Tuesday 1/12/2021 at 4pm (This is the earliest the group home could come and reevaluate). In the interim, CM will continue search for alternative placement. Currently no accepting SNF.

## 2021-01-09 PROCEDURE — 74011250637 HC RX REV CODE- 250/637: Performed by: PSYCHIATRY & NEUROLOGY

## 2021-01-09 PROCEDURE — 74011250637 HC RX REV CODE- 250/637: Performed by: NURSE PRACTITIONER

## 2021-01-09 PROCEDURE — 99218 HC RM OBSERVATION: CPT

## 2021-01-09 RX ADMIN — GABAPENTIN 300 MG: 300 CAPSULE ORAL at 18:05

## 2021-01-09 RX ADMIN — RISPERIDONE 1 MG: 1 TABLET ORAL at 08:35

## 2021-01-09 RX ADMIN — FLUOXETINE 10 MG: 10 CAPSULE ORAL at 08:35

## 2021-01-09 RX ADMIN — RISPERIDONE 1 MG: 1 TABLET ORAL at 20:54

## 2021-01-09 RX ADMIN — AMLODIPINE BESYLATE 10 MG: 5 TABLET ORAL at 08:35

## 2021-01-09 RX ADMIN — GABAPENTIN 300 MG: 300 CAPSULE ORAL at 08:35

## 2021-01-09 RX ADMIN — GABAPENTIN 300 MG: 300 CAPSULE ORAL at 20:54

## 2021-01-09 NOTE — PROGRESS NOTES
Hospitalist Progress Note    Subjective:   Daily Progress Note: 2021 9:30 AM    Hospital Course:  Pt admitted for violent behavior at his group home, and would not accept him back on 20   Psychiatry consulted for medication recommendations. CM following for appropriate placement.     Subjective: Pt seen in room, pt is pleasant and cooperative, no concerns overnight. Current Facility-Administered Medications   Medication Dose Route Frequency    amLODIPine (NORVASC) tablet 10 mg  10 mg Oral DAILY    sodium chloride (NS) flush 5-40 mL  5-40 mL IntraVENous PRN    acetaminophen (TYLENOL) tablet 650 mg  650 mg Oral Q6H PRN    Or    acetaminophen (TYLENOL) suppository 650 mg  650 mg Rectal Q6H PRN    polyethylene glycol (MIRALAX) packet 17 g  17 g Oral DAILY PRN    promethazine (PHENERGAN) tablet 12.5 mg  12.5 mg Oral Q6H PRN    Or    ondansetron (ZOFRAN) injection 4 mg  4 mg IntraVENous Q6H PRN    enoxaparin (LOVENOX) injection 40 mg  40 mg SubCUTAneous DAILY    FLUoxetine (PROzac) capsule 10 mg  10 mg Oral DAILY    gabapentin (NEURONTIN) capsule 300 mg  300 mg Oral TID    risperiDONE (RisperDAL) tablet 1 mg  1 mg Oral BID        Review of Systems:    Review of Systems   Constitutional: Negative for fever. HENT: Negative for sinus pain and sore throat. Respiratory: Negative for cough and shortness of breath. Cardiovascular: Negative for chest pain and palpitations. Genitourinary: Negative for dysuria and urgency. Musculoskeletal: Negative. Neurological: Negative for dizziness and headaches. Objective:     Visit Vitals  BP (!) 147/80 (BP 1 Location: Right arm, BP Patient Position: At rest)   Pulse 76   Temp 98.7 °F (37.1 °C)   Resp 18   Ht 5' 6\" (1.676 m)   Wt 66.2 kg (146 lb)   SpO2 98%   BMI 23.57 kg/m²      O2 Device: Room air    Temp (24hrs), Av.7 °F (37.1 °C), Min:98.6 °F (37 °C), Max:98.7 °F (37.1 °C)      No intake/output data recorded.   No intake/output data recorded. PHYSICAL EXAM:    Physical Exam   Constitutional: He is oriented to person, place, and time. He appears well-developed. No distress. Neck: Normal range of motion. Neck supple. Cardiovascular: Normal rate, regular rhythm, normal heart sounds and intact distal pulses. Pulmonary/Chest: Effort normal and breath sounds normal.   Abdominal: Soft. Bowel sounds are normal.   Neurological: He is alert and oriented to person, place, and time. Skin: Skin is warm and dry. Psychiatric: He has a normal mood and affect. His speech is normal.   Bipolar disorder          Data Review    No results found for this or any previous visit (from the past 24 hour(s)). CT CODE NEURO HEAD WO CONTRAST   Final Result   Impression:   1. No acute intracranial hemorrhage. 2.  Likely chronic lacunar infarct of the right basal ganglia/internal capsule,   new since 12/2/2019.   3.  Chronic pontine lacunar infarcts and evidence of chronic small vessel   ischemic changes. Findings were discussed with the patient's nurse Felicia Garcia RN by Dr. Esa Sherman at 8:37 PM on  via phone conversation. XR CHEST PA LAT   Final Result          Principal Problem:    Chronic hepatitis C virus infection ([facility] [facility] (4/7/2011)    Active Problems:    Schizoaffective disorder ([facility] [facility] (12/26/2020)        Assessment/Plan:   1. Schizoaffective disorder- with occasional violent behavior, on risperdone, prozac, prolixin,  Behavior improved, pt cooperative and calm, psych following     2. Hypertension- controlled, on norvasc     3. Discharge plan-  following, for appropriate placement,  Does not have POA or legal guardianship, group home to re assess pt next Tuesday 11/12     4. Hx of HIV- not on medications.      5. Hx of CVA- CT head showing chronic lacunar infarct of the right basal ganglia/internal capsule   along with chronic small vessel ischemic changes.          DVT Prophylaxis: lovenox  Code Status:  Full Code  POA:    Care Plan discussed with:   ________staff nurse, patient_______________________________________________________    Gino Garcia NP

## 2021-01-09 NOTE — PROGRESS NOTES
Bedside shift change report given to [facility] RN(oncoming nurse) by Connie Pruitt LPN (offgoing nurse). Report included the following information SBAR.

## 2021-01-10 PROCEDURE — 74011250637 HC RX REV CODE- 250/637: Performed by: PSYCHIATRY & NEUROLOGY

## 2021-01-10 PROCEDURE — 99218 HC RM OBSERVATION: CPT

## 2021-01-10 PROCEDURE — 97116 GAIT TRAINING THERAPY: CPT

## 2021-01-10 PROCEDURE — 74011250636 HC RX REV CODE- 250/636: Performed by: PHYSICIAN ASSISTANT

## 2021-01-10 PROCEDURE — 96372 THER/PROPH/DIAG INJ SC/IM: CPT

## 2021-01-10 PROCEDURE — 74011250637 HC RX REV CODE- 250/637: Performed by: NURSE PRACTITIONER

## 2021-01-10 RX ADMIN — FLUOXETINE 10 MG: 10 CAPSULE ORAL at 08:44

## 2021-01-10 RX ADMIN — ENOXAPARIN SODIUM 40 MG: 40 INJECTION SUBCUTANEOUS at 08:44

## 2021-01-10 RX ADMIN — RISPERIDONE 1 MG: 1 TABLET ORAL at 08:45

## 2021-01-10 RX ADMIN — GABAPENTIN 300 MG: 300 CAPSULE ORAL at 08:44

## 2021-01-10 RX ADMIN — GABAPENTIN 300 MG: 300 CAPSULE ORAL at 16:14

## 2021-01-10 RX ADMIN — AMLODIPINE BESYLATE 10 MG: 5 TABLET ORAL at 08:43

## 2021-01-10 RX ADMIN — GABAPENTIN 300 MG: 300 CAPSULE ORAL at 20:29

## 2021-01-10 RX ADMIN — RISPERIDONE 1 MG: 1 TABLET ORAL at 20:28

## 2021-01-10 NOTE — PROGRESS NOTES
Hospitalist Progress Note    Subjective:   Daily Progress Note: 1/10/2021 6:26 PM    Hospital Course:  Pt admitted for violent behavior at his group home, and would not accept him back on 20   Psychiatry consulted for medication recommendations. CM following for appropriate placement.     Subjective: Pt seen in room, no concerns overnight. Current Facility-Administered Medications   Medication Dose Route Frequency    amLODIPine (NORVASC) tablet 10 mg  10 mg Oral DAILY    sodium chloride (NS) flush 5-40 mL  5-40 mL IntraVENous PRN    acetaminophen (TYLENOL) tablet 650 mg  650 mg Oral Q6H PRN    Or    acetaminophen (TYLENOL) suppository 650 mg  650 mg Rectal Q6H PRN    polyethylene glycol (MIRALAX) packet 17 g  17 g Oral DAILY PRN    promethazine (PHENERGAN) tablet 12.5 mg  12.5 mg Oral Q6H PRN    Or    ondansetron (ZOFRAN) injection 4 mg  4 mg IntraVENous Q6H PRN    enoxaparin (LOVENOX) injection 40 mg  40 mg SubCUTAneous DAILY    FLUoxetine (PROzac) capsule 10 mg  10 mg Oral DAILY    gabapentin (NEURONTIN) capsule 300 mg  300 mg Oral TID    risperiDONE (RisperDAL) tablet 1 mg  1 mg Oral BID        Review of Systems:    ROS   Constitutional: Negative for fever. HENT: Negative for sinus pain and sore throat. Respiratory: Negative for cough and shortness of breath. Cardiovascular: Negative for chest pain and palpitations. Genitourinary: Negative for dysuria and urgency. Musculoskeletal: Negative. Neurological: Negative for dizziness and headaches  Objective:     Visit Vitals  /83 (BP 1 Location: Left arm, BP Patient Position: At rest)   Pulse 82   Temp 97.6 °F (36.4 °C)   Resp 18   Ht 5' 6\" (1.676 m)   Wt 66.2 kg (146 lb)   SpO2 98%   BMI 23.57 kg/m²      O2 Device: Room air    Temp (24hrs), Av °F (36.7 °C), Min:97.6 °F (36.4 °C), Max:98.2 °F (36.8 °C)      No intake/output data recorded. No intake/output data recorded.     PHYSICAL EXAM:    Physical Exam   Constitutional: He is oriented to person, place, and time. He appears well-developed. No distress. Neck: Normal range of motion. Neck supple. Cardiovascular: Normal rate, regular rhythm, normal heart sounds and intact distal pulses. Pulmonary/Chest: Effort normal and breath sounds normal.   Abdominal: Soft. Bowel sounds are normal.   Neurological: He is alert and oriented to person, place, and time. Skin: Skin is warm and dry. Psychiatric: He has a normal mood and affect. His speech is normal.   Bipolar disorder     Data Review    No results found for this or any previous visit (from the past 24 hour(s)). CT CODE NEURO HEAD WO CONTRAST   Final Result   Impression:   1. No acute intracranial hemorrhage. 2.  Likely chronic lacunar infarct of the right basal ganglia/internal capsule,   new since 12/2/2019.   3.  Chronic pontine lacunar infarcts and evidence of chronic small vessel   ischemic changes. Findings were discussed with the patient's nurse James De La Rosa RN by Dr. Tory Vickers at 8:37 PM on  via phone conversation. XR CHEST PA LAT   Final Result          Principal Problem:    Chronic hepatitis C virus infection ([facility] (4/7/2011)    Active Problems:    Schizoaffective disorder ([facility] [address] (12/26/2020)        Assessment/Plan:   1. Schizoaffective disorder- with occasional violent behavior, on risperdone, prozac, prolixin,  Behavior improved, pt cooperative and calm, psych following     2. Hypertension- controlled, on norvasc     3. Discharge plan-  following, for appropriate placement,  Does not have POA or legal guardianship, group home to re assess pt next Tuesday 11/12     4. Hx of HIV- not on medications.      5. Hx of CVA- CT head showing chronic lacunar infarct of the right basal ganglia/internal capsule   along with chronic small vessel ischemic changes.          DVT Prophylaxis: lovenox  Code Status:  Full Code  POA:    Care Plan discussed with: _______________________________________________________________    [place] Forester, NP

## 2021-01-10 NOTE — PROGRESS NOTES
Problem: Mobility Impaired (Adult and Pediatric)  Goal: *Acute Goals and Plan of Care (Insert Text)  Description: Patient will move from supine to sit and sit to supine , scoot up and down, and roll side to side in bed with modified independence within 7 day(s). Patient will transfer from bed to chair and chair to bed with independence using the least restrictive device within 7 day(s). Patient will improve static standing balance to independence within 1 week(s). Patient will ambulate 100 feet with independence with least restrictive device within 1 weeks. Outcome: Progressing Towards Goal     PHYSICAL THERAPY REEVALUATION  Patient: Gilberto Coe (65 y.o. male)  Date: 1/10/2021  Primary Diagnosis: Schizoaffective disorder ([facility] [address] [F25.9]        Precautions: fall         ASSESSMENT  Pt received supine in bed with HOB elevated, agreeable to treatment. PT acute reassessment completed. Initially evaluated 12/28/2020. Pt is now mod independent for bed mobility and transfers. Gait continues to be unsafe for independent mobility with and without AD. With RW, pt ambulated 100 feet with frequent breaks to talk. Pt reports he cannot ambulate and talk at the same time because he has to concentrate on his walking. Noted significant gait deviations including scissoring, narrow MARCELLA, poor safety awareness, poor technique with AD including frequently lifting the walker up in the air, cues needed to stay close to RW. Pt states he normally uses a rollator at home. Gait evaluated without walker for 5 feet, this was also unsafe with scissoring and forward lean noted. Unsure on the pt's PLOF, feel he may benefit from SNF due to safety issues with ambulation depending on level of assist provided at group home.      Current Level of Function Impacting Discharge (mobility/balance): unsafe with gait, min A for AD management and safety    Other factors to consider for discharge: level of assist provided at group home Patient will benefit from skilled therapy intervention to address the above noted impairments. PLAN :  Recommendations and Planned Interventions: gait training, therapeutic exercises, patient and family training/education, and therapeutic activities      Frequency/Duration: Patient will be followed by physical therapy:  3 times a week to address goals. Recommendation for discharge: (in order for the patient to meet his/her long term goals)  Therapy up to 5 days/week in SNF setting or intensive home health therapy program    This discharge recommendation:  Has been made in collaboration with the attending provider and/or case management    Equipment recommendations for successful discharge (if) home: patient owns DME required for discharge         SUBJECTIVE:   Patient stated I can't talk while I am walking. 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  arm surgery due to abscess    HX OTHER SURGICAL      flank mole removal     Hospital course since last seen and reason for reevaluation: 7 day re-evaluation    Personal factors and/or comorbidities impacting plan of care:     Home Situation  Home Environment: Group home  One/Two Story Residence: One story  Living Alone: No  Support Systems: (group home caregivers)  Patient Expects to be Discharged to[de-identified] Group home  Current DME Used/Available at Home: Walker, rolling    EXAMINATION/PRESENTATION/DECISION MAKING: Critical Behavior:  Neurologic State: Confused, Eyes open spontaneously  Orientation Level: Oriented to person, Oriented to place  Cognition: Decreased attention/concentration, Impulsive  Safety/Judgement: Decreased insight into deficits  Hearing: Auditory  Auditory Impairment: None  Skin:  intact  Edema: WNL  Range Of Motion:  AROM: Within functional limits                       Strength:    Strength: Generally decreased, functional grossly 4/5 with MMT BLE, pt reports L side is weaker       Functional Mobility:  Bed Mobility:        Sit to Supine: Modified independent  Scooting: Modified independent  Transfers:  Sit to Stand: Modified independent  Stand to Sit: Modified independent                       Balance:   Sitting: Intact  Sitting - Static: Good (unsupported)  Sitting - Dynamic: Good (unsupported)  Standing: Impaired; Without support  Standing - Static: Unsupported; Fair  Ambulation/Gait Training:  Distance (ft): 100 Feet (ft)     Ambulation - Level of Assistance: Minimal assistance        Gait Abnormalities: Decreased step clearance;Scissoring        Base of Support: Narrowed     Speed/Ginny: Fluctuations     Swing Pattern: Left asymmetrical;Right asymmetrical                  Stairs:                Pain Rating:  No pain reported    Activity Tolerance:   Good  Please refer to the flowsheet for vital signs taken during this treatment. After treatment patient left in no apparent distress:   Supine in bed and Call bell within reach    COMMUNICATION/EDUCATION:   The patients plan of care was discussed with: Registered nurse. Patient understands intent and goals of therapy, but is neutral about his/her participation.     Thank you for this referral.  Estella Colbert   Time Calculation: 23 mins

## 2021-01-11 LAB
ANION GAP SERPL CALC-SCNC: 3 MMOL/L (ref 5–15)
BUN SERPL-MCNC: 13 MG/DL (ref 6–20)
BUN/CREAT SERPL: 17 (ref 12–20)
CA-I BLD-MCNC: 8.4 MG/DL (ref 8.5–10.1)
CHLORIDE SERPL-SCNC: 107 MMOL/L (ref 97–108)
CO2 SERPL-SCNC: 28 MMOL/L (ref 21–32)
CREAT SERPL-MCNC: 0.77 MG/DL (ref 0.7–1.3)
ERYTHROCYTE [DISTWIDTH] IN BLOOD BY AUTOMATED COUNT: 14.5 % (ref 11.5–14.5)
GLUCOSE SERPL-MCNC: 92 MG/DL (ref 65–100)
HCT VFR BLD AUTO: 33.8 % (ref 36.6–50.3)
HGB BLD-MCNC: 10.7 G/DL (ref 12.1–17)
MCH RBC QN AUTO: 30 PG (ref 26–34)
MCHC RBC AUTO-ENTMCNC: 31.7 G/DL (ref 30–36.5)
MCV RBC AUTO: 94.7 FL (ref 80–99)
PLATELET # BLD AUTO: 271 K/UL (ref 150–400)
PMV BLD AUTO: 11.1 FL (ref 8.9–12.9)
POTASSIUM SERPL-SCNC: 3.9 MMOL/L (ref 3.5–5.1)
RBC # BLD AUTO: 3.57 M/UL (ref 4.1–5.7)
SODIUM SERPL-SCNC: 138 MMOL/L (ref 136–145)
WBC # BLD AUTO: 4.5 K/UL (ref 4.1–11.1)

## 2021-01-11 PROCEDURE — 36415 COLL VENOUS BLD VENIPUNCTURE: CPT

## 2021-01-11 PROCEDURE — 97530 THERAPEUTIC ACTIVITIES: CPT

## 2021-01-11 PROCEDURE — 74011000302 HC RX REV CODE- 302: Performed by: NURSE PRACTITIONER

## 2021-01-11 PROCEDURE — 74011250637 HC RX REV CODE- 250/637: Performed by: PSYCHIATRY & NEUROLOGY

## 2021-01-11 PROCEDURE — 85027 COMPLETE CBC AUTOMATED: CPT

## 2021-01-11 PROCEDURE — 96372 THER/PROPH/DIAG INJ SC/IM: CPT

## 2021-01-11 PROCEDURE — 80048 BASIC METABOLIC PNL TOTAL CA: CPT

## 2021-01-11 PROCEDURE — 86580 TB INTRADERMAL TEST: CPT | Performed by: NURSE PRACTITIONER

## 2021-01-11 PROCEDURE — 74011250636 HC RX REV CODE- 250/636: Performed by: PHYSICIAN ASSISTANT

## 2021-01-11 PROCEDURE — 74011250637 HC RX REV CODE- 250/637: Performed by: NURSE PRACTITIONER

## 2021-01-11 PROCEDURE — 99218 HC RM OBSERVATION: CPT

## 2021-01-11 PROCEDURE — 74011250637 HC RX REV CODE- 250/637: Performed by: PHYSICIAN ASSISTANT

## 2021-01-11 RX ADMIN — ACETAMINOPHEN 650 MG: 325 TABLET, FILM COATED ORAL at 15:08

## 2021-01-11 RX ADMIN — FLUOXETINE 10 MG: 10 CAPSULE ORAL at 08:50

## 2021-01-11 RX ADMIN — TUBERCULIN PURIFIED PROTEIN DERIVATIVE 5 UNITS: 5 INJECTION, SOLUTION INTRADERMAL at 12:00

## 2021-01-11 RX ADMIN — GABAPENTIN 300 MG: 300 CAPSULE ORAL at 15:11

## 2021-01-11 RX ADMIN — AMLODIPINE BESYLATE 10 MG: 5 TABLET ORAL at 08:50

## 2021-01-11 RX ADMIN — ACETAMINOPHEN 650 MG: 325 TABLET, FILM COATED ORAL at 08:50

## 2021-01-11 RX ADMIN — RISPERIDONE 1 MG: 1 TABLET ORAL at 22:56

## 2021-01-11 RX ADMIN — GABAPENTIN 300 MG: 300 CAPSULE ORAL at 08:50

## 2021-01-11 RX ADMIN — RISPERIDONE 1 MG: 1 TABLET ORAL at 08:50

## 2021-01-11 RX ADMIN — ENOXAPARIN SODIUM 40 MG: 40 INJECTION SUBCUTANEOUS at 08:50

## 2021-01-11 RX ADMIN — GABAPENTIN 300 MG: 300 CAPSULE ORAL at 22:56

## 2021-01-11 NOTE — PROGRESS NOTES
Hospitalist Progress Note    Subjective:   Daily Progress Note: 2021 4:50 PM    Hospital Course:  Pt admitted for violent behavior at his group home, and would not accept him back on 20   Psychiatry consulted for medication recommendations. CM following for appropriate placement.     Subjective: Pt seen in room, pt has been calm , cooperative,no complaints    Current Facility-Administered Medications   Medication Dose Route Frequency    tuberculin injection 5 Units  5 Units IntraDERMal ONCE    amLODIPine (NORVASC) tablet 10 mg  10 mg Oral DAILY    sodium chloride (NS) flush 5-40 mL  5-40 mL IntraVENous PRN    acetaminophen (TYLENOL) tablet 650 mg  650 mg Oral Q6H PRN    Or    acetaminophen (TYLENOL) suppository 650 mg  650 mg Rectal Q6H PRN    polyethylene glycol (MIRALAX) packet 17 g  17 g Oral DAILY PRN    promethazine (PHENERGAN) tablet 12.5 mg  12.5 mg Oral Q6H PRN    Or    ondansetron (ZOFRAN) injection 4 mg  4 mg IntraVENous Q6H PRN    enoxaparin (LOVENOX) injection 40 mg  40 mg SubCUTAneous DAILY    FLUoxetine (PROzac) capsule 10 mg  10 mg Oral DAILY    gabapentin (NEURONTIN) capsule 300 mg  300 mg Oral TID    risperiDONE (RisperDAL) tablet 1 mg  1 mg Oral BID        Review of Systems:    ROS   Constitutional: Negative for fever. HENT: Negative for sinus pain and sore throat.    Respiratory: Negative for cough and shortness of breath.    Cardiovascular: Negative for chest pain and palpitations. Genitourinary: Negative for dysuria and urgency. Musculoskeletal: Negative.    Neurological: Negative for dizziness and headaches  Objective:     Visit Vitals  /76   Pulse 70   Temp 98.4 °F (36.9 °C)   Resp 12   Ht 5' 6\" (1.676 m)   Wt 66.2 kg (146 lb)   SpO2 97%   BMI 23.57 kg/m²      O2 Device: Room air    Temp (24hrs), Av.9 °F (36.6 °C), Min:97.5 °F (36.4 °C), Max:98.4 °F (36.9 °C)      No intake/output data recorded. No intake/output data recorded.     PHYSICAL EXAM:    Physical Exam   Constitutional: He is oriented to person, place, and time. He appears well-developed. No distress. Neck: Normal range of motion. Neck supple. Cardiovascular: Normal rate, regular rhythm, normal heart sounds and intact distal pulses. Pulmonary/Chest: Effort normal and breath sounds normal.   Abdominal: Soft. Bowel sounds are normal.   Neurological: He is alert and oriented to person, place, and time. Skin: Skin is warm and dry. Psychiatric: He has a normal mood and affect. His speech is normal.   Bipolar disorder     Data Review    Recent Results (from the past 24 hour(s))   CBC W/O DIFF    Collection Time: 01/11/21  2:37 AM   Result Value Ref Range    WBC 4.5 4.1 - 11.1 K/uL    RBC 3.57 (L) 4.10 - 5.70 M/uL    HGB 10.7 (L) 12.1 - 17.0 g/dL    HCT 33.8 (L) 36.6 - 50.3 %    MCV 94.7 80.0 - 99.0 FL    MCH 30.0 26.0 - 34.0 PG    MCHC 31.7 30.0 - 36.5 g/dL    RDW 14.5 11.5 - 14.5 %    PLATELET 450 223 - 740 K/uL    MPV 11.1 8.9 - 06.8 FL   METABOLIC PANEL, BASIC    Collection Time: 01/11/21  2:37 AM   Result Value Ref Range    Sodium 138 136 - 145 mmol/L    Potassium 3.9 3.5 - 5.1 mmol/L    Chloride 107 97 - 108 mmol/L    CO2 28 21 - 32 mmol/L    Anion gap 3 (L) 5 - 15 mmol/L    Glucose 92 65 - 100 mg/dL    BUN 13 6 - 20 mg/dL    Creatinine 0.77 0.70 - 1.30 mg/dL    BUN/Creatinine ratio 17 12 - 20      GFR est AA >60 >60 ml/min/1.73m2    GFR est non-AA >60 >60 ml/min/1.73m2    Calcium 8.4 (L) 8.5 - 10.1 mg/dL       CT CODE NEURO HEAD WO CONTRAST   Final Result   Impression:   1. No acute intracranial hemorrhage. 2.  Likely chronic lacunar infarct of the right basal ganglia/internal capsule,   new since 12/2/2019.   3.  Chronic pontine lacunar infarcts and evidence of chronic small vessel   ischemic changes. Findings were discussed with the patient's nurse Joe Christianson RN by Dr. Lizzie Kamara at 8:37 PM on  via phone conversation.       XR CHEST PA LAT   Final Result Principal Problem:    Chronic hepatitis C virus infection ([facility] (4/7/2011)    Active Problems:    Schizoaffective disorder ([facility] (12/26/2020)        Assessment/Plan:   1. Schizoaffective disorder- with occasional violent behavior, on risperdone, prozac, prolixin,  Behavior improved, pt cooperative and calm, psych following     2. Hypertension- controlled, on norvasc     3. Discharge plan- CM following, pt accepted to group home, order PPD for screening,  Tentative d/c date is 1/13. 4. Hx of HIV- not on medications.      5.  Hx of CVA- CT head showing chronic lacunar infarct of the right basal ganglia/internal capsule   along with chronic small vessel ischemic changes         DVT Prophylaxis: lovenox  Code Status:  Full Code  POA:    Care Plan discussed with:   __staff nurse, case management_____________________________________________________________    Tonya Isabel NP

## 2021-01-11 NOTE — PROGRESS NOTES
KRISTAN spoke with Rosemary Perrin [phone] with a [facility] group home [facility]. She stated she needs a current PPD on patient and UAI. She states she will  the patient Wednesday after the PPD is read and negative. UAI is already completed.

## 2021-01-11 NOTE — PROGRESS NOTES
PHYSICAL THERAPY TREATMENT  Patient: Eduin Dow (70 y.o. male)  Date: 1/11/2021  Diagnosis: Schizoaffective disorder (HCC) [F25.9] Chronic hepatitis C virus infection (HCC)       Precautions:    Chart, physical therapy assessment, plan of care and goals were reviewed.    ASSESSMENT  Patient continues with skilled PT services and is progressing towards goals. Pt. Semi supine in bed upon arrival and agreeable to therapy session. Had difficulty with supine to sit transfer. Continues to have strange fluctuations with ambulation with toe walking, scissoring gait, 90 degree forward flexion with trunk, spiratic COD. Difficulty with RLE LAQ. Recommend DC to snf, .     Current Level of Function Impacting Discharge (mobility/balance): Cognition, gait, balance    Other factors to consider for discharge: tbd         PLAN :  Patient continues to benefit from skilled intervention to address the above impairments.  Continue treatment per established plan of care.  to address goals.    Recommendation for discharge: (in order for the patient to meet his/her long term goals)  Therapy up to 5 days/week in SNF setting    This discharge recommendation:  Has been made in collaboration with the attending provider and/or case management    IF patient discharges home will need the following DME: rolling walker       SUBJECTIVE:   Patient stated hallelujah .”    OBJECTIVE DATA SUMMARY:   Critical Behavior:  Neurologic State: Alert  Orientation Level: Oriented to person, Oriented to place, Oriented to time  Cognition: Decreased attention/concentration, Decreased command following  Safety/Judgement: Decreased insight into deficits  Functional Mobility Training:  Bed Mobility:  Rolling: Stand-by assistance  Supine to Sit: Modified independent  Sit to Supine: Modified independent  Scooting: Modified independent        Transfers:  Sit to Stand: Modified independent  Stand to Sit: Modified independent  Stand Pivot Transfers: Contact guard  assistance                          Balance:  Sitting: Intact  Sitting - Static: Good (unsupported)  Sitting - Dynamic: Good (unsupported)  Standing: Impaired;Pull to stand; With support  Standing - Static: Fair;Constant support  Standing - Dynamic : Fair;Constant support  Ambulation/Gait Training:  Distance (ft): 100 Feet (ft)     Ambulation - Level of Assistance: Contact guard assistance        Gait Abnormalities: Decreased step clearance;Scissoring; Toe walking        Base of Support: Narrowed     Speed/Ginny: Fluctuations     Swing Pattern: Left asymmetrical;Right asymmetrical                 Stairs: Therapeutic Exercises:   10 x AP, LAQ, MARCH, HIP ABD/ADD  Pain Ratin    Activity Tolerance:   Fair  Please refer to the flowsheet for vital signs taken during this treatment. After treatment patient left in no apparent distress:   Supine in bed    COMMUNICATION/COLLABORATION:   The patients plan of care was discussed with: Physical therapy assistant.      Alondra Swift   Time Calculation: 16 mins

## 2021-01-12 PROCEDURE — 74011250637 HC RX REV CODE- 250/637: Performed by: PSYCHIATRY & NEUROLOGY

## 2021-01-12 PROCEDURE — 99218 HC RM OBSERVATION: CPT

## 2021-01-12 PROCEDURE — 74011250637 HC RX REV CODE- 250/637: Performed by: NURSE PRACTITIONER

## 2021-01-12 RX ORDER — ATORVASTATIN CALCIUM 20 MG/1
20 TABLET, FILM COATED ORAL
Status: DISCONTINUED | OUTPATIENT
Start: 2021-01-12 | End: 2021-01-13 | Stop reason: HOSPADM

## 2021-01-12 RX ORDER — GUAIFENESIN 100 MG/5ML
81 LIQUID (ML) ORAL DAILY
Status: DISCONTINUED | OUTPATIENT
Start: 2021-01-12 | End: 2021-01-13 | Stop reason: HOSPADM

## 2021-01-12 RX ADMIN — AMLODIPINE BESYLATE 10 MG: 5 TABLET ORAL at 08:29

## 2021-01-12 RX ADMIN — GABAPENTIN 300 MG: 300 CAPSULE ORAL at 08:29

## 2021-01-12 RX ADMIN — ASPIRIN 81 MG 81 MG: 81 TABLET ORAL at 15:40

## 2021-01-12 RX ADMIN — GABAPENTIN 300 MG: 300 CAPSULE ORAL at 21:34

## 2021-01-12 RX ADMIN — ATORVASTATIN CALCIUM 20 MG: 20 TABLET, FILM COATED ORAL at 21:34

## 2021-01-12 RX ADMIN — RISPERIDONE 1 MG: 1 TABLET ORAL at 08:29

## 2021-01-12 RX ADMIN — GABAPENTIN 300 MG: 300 CAPSULE ORAL at 15:40

## 2021-01-12 RX ADMIN — RISPERIDONE 1 MG: 1 TABLET ORAL at 21:34

## 2021-01-12 RX ADMIN — FLUOXETINE 10 MG: 10 CAPSULE ORAL at 08:29
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
## 2021-01-12 NOTE — PROGRESS NOTES
CM Called Britney  [phone] with [facility]. She will need patient PPD to be read tomorrow. If clear patient can be discharged to them at [address], [place] [phone]. Order Obtained for [facility] and Sent to [facility].  The group home would like him to work with stairs, CM messaged PT to see if this is possible. Patient will need a medicaid cab set up for transport.

## 2021-01-12 NOTE — PROGRESS NOTES
Bedside shift change report given to Jennifer Putnam (oncoming nurse) by CHARLINE Moya RN (offgoing nurse). Report included the following information SBAR, Kardex, Procedure Summary, Intake/Output and Recent Results.

## 2021-01-13 VITALS
HEART RATE: 74 BPM | RESPIRATION RATE: 18 BRPM | HEIGHT: 66 IN | TEMPERATURE: 98.7 F | WEIGHT: 146 LBS | SYSTOLIC BLOOD PRESSURE: 137 MMHG | DIASTOLIC BLOOD PRESSURE: 72 MMHG | BODY MASS INDEX: 23.46 KG/M2 | OXYGEN SATURATION: 99 %

## 2021-01-13 PROCEDURE — 74011250637 HC RX REV CODE- 250/637: Performed by: NURSE PRACTITIONER

## 2021-01-13 PROCEDURE — 99218 HC RM OBSERVATION: CPT

## 2021-01-13 PROCEDURE — 97116 GAIT TRAINING THERAPY: CPT

## 2021-01-13 PROCEDURE — 74011250637 HC RX REV CODE- 250/637: Performed by: PSYCHIATRY & NEUROLOGY

## 2021-01-13 RX ORDER — ATORVASTATIN CALCIUM 20 MG/1
20 TABLET, FILM COATED ORAL
Qty: 30 TAB | Refills: 0 | Status: SHIPPED | OUTPATIENT
Start: 2021-01-13 | End: 2021-02-12

## 2021-01-13 RX ORDER — GUAIFENESIN 100 MG/5ML
81 LIQUID (ML) ORAL DAILY
Qty: 30 TAB | Refills: 0 | Status: SHIPPED | OUTPATIENT
Start: 2021-01-14 | End: 2021-02-13

## 2021-01-13 RX ORDER — RISPERIDONE 1 MG/1
1 TABLET, FILM COATED ORAL 2 TIMES DAILY
Qty: 60 TAB | Refills: 0 | Status: SHIPPED | OUTPATIENT
Start: 2021-01-13 | End: 2021-02-12

## 2021-01-13 RX ADMIN — ASPIRIN 81 MG 81 MG: 81 TABLET ORAL at 09:00

## 2021-01-13 RX ADMIN — RISPERIDONE 1 MG: 1 TABLET ORAL at 09:01

## 2021-01-13 RX ADMIN — FLUOXETINE 10 MG: 10 CAPSULE ORAL at 09:01

## 2021-01-13 RX ADMIN — GABAPENTIN 300 MG: 300 CAPSULE ORAL at 09:01

## 2021-01-13 RX ADMIN — AMLODIPINE BESYLATE 10 MG: 5 TABLET ORAL at 09:01

## 2021-01-13 NOTE — ROUTINE PROCESS
Pt. Alert and oriented. VSS. NO IV access at time of discharge. DME (walker) with patient. Wheeled to lobby and discharged via medicaid cab to [facility]. No acute distress noted.

## 2021-01-13 NOTE — PROGRESS NOTES
Patient has discharge order in place. Patient has negative PPD. Patient has a rolling walker delivered at bedside. PT has worked with patient on stairs today and he was able to negotiate 12 stairs. Patient will need transportation set up to go to [address], [address],[facility] after 4pm.  CM called Group home owner Soham Rodriguez [phone] (Must dial [phone] from hospital telephone), she stated patient can come today, CM printed DC summary and [facility] to go with patient upon discharge. Discharge plan of care/case management plan validated with provider discharge order. CM and primary nurse completed discharge checklist.  IMM not necessary, patient has medicaid and is in observation status.

## 2021-01-13 NOTE — PROGRESS NOTES
Bedside shift change report given to ANNABELLE Cervantes RN (oncoming nurse) by CHARLINE Moya RN (offgoing nurse). Report included the following information SBAR, Kardex, Intake/Output, MAR and Recent Results.  2
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
## 2021-01-13 NOTE — PROGRESS NOTES
PHYSICAL THERAPY TREATMENT  Patient: Kathryn Frost (89 y.o. male)  Date: 1/13/2021  Diagnosis: Schizoaffective disorder ([facility] [F25.9] Chronic hepatitis C virus infection ([facility]       Precautions:    Chart, physical therapy assessment, plan of care and goals were reviewed. ASSESSMENT  Patient continues with skilled PT services and is progressing towards goals. Pt found sitting in chair upon arrival agreeable to PT session. Patient ambulated well approx 120 ft with RW however CGA provided due to shuffled gait from foot drag bilaterally. Patient was able to negotiate a flight of stairs (12 steps) with rails and CGA with no LOB. Cueing provided at times for safety techniques but otherwise pt tolerated session well and is improving towards goals. Supervising PT present during visit to ensure safety and also to edit goal for stairs. Pt supposed to d/c home today but we will cont to progress if pt remains in hospital.     Current Level of Function Impacting Discharge (mobility/balance): foot drag, shuffled gait. Other factors to consider for discharge: assistance at group home         PLAN :  Patient continues to benefit from skilled intervention to address the above impairments. Continue treatment per established plan of care. to address goals. Recommendation for discharge: (in order for the patient to meet his/her long term goals)  HHPT at group home    This discharge recommendation:  Has been made in collaboration with the attending provider and/or case management    IF patient discharges home will need the following DME: patient owns DME required for discharge       SUBJECTIVE:   Patient stated sure I can do the steps.     OBJECTIVE DATA SUMMARY:   Critical Behavior:  Neurologic State: Alert, Eyes open spontaneously  Orientation Level: Oriented X4  Cognition: Poor safety awareness, Impulsive  Safety/Judgement: Decreased insight into deficits  Functional Mobility Training:  Bed Mobility:  Pt in chair upon arrival.    Transfers:  Sit to Stand: Stand-by assistance  Stand to Sit: Stand-by assistance    Balance:  Standing: Impaired; With support  Standing - Static: Fair;Constant support  Standing - Dynamic : Fair;Constant support  Ambulation/Gait Training:  Distance (ft): 120 Feet (ft)  Ambulation - Level of Assistance: Contact guard assistance  Gait Abnormalities: Decreased step clearance; Toe walking;Shuffling gait  Base of Support: Narrowed  Speed/Ginny: Fluctuations  Step Length: Left shortened;Right shortened    Pain Ratin/10    Activity Tolerance:   Good  Please refer to the flowsheet for vital signs taken during this treatment. After treatment patient left in no apparent distress:   Sitting in chair and Call bell within reach    COMMUNICATION/COLLABORATION:   The patients plan of care was discussed with: Physical therapist, Registered nurse, and Case management. Kayla Kirkland   Time Calculation: 18 mins         Problem: Mobility Impaired (Adult and Pediatric)  Goal: *Acute Goals and Plan of Care (Insert Text)  Description: Patient will move from supine to sit and sit to supine , scoot up and down, and roll side to side in bed with modified independence within 7 day(s). Patient will transfer from bed to chair and chair to bed with independence using the least restrictive device within 7 day(s). Patient will improve static standing balance to independence within 1 week(s). Patient will ambulate 100 feet with independence with least restrictive device within 1 weeks.     Added stair goal 21(Olimpia Calix, PT, DPT): pt will be able to ascend/descend 4 steps with rails and CGA/SBAx1- goal met during session today, PT present during PTA tx and added stair goal at time of treatment     Outcome: Progressing Towards Goal

## 2021-01-14 LAB
GLUCOSE BLD STRIP.AUTO-MCNC: 100 MG/DL (ref 65–100)
GLUCOSE BLD STRIP.AUTO-MCNC: 106 MG/DL (ref 65–100)
GLUCOSE BLD STRIP.AUTO-MCNC: 108 MG/DL (ref 65–100)
GLUCOSE BLD STRIP.AUTO-MCNC: 109 MG/DL (ref 65–100)
GLUCOSE BLD STRIP.AUTO-MCNC: 110 MG/DL (ref 65–100)
GLUCOSE BLD STRIP.AUTO-MCNC: 111 MG/DL (ref 65–100)
GLUCOSE BLD STRIP.AUTO-MCNC: 111 MG/DL (ref 65–100)
GLUCOSE BLD STRIP.AUTO-MCNC: 116 MG/DL (ref 65–100)
GLUCOSE BLD STRIP.AUTO-MCNC: 117 MG/DL (ref 65–100)
GLUCOSE BLD STRIP.AUTO-MCNC: 125 MG/DL (ref 65–100)
GLUCOSE BLD STRIP.AUTO-MCNC: 128 MG/DL (ref 65–100)
GLUCOSE BLD STRIP.AUTO-MCNC: 213 MG/DL (ref 65–100)
GLUCOSE BLD STRIP.AUTO-MCNC: 87 MG/DL (ref 65–100)
GLUCOSE BLD STRIP.AUTO-MCNC: 98 MG/DL (ref 65–100)
PERFORMED BY, TECHID: ABNORMAL
PERFORMED BY, TECHID: NORMAL

## 2021-01-16 ENCOUNTER — HOSPITAL ENCOUNTER (EMERGENCY)
Age: 71
Discharge: LEFT BEFORE DISCHARGE | End: 2021-01-16
Payer: MEDICAID

## 2021-01-16 PROCEDURE — 75810000275 HC EMERGENCY DEPT VISIT NO LEVEL OF CARE
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
## 2021-01-17 NOTE — ED NOTES
here at this time, patient helped to vehicle to transport back to group [facility] in [place]. Observed patient leaving in vehicle.

## 2021-01-20 PROCEDURE — 96372 THER/PROPH/DIAG INJ SC/IM: CPT

## 2021-01-20 PROCEDURE — 99284 EMERGENCY DEPT VISIT MOD MDM: CPT

## 2021-01-21 ENCOUNTER — HOSPITAL ENCOUNTER (EMERGENCY)
Age: 71
Discharge: HOME | End: 2021-01-21
Attending: EMERGENCY MEDICINE
Payer: MEDICAID

## 2021-01-21 VITALS
HEIGHT: 65 IN | WEIGHT: 145 LBS | TEMPERATURE: 98 F | DIASTOLIC BLOOD PRESSURE: 67 MMHG | BODY MASS INDEX: 24.16 KG/M2 | OXYGEN SATURATION: 98 % | RESPIRATION RATE: 16 BRPM | HEART RATE: 90 BPM | SYSTOLIC BLOOD PRESSURE: 116 MMHG

## 2021-01-21 DIAGNOSIS — G89.29 CHRONIC MIDLINE LOW BACK PAIN WITHOUT SCIATICA: Primary | ICD-10-CM

## 2021-01-21 DIAGNOSIS — M54.50 CHRONIC MIDLINE LOW BACK PAIN WITHOUT SCIATICA: Primary | ICD-10-CM

## 2021-01-21 PROCEDURE — 96372 THER/PROPH/DIAG INJ SC/IM: CPT

## 2021-01-21 PROCEDURE — 74011250636 HC RX REV CODE- 250/636: Performed by: EMERGENCY MEDICINE

## 2021-01-21 RX ORDER — KETOROLAC TROMETHAMINE 30 MG/ML
30 INJECTION, SOLUTION INTRAMUSCULAR; INTRAVENOUS
Status: COMPLETED | OUTPATIENT
Start: 2021-01-21 | End: 2021-01-21

## 2021-01-21 RX ADMIN — KETOROLAC TROMETHAMINE 30 MG: 30 INJECTION, SOLUTION INTRAMUSCULAR; INTRAVENOUS at 01:50

## 2021-01-21 NOTE — ED NOTES
Emergency Department Nursing Plan of Care       The Nursing Plan of Care is developed from the Nursing assessment and Emergency Department Attending provider initial evaluation. The plan of care may be reviewed in the ED Provider note.     The Plan of Care was developed with the following considerations:   Patient / Family readiness to learn indicated by:verbalized understanding  Persons(s) to be included in education: patient  Barriers to Learning/Limitations:No    Signed     Lalita Chavez RN    1/20/2021   11:39 PM
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
## 2021-02-17 ENCOUNTER — HOSPITAL ENCOUNTER (EMERGENCY)
Age: 71
Discharge: HOME | End: 2021-02-17
Attending: EMERGENCY MEDICINE
Payer: MEDICAID

## 2021-02-17 ENCOUNTER — APPOINTMENT (OUTPATIENT)
Dept: CT IMAGING | Age: 71
End: 2021-02-17
Attending: NURSE PRACTITIONER
Payer: MEDICAID

## 2021-02-17 VITALS
HEIGHT: 65 IN | HEART RATE: 85 BPM | OXYGEN SATURATION: 98 % | TEMPERATURE: 97.9 F | BODY MASS INDEX: 23.88 KG/M2 | SYSTOLIC BLOOD PRESSURE: 131 MMHG | WEIGHT: 143.3 LBS | DIASTOLIC BLOOD PRESSURE: 68 MMHG | RESPIRATION RATE: 16 BRPM

## 2021-02-17 VITALS
WEIGHT: 145 LBS | RESPIRATION RATE: 15 BRPM | TEMPERATURE: 98.2 F | HEART RATE: 71 BPM | HEIGHT: 65 IN | OXYGEN SATURATION: 100 % | DIASTOLIC BLOOD PRESSURE: 83 MMHG | BODY MASS INDEX: 24.16 KG/M2 | SYSTOLIC BLOOD PRESSURE: 156 MMHG

## 2021-02-17 DIAGNOSIS — M54.50 CHRONIC MIDLINE LOW BACK PAIN WITHOUT SCIATICA: ICD-10-CM

## 2021-02-17 DIAGNOSIS — I10 ACCELERATED HYPERTENSION: Primary | ICD-10-CM

## 2021-02-17 DIAGNOSIS — Z59.00 HOMELESSNESS: ICD-10-CM

## 2021-02-17 DIAGNOSIS — F20.9 SCHIZOPHRENIA, UNSPECIFIED TYPE (HCC): Primary | ICD-10-CM

## 2021-02-17 DIAGNOSIS — Z76.5 MALINGERING: ICD-10-CM

## 2021-02-17 DIAGNOSIS — G89.29 CHRONIC MIDLINE LOW BACK PAIN WITHOUT SCIATICA: ICD-10-CM

## 2021-02-17 LAB
ALBUMIN SERPL-MCNC: 3.4 G/DL (ref 3.5–5)
ALBUMIN/GLOB SERPL: 0.9 {RATIO} (ref 1.1–2.2)
ALP SERPL-CCNC: 101 U/L (ref 45–117)
ALT SERPL-CCNC: 36 U/L (ref 12–78)
ANION GAP SERPL CALC-SCNC: 6 MMOL/L (ref 5–15)
AST SERPL-CCNC: 42 U/L (ref 15–37)
BASOPHILS # BLD: 0 K/UL (ref 0–0.1)
BASOPHILS NFR BLD: 1 % (ref 0–1)
BILIRUB SERPL-MCNC: 0.3 MG/DL (ref 0.2–1)
BUN SERPL-MCNC: 12 MG/DL (ref 6–20)
BUN/CREAT SERPL: 16 (ref 12–20)
CALCIUM SERPL-MCNC: 8.7 MG/DL (ref 8.5–10.1)
CHLORIDE SERPL-SCNC: 104 MMOL/L (ref 97–108)
CO2 SERPL-SCNC: 29 MMOL/L (ref 21–32)
CREAT SERPL-MCNC: 0.75 MG/DL (ref 0.7–1.3)
DIFFERENTIAL METHOD BLD: ABNORMAL
EOSINOPHIL # BLD: 0.1 K/UL (ref 0–0.4)
EOSINOPHIL NFR BLD: 2 % (ref 0–7)
ERYTHROCYTE [DISTWIDTH] IN BLOOD BY AUTOMATED COUNT: 13.4 % (ref 11.5–14.5)
ETHANOL SERPL-MCNC: <10 MG/DL
GLOBULIN SER CALC-MCNC: 3.8 G/DL (ref 2–4)
GLUCOSE SERPL-MCNC: 89 MG/DL (ref 65–100)
HCT VFR BLD AUTO: 32.1 % (ref 36.6–50.3)
HGB BLD-MCNC: 10.3 G/DL (ref 12.1–17)
IMM GRANULOCYTES # BLD AUTO: 0 K/UL (ref 0–0.04)
IMM GRANULOCYTES NFR BLD AUTO: 0 % (ref 0–0.5)
LYMPHOCYTES # BLD: 1.8 K/UL (ref 0.8–3.5)
LYMPHOCYTES NFR BLD: 35 % (ref 12–49)
MCH RBC QN AUTO: 30.3 PG (ref 26–34)
MCHC RBC AUTO-ENTMCNC: 32.1 G/DL (ref 30–36.5)
MCV RBC AUTO: 94.4 FL (ref 80–99)
MONOCYTES # BLD: 0.7 K/UL (ref 0–1)
MONOCYTES NFR BLD: 13 % (ref 5–13)
NEUTS SEG # BLD: 2.6 K/UL (ref 1.8–8)
NEUTS SEG NFR BLD: 49 % (ref 32–75)
NRBC # BLD: 0 K/UL (ref 0–0.01)
NRBC BLD-RTO: 0 PER 100 WBC
PLATELET # BLD AUTO: 330 K/UL (ref 150–400)
PMV BLD AUTO: 10.4 FL (ref 8.9–12.9)
POTASSIUM SERPL-SCNC: 3.7 MMOL/L (ref 3.5–5.1)
PROT SERPL-MCNC: 7.2 G/DL (ref 6.4–8.2)
RBC # BLD AUTO: 3.4 M/UL (ref 4.1–5.7)
SODIUM SERPL-SCNC: 139 MMOL/L (ref 136–145)
WBC # BLD AUTO: 5.2 K/UL (ref 4.1–11.1)

## 2021-02-17 PROCEDURE — 99283 EMERGENCY DEPT VISIT LOW MDM: CPT

## 2021-02-17 PROCEDURE — 36415 COLL VENOUS BLD VENIPUNCTURE: CPT

## 2021-02-17 PROCEDURE — 80053 COMPREHEN METABOLIC PANEL: CPT

## 2021-02-17 PROCEDURE — 85025 COMPLETE CBC W/AUTO DIFF WBC: CPT

## 2021-02-17 PROCEDURE — 99284 EMERGENCY DEPT VISIT MOD MDM: CPT

## 2021-02-17 PROCEDURE — 70450 CT HEAD/BRAIN W/O DYE: CPT

## 2021-02-17 PROCEDURE — 82077 ASSAY SPEC XCP UR&BREATH IA: CPT

## 2021-02-17 RX ORDER — ONDANSETRON 4 MG/1
TABLET, ORALLY DISINTEGRATING ORAL
COMMUNITY
Start: 2021-01-17

## 2021-02-17 RX ORDER — ACETAMINOPHEN 160 MG/5ML
SUSPENSION, ORAL (FINAL DOSE FORM) ORAL
COMMUNITY
Start: 2020-12-09

## 2021-02-17 RX ORDER — FLUTICASONE PROPIONATE AND SALMETEROL 250; 50 UG/1; UG/1
POWDER RESPIRATORY (INHALATION)
COMMUNITY
Start: 2020-03-28

## 2021-02-17 RX ORDER — AMLODIPINE BESYLATE 10 MG/1
10 TABLET ORAL DAILY
Qty: 30 TAB | Refills: 0 | Status: SHIPPED | OUTPATIENT
Start: 2021-02-17

## 2021-02-17 RX ORDER — AMLODIPINE BESYLATE 10 MG/1
10 TABLET ORAL DAILY
Qty: 20 TAB | Refills: 0 | Status: SHIPPED | OUTPATIENT
Start: 2021-02-17 | End: 2021-03-09

## 2021-02-17 RX ORDER — GABAPENTIN 300 MG/1
CAPSULE ORAL
COMMUNITY
Start: 2020-04-17

## 2021-02-17 RX ORDER — AMLODIPINE BESYLATE 10 MG/1
TABLET ORAL
COMMUNITY
Start: 2020-04-14 | End: 2021-02-17 | Stop reason: SDUPTHER

## 2021-02-17 SDOH — ECONOMIC STABILITY - HOUSING INSECURITY: HOMELESSNESS UNSPECIFIED: Z59.00

## 2021-02-17 NOTE — ED PROVIDER NOTES
EMERGENCY DEPARTMENT HISTORY AND PHYSICAL EXAM    Date: 2/17/2021  Patient Name: Xiang Feng    History of Presenting Illness     Chief Complaint   Patient presents with    Mental Health Problem         History Provided By: Patient    Chief Complaint: mental health problem        HPI: Xiang Feng is a 69 y.o. male with a PMH of See organic brain syndrome and as below who presents to the emergency department stating that he just does not want to stay in the hotel where he is living any longer and he is old and just does not want to be here anymore. He states that people in the hotel where he stays use drugs and he would harm them. Patient states he has had a history of substance abuse but has been clean for many years. Patient states he is homeless. On chart review noted patient was in the emergency department earlier today and given refills for his medications. Patient states he hears talking in his head but denies hallucinations. Noted history of schizophrenia. Patient states that he had fallen yesterday outside and has a bump on his right forehead. Because there are no symptoms or complaints of pain, there is no reported quality, severity, modifying factors, or associated signs and symptoms reported. PCP: Jovanna Kraus MD    Current Outpatient Medications   Medication Sig Dispense Refill    gabapentin (NEURONTIN) 300 mg capsule 300 mg = 1 CAP each dose, PO, tid, # 90 CAP, 0 Refills      Vitamin C 500 mg tablet       ondansetron (ZOFRAN ODT) 4 mg disintegrating tablet DISSOLVE 1 TABLET BY MOUTH THREE TIMES DAILY FOR NAUSEA/VOMITING      amLODIPine (NORVASC) 10 mg tablet Take 10 mg by mouth daily.       Walker misc Needs evaluation to proper type 1 Each 0    hydroCHLOROthiazide (HYDRODIURIL) 25 mg tablet take 1 tablet by mouth once daily 30 Tab 12    FLUoxetine (PROZAC) 10 mg capsule   0    fluticasone propion-salmeteroL (Advair Diskus) 250-50 mcg/dose diskus inhaler 1 PUFF, Inhalation, bid, 0 Refills, Dispense: 180, EA      fluticasone propionate (FLONASE ALLERGY RELIEF NA) 1 SPRAY, Nasal, daily, 0 Refills      amLODIPine (Norvasc) 10 mg tablet Take 1 Tab by mouth daily for 20 days. 20 Tab 0    amLODIPine (NORVASC) 10 mg tablet Take 1 Tab by mouth daily. 30 Tab 0    mometasone-formoterol (Dulera) 100-5 mcg/actuation HFA inhaler Take 2 Puffs by inhalation two (2) times a day.  fluticasone propionate (Flonase Allergy Relief) 50 mcg/actuation nasal spray 2 Sprays by Both Nostrils route daily.  trihexyphenidyl (ARTANE) 5 mg tablet Take 1 Tab by mouth three (3) times daily. 90 Tab 3    gabapentin (NEURONTIN) 300 mg capsule Take 1 Cap by mouth three (3) times daily. 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  arm surgery due to abscess    HX OTHER SURGICAL      flank mole removal       Family History:  Family History   Problem Relation Age of Onset    Kidney Disease Mother     Diabetes Mother     No Known Problems Father     Heart Disease Sister     Kidney Disease Sister     Diabetes Brother     Heart Disease Brother     Cancer Brother     Alcohol abuse Brother     Obesity Brother     Other Brother         Drugs       Social History:  Social History     Tobacco Use    Smoking status: Current Every Day Smoker     Packs/day: 0.25    Smokeless tobacco: Never Used   Substance Use Topics    Alcohol use: No    Drug use: No       Allergies:  No Known Allergies      Review of Systems   Review of Systems   Constitutional: Negative for chills and fatigue. HENT: Negative for congestion. Respiratory: Negative for cough and wheezing. Cardiovascular: Negative for chest pain. Gastrointestinal: Negative for abdominal pain. Musculoskeletal: Negative for arthralgias and back pain. Skin: Negative for rash. Neurological: Positive for headaches. Negative for dizziness. All other systems reviewed and are negative. Physical Exam     Vitals:    02/17/21 0937 02/17/21 1357   BP: 138/77 131/68   Pulse: 68 85   Resp: 16 16   Temp: 98.5 °F (36.9 °C) 97.9 °F (36.6 °C)   SpO2: 100% 98%   Weight: 65 kg (143 lb 4.8 oz)    Height: 5' 5\" (1.651 m)      Physical Exam  Vitals signs and nursing note reviewed. Constitutional:       Appearance: He is well-developed. HENT:      Head: Normocephalic and atraumatic. Right Ear: External ear normal.      Nose: Nose normal.      Mouth/Throat:      Mouth: Mucous membranes are moist.   Eyes:      General:         Right eye: No discharge. Left eye: No discharge. Conjunctiva/sclera: Conjunctivae normal.   Neck:      Musculoskeletal: Normal range of motion and neck supple. Cardiovascular:      Rate and Rhythm: Normal rate and regular rhythm. Heart sounds: Normal heart sounds. Pulmonary:      Effort: Pulmonary effort is normal. No respiratory distress. Breath sounds: Normal breath sounds. No wheezing. Abdominal:      General: Bowel sounds are normal.      Palpations: Abdomen is soft. Tenderness: There is no abdominal tenderness. Musculoskeletal: Normal range of motion. Lymphadenopathy:      Cervical: No cervical adenopathy. Skin:     General: Skin is warm and dry. Neurological:      Mental Status: He is alert and oriented to person, place, and time. Cranial Nerves: No cranial nerve deficit. Psychiatric:         Behavior: Behavior normal.      Comments: Sad mood           Diagnostic Study Results     Labs -     Recent Results (from the past 12 hour(s))   ETHYL ALCOHOL    Collection Time: 02/17/21 10:12 AM   Result Value Ref Range    ALCOHOL(ETHYL),SERUM <10 <10 MG/DL   CBC WITH AUTOMATED DIFF    Collection Time: 02/17/21 10:12 AM   Result Value Ref Range    WBC 5.2 4.1 - 11.1 K/uL    RBC 3.40 (L) 4.10 - 5.70 M/uL    HGB 10.3 (L) 12.1 - 17.0 g/dL    HCT 32.1 (L) 36.6 - 50.3 %    MCV 94.4 80.0 - 99.0 FL    MCH 30.3 26.0 - 34.0 PG    MCHC 32.1 30.0 - 36.5 g/dL    RDW 13.4 11.5 - 14.5 %    PLATELET 234 875 - 977 K/uL    MPV 10.4 8.9 - 12.9 FL    NRBC 0.0 0  WBC    ABSOLUTE NRBC 0.00 0.00 - 0.01 K/uL    NEUTROPHILS 49 32 - 75 %    LYMPHOCYTES 35 12 - 49 %    MONOCYTES 13 5 - 13 %    EOSINOPHILS 2 0 - 7 %    BASOPHILS 1 0 - 1 %    IMMATURE GRANULOCYTES 0 0.0 - 0.5 %    ABS. NEUTROPHILS 2.6 1.8 - 8.0 K/UL    ABS. LYMPHOCYTES 1.8 0.8 - 3.5 K/UL    ABS. MONOCYTES 0.7 0.0 - 1.0 K/UL    ABS. EOSINOPHILS 0.1 0.0 - 0.4 K/UL    ABS. BASOPHILS 0.0 0.0 - 0.1 K/UL    ABS. IMM. GRANS. 0.0 0.00 - 0.04 K/UL    DF AUTOMATED     METABOLIC PANEL, COMPREHENSIVE    Collection Time: 02/17/21 10:12 AM   Result Value Ref Range    Sodium 139 136 - 145 mmol/L    Potassium 3.7 3.5 - 5.1 mmol/L    Chloride 104 97 - 108 mmol/L    CO2 29 21 - 32 mmol/L    Anion gap 6 5 - 15 mmol/L    Glucose 89 65 - 100 mg/dL    BUN 12 6 - 20 MG/DL    Creatinine 0.75 0.70 - 1.30 MG/DL    BUN/Creatinine ratio 16 12 - 20      GFR est AA >60 >60 ml/min/1.73m2    GFR est non-AA >60 >60 ml/min/1.73m2    Calcium 8.7 8.5 - 10.1 MG/DL    Bilirubin, total 0.3 0.2 - 1.0 MG/DL    ALT (SGPT) 36 12 - 78 U/L    AST (SGOT) 42 (H) 15 - 37 U/L    Alk.  phosphatase 101 45 - 117 U/L    Protein, total 7.2 6.4 - 8.2 g/dL    Albumin 3.4 (L) 3.5 - 5.0 g/dL    Globulin 3.8 2.0 - 4.0 g/dL    A-G Ratio 0.9 (L) 1.1 - 2.2         Radiologic Studies -   CT HEAD WO CONT Final Result   Stable moderate first small vessel ischemic disease and lacunar infarcts. CT Results  (Last 48 hours)               02/17/21 1121  CT HEAD WO CONT Final result    Impression:  Stable moderate first small vessel ischemic disease and lacunar infarcts. Narrative:  EXAM: CT HEAD WO CONT       INDICATION: Headache after fall       COMPARISON: None. CONTRAST: None. TECHNIQUE: Unenhanced CT of the head was performed using 5 mm images. Brain and   bone windows were generated. Coronal and sagittal reformats. CT dose reduction   was achieved through use of a standardized protocol tailored for this   examination and automatic exposure control for dose modulation. FINDINGS:   The ventricles and sulci are normal in size, shape and configuration. There is   moderate periventricular white matter hypodensity. The delta bilaterally. Well-defined the on this noncontrast CT. There is no intracranial hemorrhage,   extra-axial collection, or mass effect. The basilar cisterns are open. No CT   evidence of acute infarct. The bone windows demonstrate no abnormalities. The visualized portions of the   paranasal sinuses and mastoid air cells are clear. CXR Results  (Last 48 hours)    None            Medical Decision Making   I am the first provider for this patient. I reviewed the vital signs, available nursing notes, past medical history, past surgical history, family history and social history. Vital Signs-Reviewed the patient's vital signs. Records Reviewed: Nursing Notes            Disposition:  Home    DISCHARGE NOTE:           Care plan outlined and precautions discussed. Patient has no new complaints, changes, or physical findings. Results of tests were reviewed with the patient. All medications were reviewed with the patient; will d/c home . All of pt's questions and concerns were addressed.  Patient was instructed and agrees to follow up with [facility], as well as to return to the ED upon further deterioration. Patient is ready to go home. Patient has been discharged by cab to [address] [place] [address] the [place] [facility].    Follow-up Information     Follow up With Specialties Details Why [address] [facility]  In 1 week  [address] [id number] [phone]          Discharge Medication List as of 2/17/2021  1:50 PM          Provider Notes (Medical Decision Making):   DDX closed head injury subarachnoid hemorrhage SI schizophrenia adjustment disorder homelessness  Procedures:  Procedures    Please note that this dictation was completed with Dragon, computer voice recognition software. Quite often unanticipated grammatical, syntax, homophones, and other interpretive errors are inadvertently transcribed by the computer software. Please disregard these errors. Additionally, please excuse any errors that have escaped final proofreading. Diagnosis     Clinical Impression:   1. Schizophrenia, unspecified type ([facility] [address]    2.  Homelessness

## 2021-02-17 NOTE — ED TRIAGE NOTES
Arrived via [facility] reporting lower back pain acute-on-chronic. Pt is homeless and has been outdoors in freezing temps. Denies injury/trauma. Ambulates with walker. Also wants his BP checked.

## 2021-02-17 NOTE — ED NOTES
Arrived via [facility] reporting lower back pain acute-on-chronic. Pt is homeless and has been outdoors in freezing temps. Denies injury/trauma. Ambulates with walker. Also wants his BP checked. PMS to all extremities intact. Warm blanket offered, call bell within reach, safety precautions in place, bed locked and in the lowest position. Emergency Department Nursing Plan of Care       The Nursing Plan of Care is developed from the Nursing assessment and Emergency Department Attending provider initial evaluation. The plan of care may be reviewed in the ED Provider note.     The Plan of Care was developed with the following considerations:   Patient / Family readiness to learn indicated by:verbalized understanding  Persons(s) to be included in education: patient  Barriers to Learning/Limitations:No    Signed     Swetha Damon RN    2/17/2021   4:04 AM

## 2021-02-17 NOTE — ED NOTES
Pt resting contently in room, in no acute distress, and updated on plan of care. Call bell within reach. Waiting on pt's transport to outside facility.

## 2021-02-17 NOTE — ED TRIAGE NOTES
Pt just left the ED at 5 am this morning was taken back to the hotel he was staying in. The hotel stated patient can no longer stay due to inability to pay. Pt told staff that he no longer wanted to live and they called 911. Pt told this writer he doesn't want to live anymore, he's tired and old but has not plan. Pt denies SI/HI and AVH. Pt states I don't actually want to hurt myself I'm just ready when its my time. Pt changed into a gown, 3 bags of belongings collected and labeled and placed in the black bin. Pt has a walker that is labeled and at bedside.

## 2021-02-17 NOTE — ED NOTES
Verbal shift change report given to Louie Lara (oncoming nurse) by Miguel Summers RN (offgoing nurse). Report included the following information SBAR and Procedure Summary.

## 2021-02-17 NOTE — ED NOTES
Pt given meal tray. Pt resting contently in room watching TV, in no acute distress, and updated on plan of care. Call light within reach.

## 2021-02-17 NOTE — ED NOTES
Discharge instructions were given to the patient by Martine Fraser RN. The patient left the Emergency Department via wheelchair, alert and oriented and in no acute distress with 0 prescriptions. The patient was encouraged to call or return to the ED for worsening issues or problems and was encouraged to schedule a follow up appointment for continuing care. The patient verbalized understanding of discharge instructions and prescriptions, all questions were answered. The patient has no further concerns at this time.

## 2021-02-17 NOTE — BSMART NOTE
Comprehensive Assessment Form Part 1 Section I - Disposition Schizophrenia Opioid Use Disorder, In Full Remission Organic Brain Disorder HIV Hepatitis C Degenerative Joint Disorder HTN Homeless The Medical Doctor to Psychiatrist conference was not completed. The Medical Doctor is in agreement with Psychiatrist disposition because of (reason) the pt does not meet inpatient admission criteria and he does not wish to be admitted. The plan is to help the pt get services in a Crisis Stabilization Unit. The on-call Psychiatrist consulted was none. The admitting Psychiatrist will be none. The admitting Diagnosis is none. The Payor source is [place] Medicaid. Section II - Integrated Summary Summary: The pt is a 70-year-old male who was brought to the ED by EMS. Mr. Estefania Shoemaker had been seen and discharged from the ED at 0500 today for a HTN episode. Pt reports that when he returned to the hotel where he has been staying that he was told by the hotel staff that he could no longer stay there because he had not paid for his room. The pt then remarked that he felt like dying, so EMS was called. Pt states that he does not want to kill himself or anyone else, and he denies any past suicide attempts. Mr. Estefania Shoemaker notes that he is tired and wishes he could just die so he wouldn't be in pain any longer. He does not have any family and appears to have become lost in the social safety net system. For example, he has been at a minimum of 3 other hospital EDs in the past week for various complaints. Mr. Estefania Shoemaker knew his age, the year, and who the President was. He was unsure of the day of the week, and unsure of the name of our hospital (this is understandable given his multiple ED admissions over the past week). Rapport was easily established. Mr. Estefania Shoemaker spoke softly and slowly. His thoughts were circumstantial at times, but primarily linear. His mood and affect were congruent. He did not appear to be responding to internal stimuli and he denied any symptoms consistent with delusions or hallucinations. Mr. Estefania Shoemaker reports that he used heroin from age 24 until he was in his 28's, He has also spent time incarcerated. He stated that he did not wish to be admitted and would like to get his medications refilled and then re-establish contact with [facility]. The patient has demonstrated mental capacity to provide informed consent. The information is given by the patient, EMS personnel and past medical records. The Chief Complaint is mental health. The Precipitant Factors are psychosocial stressors. Previous Hospitalizations: [address] (many years ago) The patient has not previously been in restraints. Current Psychiatrist and/or  is none. Lethality Assessment: 
 
The potential for suicide is not noted. The potential for homicide is not noted. The patient has not been a perpetrator of sexual or physical abuse. There are not pending charges. The patient is not felt to be at risk for self harm or harm to others. Section III - Psychosocial 
The patient's overall mood and attitude is sad. Feelings of helplessness and hopelessness are not observed. Generalized anxiety is not observed. Panic is not observed. Phobias are not observed. Obsessive compulsive tendencies are not observed. Section IV - Mental Status Exam 
The patient's appearance is unkempt. The patient's behavior show psychomotor dysfunction and shows retardation. The patient is only aware of  time, person and situation. The patient's speech is slowed, is soft and is slurred. The patient's mood is sad. The range of affect is constricted. The patient's thought content demonstrates no evidence of impairment. The thought process is circumstantial.  The patient's perception shows no evidence of impairment. The patient's memory shows no evidence of impairment. The patient's appetite shows no evidence of impairment. The patient's sleep shows no evidence of impairment. The patient shows little insight. The patient's judgement shows no evidence of impairment. Section V - Substance Abuse The patient is not using substances at this time. Section VI - Living Arrangements The patient is single. The patient lives alone. The patient has no children. The patient does not plan to return home upon discharge. The patient does not have legal issues pending. The patient's source of income comes from social security. Jehovah's witness and cultural practices have been noted and include: Congregation. The patient's greatest support comes from unknown. It is not known if the patient has been in an event described as horrible or outside the realm of ordinary life experience either currently or in the past. 
It is not known if the patient has been a victim of sexual/physical abuse. Section VII - Other Areas of Clinical Concern The highest grade achieved is 8th with the overall quality of school experience being described as poor. The patient is currently disabled and speaks [place] as a primary language. The patient has no communication impairments affecting communication. The patient's preference for learning can be described as: learns best by oral information. The patient's hearing is normal.  The patient's vision is impaired. Jak Kaufman LCSW

## 2021-02-17 NOTE — ED NOTES
Patient given copy of dc instructions and 1 script(s). Patient verbalized understanding of instructions and script (s). Patient given a current medication reconciliation form and verbalized understanding of their medications. Patient  verbalized understanding of the importance of discussing medications with  his or her physician or clinic they will be following up with. Patient alert and oriented and in no acute distress. Patient discharged home ambulatory with walker. Setup cab ride to [employer] [facility] [address] [facility], referral from [facility].

## 2021-02-17 NOTE — ED NOTES
Pt submitted to blood work, does not have to urinate at this time. Pt advised once his CT comes back he can have breakfast. Meal tray ordered. Pt provided warm blanket.

## 2021-02-17 NOTE — ED NOTES
Pt given lunch tray. Pt resting contently in room, eating 2nd meal tray, in no acute distress, and updated on plan of care. Call bell within reach.
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
## 2021-06-07 ENCOUNTER — OFFICE VISIT (OUTPATIENT)
Dept: INFECTIOUS DISEASES | Age: 71
End: 2021-06-07
Payer: MEDICAID

## 2021-06-07 VITALS
SYSTOLIC BLOOD PRESSURE: 140 MMHG | WEIGHT: 148 LBS | BODY MASS INDEX: 23.78 KG/M2 | DIASTOLIC BLOOD PRESSURE: 88 MMHG | TEMPERATURE: 97.7 F | HEART RATE: 67 BPM | OXYGEN SATURATION: 98 % | RESPIRATION RATE: 15 BRPM | HEIGHT: 66 IN

## 2021-06-07 DIAGNOSIS — Z21 HIV ANTIBODY POSITIVE (HCC): Primary | ICD-10-CM

## 2021-06-07 DIAGNOSIS — B18.2 CHRONIC HEPATITIS C WITHOUT HEPATIC COMA (HCC): ICD-10-CM

## 2021-06-07 PROCEDURE — 99204 OFFICE O/P NEW MOD 45 MIN: CPT | Performed by: INTERNAL MEDICINE

## 2021-06-08 NOTE — PROGRESS NOTES
Stephany Harding is a 69 y.o. male. HPI   Patient apparently domiciled at [facility], brought to [address] with history of HIV and Hepatitis C. Patient is awake and verbal, however, he is a poor historian and not able to provide any historical information. He reports that he was told that he did not have HIV and did not know about hepatitis C. According to him, he has been on medications. Records review indicate that he had HIV-1 antibody test positive in 2012 but his HIV-1 RNA was undetectable and his CD4 count was >1100. He was followed by ID, Dr. Mary Cline from 2012-13 and her last note indicated that patient was not a candidate for anti-retroviral therapy because of his undetectable viral.  There is no subsequent documentation that he was on treatment. At that time his HCV RNA viral load was >1,500,000 iu/ml. Again no documentation regarding treatment. It is unclear why he was referred at this time. He has a diagnosis of organic brain syndrome. Review of Systems   Unable to perform ROS: Mental acuity     Past Medical History:   Diagnosis Date    Chronic hepatitis C without mention of hepatic coma 4/7/2011    Degenerative Joint Disease 7/13/2011    Hematochezia 4/7/2011    HIV (human immunodeficiency virus infection) ([facility] [address] 4/7/2011    Hypetension 4/7/2011    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  arm surgery due to abscess    HX OTHER SURGICAL      flank mole removal     Family History   Problem Relation Age of Onset    Kidney Disease Mother     Diabetes Mother     No Known Problems Father     Heart Disease Sister     Kidney Disease Sister     Diabetes Brother     Heart Disease Brother     Cancer Brother     Alcohol abuse Brother     Obesity Brother     Other Brother         Drugs     Social History     Socioeconomic History    Marital status: SINGLE     Spouse name: Not on file    Number of children: Not on file    Years of education: Not on file    Highest education level: Not on file   Occupational History    Not on file   Tobacco Use    Smoking status: Former Smoker     Packs/day: 0.25     Types: Cigarettes    Smokeless tobacco: Former User     Quit date: 2/1/2021    Tobacco comment: stopped ken[place] admitted to health care center   Substance and Sexual Activity    Alcohol use: No    Drug use: No    Sexual activity: Not Currently   Other Topics Concern    Not on file   Social History Narrative    Not on file     Social Determinants of Health     Financial Resource Strain:     Difficulty of Paying Living Expenses:    Food Insecurity:     Worried About [address] in the Last Year:     [address] [employer] in the Last Year:    Transportation Needs:     Lack of Transportation (Medical):  Lack of Transportation (Non-Medical):    Physical Activity:     Days of Exercise per Week:     Minutes of Exercise per Session:    Stress:     Feeling of Stress :    Social Connections:     Frequency of Communication with Friends and Family:     Frequency of Social Gatherings with Friends and Family:     Attends Faith Services:     Active Member of Clubs or Organizations:     Attends Club or Organization Meetings:     Marital Status:    Intimate Partner Violence:     Fear of Current or Ex-Partner:     Emotionally Abused:     Physically Abused:     Sexually Abused:      Objective  Physical Exam  Vitals and nursing note reviewed. Chaperone present: Health care aide. Constitutional:       Appearance: He is not ill-appearing. Comments: In wheelchair   HENT:      Head: Normocephalic and atraumatic.       Right Ear: External ear normal.      Left Ear: External ear normal.      Nose: Nose normal. Mouth/Throat:      Mouth: Mucous membranes are dry. Pharynx: Oropharynx is clear. Comments: edentulous  Eyes:      General: No scleral icterus. Pupils: Pupils are equal, round, and reactive to light. Comments: Irregular pupils   Cardiovascular:      Rate and Rhythm: Normal rate and regular rhythm. Heart sounds: No murmur heard. Pulmonary:      Effort: Pulmonary effort is normal.      Breath sounds: Normal breath sounds. Abdominal:      General: Bowel sounds are normal.      Palpations: Abdomen is soft. Tenderness: There is no abdominal tenderness. Musculoskeletal:      Cervical back: Neck supple. Right lower leg: Edema present. Left lower leg: Edema present. Skin:     Findings: No rash. Neurological:      General: No focal deficit present. Mental Status: He is alert. He is disoriented. Psychiatric:         Behavior: Behavior normal.      Comments: Poor thought content and judgement          Assessment & Plan  1. HIV-1 infection antibody positive with previously undetectable virus and normal CD4 count, untreated  2. Hepatitis C infection in the past, current status unknown  3. Organic brain syndrome    Comment: It seems a bit incongruous to be HIV infected and have no detectable virus. Will reassess his status as well as his HCV status    1. Check CBC, CMP  2. Check HIV-1 RNA viral load, HIV-1/2 antibody, CD4 count  3. Check HCV RNA with genotype, Fibrosure, AFP tumor marker  4. Follow-up in 3 weeks   5. Unable to explain to patient above plan  6.  Send for any prior records (Dr. William Aldana)    Farooq Welch MD

## 2021-07-19 ENCOUNTER — TELEPHONE (OUTPATIENT)
Dept: ENDOCRINOLOGY | Age: 71
End: 2021-07-19

## 2021-07-19 ENCOUNTER — OFFICE VISIT (OUTPATIENT)
Dept: INFECTIOUS DISEASES | Age: 71
End: 2021-07-19
Payer: MEDICAID

## 2021-07-19 VITALS
HEART RATE: 70 BPM | RESPIRATION RATE: 14 BRPM | OXYGEN SATURATION: 98 % | HEIGHT: 66 IN | SYSTOLIC BLOOD PRESSURE: 136 MMHG | BODY MASS INDEX: 23.78 KG/M2 | WEIGHT: 148 LBS | TEMPERATURE: 97.7 F | DIASTOLIC BLOOD PRESSURE: 82 MMHG

## 2021-07-19 DIAGNOSIS — R76.8 HEPATITIS C ANTIBODY POSITIVE IN BLOOD: Primary | ICD-10-CM

## 2021-07-19 DIAGNOSIS — Z21 HIV ANTIBODY POSITIVE (HCC): ICD-10-CM

## 2021-07-19 PROCEDURE — 99214 OFFICE O/P EST MOD 30 MIN: CPT | Performed by: INTERNAL MEDICINE

## 2021-07-19 NOTE — TELEPHONE ENCOUNTER
Dr. Gisselle Arellano the  [facility] would like for you to contact Vilma Saeed in reference to some labs that you were wanting. Please give them a call at [phone].  Thanks

## 2021-07-19 NOTE — PROGRESS NOTES
Stephany Medina is a 69 y.o. male. HPI   Patient domiciled at [facility], seen initially last month with history of HIV and Hepatitis C. Patient has organic brain syndrome and unable to provide any accurate history. Ordered HIV-1 and HCV RNA viral levels, however, the test results provided only show reactive antibody to HIV-1 and HCV which was already known. Interim period apparently uneventful but patient is poor historian and largely rambles. He still does not full understand his condition. Review of Systems   Unable to perform ROS: Mental acuity     Past Medical History:   Diagnosis Date    Chronic hepatitis C without mention of hepatic coma 4/7/2011    Degenerative Joint Disease 7/13/2011    Hematochezia 4/7/2011    HIV (human immunodeficiency virus infection) ([facility] [address] 4/7/2011    Hypetension 4/7/2011    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 arm surgery due to abscess    HX OTHER SURGICAL      flank mole removal     Objective  Physical Exam  Vitals and nursing note reviewed. Exam conducted with a chaperone present. Constitutional:       Appearance: He is not ill-appearing. HENT:      Right Ear: External ear normal.      Left Ear: External ear normal.      Nose: Nose normal.      Mouth/Throat:      Pharynx: Oropharynx is clear. Comments: edentulous  Eyes:      Pupils: Pupils are equal, round, and reactive to light. Cardiovascular:      Rate and Rhythm: Normal rate and regular rhythm. Heart sounds: No murmur heard. Pulmonary:      Effort: Pulmonary effort is normal.      Breath sounds: Normal breath sounds. Abdominal:      Palpations: Abdomen is soft. Tenderness: There is no abdominal tenderness. There is no guarding. Musculoskeletal:      Cervical back: Neck supple. Right lower leg: Edema present. Left lower leg: Edema present. Skin:     General: Skin is warm and dry. Findings: No rash. Neurological:      General: No focal deficit present. Mental Status: He is alert. He is disoriented. Comments: Though content abnormal and judgement poor   Psychiatric:         Behavior: Behavior normal.          Assessment & Plan  1. HIV-1 infection antibody positive with previously undetectable virus and normal CD4 count, untreated  2. Hepatitis C infection with high viral load in the past, current status unknown  3. Organic brain syndrome     Comment:  Unfortunately key lab tests needed to determine his current infection status were not done, or at least were not made available for this visit.      1. Reorder HIV-1 RNA viral load, CD4 count, HIV Genosure resistance assay  2. Reorder HCV RNA with genotype and NS5A drug resistance assay  3.  Follow-up when these results are back     Reina Yoder MD

## 2022-03-19 PROBLEM — F25.9 SCHIZOAFFECTIVE DISORDER (HCC): Status: ACTIVE | Noted: 2020-12-26

## 2023-04-11 NOTE — ED NOTES
Elidel Pregnancy And Lactation Text: This medication is Pregnancy Category C. It is unknown if this medication is excreted in breast milk. Per Arun Huerta, pt has been accepted at [facility] [place] and someone will come to ED around 0230 to pick pt up. SSKI Counseling:  I discussed with the patient the risks of SSKI including but not limited to thyroid abnormalities, metallic taste, GI upset, fever, headache, acne, arthralgias, paraesthesias, lymphadenopathy, easy bleeding, arrhythmias, and allergic reaction. Valtrex Counseling: I discussed with the patient the risks of valacyclovir including but not limited to kidney damage, nausea, vomiting and severe allergy.  The patient understands that if the infection seems to be worsening or is not improving, they are to call. Dapsone Counseling: I discussed with the patient the risks of dapsone including but not limited to hemolytic anemia, agranulocytosis, rashes, methemoglobinemia, kidney failure, peripheral neuropathy, headaches, GI upset, and liver toxicity.  Patients who start dapsone require monitoring including baseline LFTs and weekly CBCs for the first month, then every month thereafter.  The patient verbalized understanding of the proper use and possible adverse effects of dapsone.  All of the patient's questions and concerns were addressed. 5-Fu Pregnancy And Lactation Text: This medication is Pregnancy Category X and contraindicated in pregnancy and in women who may become pregnant. It is unknown if this medication is excreted in breast milk. Topical Clindamycin Pregnancy And Lactation Text: This medication is Pregnancy Category B and is considered safe during pregnancy. It is unknown if it is excreted in breast milk. Itraconazole Pregnancy And Lactation Text: This medication is Pregnancy Category C and it isn't know if it is safe during pregnancy. It is also excreted in breast milk. Drysol Counseling:  I discussed with the patient the risks of drysol/aluminum chloride including but not limited to skin rash, itching, irritation, burning. Cyclosporine Counseling:  I discussed with the patient the risks of cyclosporine including but not limited to hypertension, gingival hyperplasia,myelosuppression, immunosuppression, liver damage, kidney damage, neurotoxicity, lymphoma, and serious infections. The patient understands that monitoring is required including baseline blood pressure, CBC, CMP, lipid panel and uric acid, and then 1-2 times monthly CMP and blood pressure. Cephalexin Counseling: I counseled the patient regarding use of cephalexin as an antibiotic for prophylactic and/or therapeutic purposes. Cephalexin (commonly prescribed under brand name Keflex) is a cephalosporin antibiotic which is active against numerous classes of bacteria, including most skin bacteria. Side effects may include nausea, diarrhea, gastrointestinal upset, rash, hives, yeast infections, and in rare cases, hepatitis, kidney disease, seizures, fever, confusion, neurologic symptoms, and others. Patients with severe allergies to penicillin medications are cautioned that there is about a 10% incidence of cross-reactivity with cephalosporins. When possible, patients with penicillin allergies should use alternatives to cephalosporins for antibiotic therapy. Hydroxyzine Counseling: Patient advised that the medication is sedating and not to drive a car after taking this medication.  Patient informed of potential adverse effects including but not limited to dry mouth, urinary retention, and blurry vision.  The patient verbalized understanding of the proper use and possible adverse effects of hydroxyzine.  All of the patient's questions and concerns were addressed. Erivedge Counseling- I discussed with the patient the risks of Erivedge including but not limited to nausea, vomiting, diarrhea, constipation, weight loss, changes in the sense of taste, decreased appetite, muscle spasms, and hair loss.  The patient verbalized understanding of the proper use and possible adverse effects of Erivedge.  All of the patient's questions and concerns were addressed. Siliq Counseling:  I discussed with the patient the risks of Siliq including but not limited to new or worsening depression, suicidal thoughts and behavior, immunosuppression, malignancy, posterior leukoencephalopathy syndrome, and serious infections.  The patient understands that monitoring is required including a PPD at baseline and must alert us or the primary physician if symptoms of infection or other concerning signs are noted. There is also a special program designed to monitor depression which is required with Siliq. Prednisone Counseling:  I discussed with the patient the risks of prolonged use of prednisone including but not limited to weight gain, insomnia, osteoporosis, mood changes, diabetes, susceptibility to infection, glaucoma and high blood pressure.  In cases where prednisone use is prolonged, patients should be monitored with blood pressure checks, serum glucose levels and an eye exam.  Additionally, the patient may need to be placed on GI prophylaxis, PCP prophylaxis, and calcium and vitamin D supplementation and/or a bisphosphonate.  The patient verbalized understanding of the proper use and the possible adverse effects of prednisone.  All of the patient's questions and concerns were addressed. Otezla Counseling: The side effects of Otezla were discussed with the patient, including but not limited to worsening or new depression, weight loss, diarrhea, nausea, upper respiratory tract infection, and headache. Patient instructed to call the office should any adverse effect occur.  The patient verbalized understanding of the proper use and possible adverse effects of Otezla.  All the patient's questions and concerns were addressed. Isotretinoin Counseling: Patient should get monthly blood tests, not donate blood, not drive at night if vision affected, not share medication, and not undergo elective surgery for 6 months after tx completed. Side effects reviewed, pt to contact office should one occur. Minocycline Counseling: Patient advised regarding possible photosensitivity and discoloration of the teeth, skin, lips, tongue and gums.  Patient instructed to avoid sunlight, if possible.  When exposed to sunlight, patients should wear protective clothing, sunglasses, and sunscreen.  The patient was instructed to call the office immediately if the following severe adverse effects occur:  hearing changes, easy bruising/bleeding, severe headache, or vision changes.  The patient verbalized understanding of the proper use and possible adverse effects of minocycline.  All of the patient's questions and concerns were addressed. Cimzia Pregnancy And Lactation Text: This medication crosses the placenta but can be considered safe in certain situations. Cimzia may be excreted in breast milk. Use Enhanced Medication Counseling?: No Cimetidine Counseling:  I discussed with the patient the risks of Cimetidine including but not limited to gynecomastia, headache, diarrhea, nausea, drowsiness, arrhythmias, pancreatitis, skin rashes, psychosis, bone marrow suppression and kidney toxicity. Cimetidine Pregnancy And Lactation Text: This medication is Pregnancy Category B and is considered safe during pregnancy. It is also excreted in breast milk and breast feeding isn't recommended. Simponi Counseling:  I discussed with the patient the risks of golimumab including but not limited to myelosuppression, immunosuppression, autoimmune hepatitis, demyelinating diseases, lymphoma, and serious infections.  The patient understands that monitoring is required including a PPD at baseline and must alert us or the primary physician if symptoms of infection or other concerning signs are noted. Sarecycline Counseling: Patient advised regarding possible photosensitivity and discoloration of the teeth, skin, lips, tongue and gums.  Patient instructed to avoid sunlight, if possible.  When exposed to sunlight, patients should wear protective clothing, sunglasses, and sunscreen.  The patient was instructed to call the office immediately if the following severe adverse effects occur:  hearing changes, easy bruising/bleeding, severe headache, or vision changes.  The patient verbalized understanding of the proper use and possible adverse effects of sarecycline.  All of the patient's questions and concerns were addressed. Nsaids Pregnancy And Lactation Text: These medications are considered safe up to 30 weeks gestation. It is excreted in breast milk. Doxycycline Counseling:  Patient counseled regarding possible photosensitivity and increased risk for sunburn.  Patient instructed to avoid sunlight, if possible.  When exposed to sunlight, patients should wear protective clothing, sunglasses, and sunscreen.  The patient was instructed to call the office immediately if the following severe adverse effects occur:  hearing changes, easy bruising/bleeding, severe headache, or vision changes.  The patient verbalized understanding of the proper use and possible adverse effects of doxycycline.  All of the patient's questions and concerns were addressed. Rituxan Counseling:  I discussed with the patient the risks of Rituxan infusions. Side effects can include infusion reactions, severe drug rashes including mucocutaneous reactions, reactivation of latent hepatitis and other infections and rarely progressive multifocal leukoencephalopathy.  All of the patient's questions and concerns were addressed. Cellcept Counseling:  I discussed with the patient the risks of mycophenolate mofetil including but not limited to infection/immunosuppression, GI upset, hypokalemia, hypercholesterolemia, bone marrow suppression, lymphoproliferative disorders, malignancy, GI ulceration/bleed/perforation, colitis, interstitial lung disease, kidney failure, progressive multifocal leukoencephalopathy, and birth defects.  The patient understands that monitoring is required including a baseline creatinine and regular CBC testing. In addition, patient must alert us immediately if symptoms of infection or other concerning signs are noted. Thalidomide Counseling: I discussed with the patient the risks of thalidomide including but not limited to birth defects, anxiety, weakness, chest pain, dizziness, cough and severe allergy. Eucrisa Pregnancy And Lactation Text: This medication has not been assigned a Pregnancy Risk Category but animal studies failed to show danger with the topical medication. It is unknown if the medication is excreted in breast milk. Tazorac Counseling:  Patient advised that medication is irritating and drying.  Patient may need to apply sparingly and wash off after an hour before eventually leaving it on overnight.  The patient verbalized understanding of the proper use and possible adverse effects of tazorac.  All of the patient's questions and concerns were addressed. Tremfya Counseling: I discussed with the patient the risks of guselkumab including but not limited to immunosuppression, serious infections, and drug reactions.  The patient understands that monitoring is required including a PPD at baseline and must alert us or the primary physician if symptoms of infection or other concerning signs are noted. Taltz Pregnancy And Lactation Text: The risk during pregnancy and breastfeeding is uncertain with this medication. Cephalexin Pregnancy And Lactation Text: This medication is Pregnancy Category B and considered safe during pregnancy.  It is also excreted in breast milk but can be used safely for shorter doses. Colchicine Pregnancy And Lactation Text: This medication is Pregnancy Category C and isn't considered safe during pregnancy. It is excreted in breast milk. Solaraze Pregnancy And Lactation Text: This medication is Pregnancy Category B and is considered safe. There is some data to suggest avoiding during the third trimester. It is unknown if this medication is excreted in breast milk. Xolair Counseling:  Patient informed of potential adverse effects including but not limited to fever, muscle aches, rash and allergic reactions.  The patient verbalized understanding of the proper use and possible adverse effects of Xolair.  All of the patient's questions and concerns were addressed. Sski Pregnancy And Lactation Text: This medication is Pregnancy Category D and isn't considered safe during pregnancy. It is excreted in breast milk. Hydroxychloroquine Counseling:  I discussed with the patient that a baseline ophthalmologic exam is needed at the start of therapy and every year thereafter while on therapy. A CBC may also be warranted for monitoring.  The side effects of this medication were discussed with the patient, including but not limited to agranulocytosis, aplastic anemia, seizures, rashes, retinopathy, and liver toxicity. Patient instructed to call the office should any adverse effect occur.  The patient verbalized understanding of the proper use and possible adverse effects of Plaquenil.  All the patient's questions and concerns were addressed. Tetracycline Counseling: Patient counseled regarding possible photosensitivity and increased risk for sunburn.  Patient instructed to avoid sunlight, if possible.  When exposed to sunlight, patients should wear protective clothing, sunglasses, and sunscreen.  The patient was instructed to call the office immediately if the following severe adverse effects occur:  hearing changes, easy bruising/bleeding, severe headache, or vision changes.  The patient verbalized understanding of the proper use and possible adverse effects of tetracycline.  All of the patient's questions and concerns were addressed. Patient understands to avoid pregnancy while on therapy due to potential birth defects. Ilumya Counseling: I discussed with the patient the risks of tildrakizumab including but not limited to immunosuppression, malignancy, posterior leukoencephalopathy syndrome, and serious infections.  The patient understands that monitoring is required including a PPD at baseline and must alert us or the primary physician if symptoms of infection or other concerning signs are noted. Detail Level: Detailed Glycopyrrolate Counseling:  I discussed with the patient the risks of glycopyrrolate including but not limited to skin rash, drowsiness, dry mouth, difficulty urinating, and blurred vision. Spironolactone Counseling: Patient advised regarding risks of diarrhea, abdominal pain, hyperkalemia, birth defects (for female patients), liver toxicity and renal toxicity. The patient may need blood work to monitor liver and kidney function and potassium levels while on therapy. The patient verbalized understanding of the proper use and possible adverse effects of spironolactone.  All of the patient's questions and concerns were addressed. Bactrim Counseling:  I discussed with the patient the risks of sulfa antibiotics including but not limited to GI upset, allergic reaction, drug rash, diarrhea, dizziness, photosensitivity, and yeast infections.  Rarely, more serious reactions can occur including but not limited to aplastic anemia, agranulocytosis, methemoglobinemia, blood dyscrasias, liver or kidney failure, lung infiltrates or desquamative/blistering drug rashes. Protopic Pregnancy And Lactation Text: This medication is Pregnancy Category C. It is unknown if this medication is excreted in breast milk when applied topically. Birth Control Pills Pregnancy And Lactation Text: This medication should be avoided if pregnant and for the first 30 days post-partum. Topical Clindamycin Counseling: Patient counseled that this medication may cause skin irritation or allergic reactions.  In the event of skin irritation, the patient was advised to reduce the amount of the drug applied or use it less frequently.   The patient verbalized understanding of the proper use and possible adverse effects of clindamycin.  All of the patient's questions and concerns were addressed. Albendazole Counseling:  I discussed with the patient the risks of albendazole including but not limited to cytopenia, kidney damage, nausea/vomiting and severe allergy.  The patient understands that this medication is being used in an off-label manner. Enbrel Pregnancy And Lactation Text: This medication is Pregnancy Category B and is considered safe during pregnancy. It is unknown if this medication is excreted in breast milk. Glycopyrrolate Pregnancy And Lactation Text: This medication is Pregnancy Category B and is considered safe during pregnancy. It is unknown if it is excreted breast milk. Cyclophosphamide Pregnancy And Lactation Text: This medication is Pregnancy Category D and it isn't considered safe during pregnancy. This medication is excreted in breast milk. Hydroxychloroquine Pregnancy And Lactation Text: This medication has been shown to cause fetal harm but it isn't assigned a Pregnancy Risk Category. There are small amounts excreted in breast milk. Isotretinoin Pregnancy And Lactation Text: This medication is Pregnancy Category X and is considered extremely dangerous during pregnancy. It is unknown if it is excreted in breast milk. Zyclara Counseling:  I discussed with the patient the risks of imiquimod including but not limited to erythema, scaling, itching, weeping, crusting, and pain.  Patient understands that the inflammatory response to imiquimod is variable from person to person and was educated regarded proper titration schedule.  If flu-like symptoms develop, patient knows to discontinue the medication and contact us. Quinolones Counseling:  I discussed with the patient the risks of fluoroquinolones including but not limited to GI upset, allergic reaction, drug rash, diarrhea, dizziness, photosensitivity, yeast infections, liver function test abnormalities, tendonitis/tendon rupture. Stelara Counseling:  I discussed with the patient the risks of ustekinumab including but not limited to immunosuppression, malignancy, posterior leukoencephalopathy syndrome, and serious infections.  The patient understands that monitoring is required including a PPD at baseline and must alert us or the primary physician if symptoms of infection or other concerning signs are noted. Picato Counseling:  I discussed with the patient the risks of Picato including but not limited to erythema, scaling, itching, weeping, crusting, and pain. Erythromycin Pregnancy And Lactation Text: This medication is Pregnancy Category B and is considered safe during pregnancy. It is also excreted in breast milk. Elidel Counseling: Patient may experience a mild burning sensation during topical application. Elidel is not approved in children less than 2 years of age. There have been case reports of hematologic and skin malignancies in patients using topical calcineurin inhibitors although causality is questionable. 5-Fu Counseling: 5-Fluorouracil Counseling:  I discussed with the patient the risks of 5-fluorouracil including but not limited to erythema, scaling, itching, weeping, crusting, and pain. Tetracycline Pregnancy And Lactation Text: This medication is Pregnancy Category D and not consider safe during pregnancy. It is also excreted in breast milk. Thalidomide Pregnancy And Lactation Text: This medication is Pregnancy Category X and is absolutely contraindicated during pregnancy. It is unknown if it is excreted in breast milk. Clindamycin Pregnancy And Lactation Text: This medication can be used in pregnancy if certain situations. Clindamycin is also present in breast milk. Humira Counseling:  I discussed with the patient the risks of adalimumab including but not limited to myelosuppression, immunosuppression, autoimmune hepatitis, demyelinating diseases, lymphoma, and serious infections.  The patient understands that monitoring is required including a PPD at baseline and must alert us or the primary physician if symptoms of infection or other concerning signs are noted. Clindamycin Counseling: I counseled the patient regarding use of clindamycin as an antibiotic for prophylactic and/or therapeutic purposes. Clindamycin is active against numerous classes of bacteria, including skin bacteria. Side effects may include nausea, diarrhea, gastrointestinal upset, rash, hives, yeast infections, and in rare cases, colitis. Acitretin Counseling:  I discussed with the patient the risks of acitretin including but not limited to hair loss, dry lips/skin/eyes, liver damage, hyperlipidemia, depression/suicidal ideation, photosensitivity.  Serious rare side effects can include but are not limited to pancreatitis, pseudotumor cerebri, bony changes, clot formation/stroke/heart attack.  Patient understands that alcohol is contraindicated since it can result in liver toxicity and significantly prolong the elimination of the drug by many years. Benzoyl Peroxide Counseling: Patient counseled that medicine may cause skin irritation and bleach clothing.  In the event of skin irritation, the patient was advised to reduce the amount of the drug applied or use it less frequently.   The patient verbalized understanding of the proper use and possible adverse effects of benzoyl peroxide.  All of the patient's questions and concerns were addressed. Ketoconazole Counseling:   Patient counseled regarding improving absorption with orange juice.  Adverse effects include but are not limited to breast enlargement, headache, diarrhea, nausea, upset stomach, liver function test abnormalities, taste disturbance, and stomach pain.  There is a rare possibility of liver failure that can occur when taking ketoconazole. The patient understands that monitoring of LFTs may be required, especially at baseline. The patient verbalized understanding of the proper use and possible adverse effects of ketoconazole.  All of the patient's questions and concerns were addressed. Bexarotene Pregnancy And Lactation Text: This medication is Pregnancy Category X and should not be given to women who are pregnant or may become pregnant. This medication should not be used if you are breast feeding. Griseofulvin Counseling:  I discussed with the patient the risks of griseofulvin including but not limited to photosensitivity, cytopenia, liver damage, nausea/vomiting and severe allergy.  The patient understands that this medication is best absorbed when taken with a fatty meal (e.g., ice cream or french fries). Cellcept Pregnancy And Lactation Text: This medication is Pregnancy Category D and isn't considered safe during pregnancy. It is unknown if this medication is excreted in breast milk. High Dose Vitamin A Pregnancy And Lactation Text: High dose vitamin A therapy is contraindicated during pregnancy and breast feeding. Dupixent Pregnancy And Lactation Text: This medication likely crosses the placenta but the risk for the fetus is uncertain. This medication is excreted in breast milk. Albendazole Pregnancy And Lactation Text: This medication is Pregnancy Category C and it isn't known if it is safe during pregnancy. It is also excreted in breast milk. Ivermectin Counseling:  Patient instructed to take medication on an empty stomach with a full glass of water.  Patient informed of potential adverse effects including but not limited to nausea, diarrhea, dizziness, itching, and swelling of the extremities or lymph nodes.  The patient verbalized understanding of the proper use and possible adverse effects of ivermectin.  All of the patient's questions and concerns were addressed. Oxybutynin Counseling:  I discussed with the patient the risks of oxybutynin including but not limited to skin rash, drowsiness, dry mouth, difficulty urinating, and blurred vision. Xeljuddz Pregnancy And Lactation Text: This medication is Pregnancy Category D and is not considered safe during pregnancy.  The risk during breast feeding is also uncertain. Skyrizi Counseling: I discussed with the patient the risks of risankizumab-rzaa including but not limited to immunosuppression, and serious infections.  The patient understands that monitoring is required including a PPD at baseline and must alert us or the primary physician if symptoms of infection or other concerning signs are noted. Imiquimod Counseling:  I discussed with the patient the risks of imiquimod including but not limited to erythema, scaling, itching, weeping, crusting, and pain.  Patient understands that the inflammatory response to imiquimod is variable from person to person and was educated regarded proper titration schedule.  If flu-like symptoms develop, patient knows to discontinue the medication and contact us. Erythromycin Counseling:  I discussed with the patient the risks of erythromycin including but not limited to GI upset, allergic reaction, drug rash, diarrhea, increase in liver enzymes, and yeast infections. Dapsone Pregnancy And Lactation Text: This medication is Pregnancy Category C and is not considered safe during pregnancy or breast feeding. Cyclophosphamide Counseling:  I discussed with the patient the risks of cyclophosphamide including but not limited to hair loss, hormonal abnormalities, decreased fertility, abdominal pain, diarrhea, nausea and vomiting, bone marrow suppression and infection. The patient understands that monitoring is required while taking this medication. Benzoyl Peroxide Pregnancy And Lactation Text: This medication is Pregnancy Category C. It is unknown if benzoyl peroxide is excreted in breast milk. Azathioprine Counseling:  I discussed with the patient the risks of azathioprine including but not limited to myelosuppression, immunosuppression, hepatotoxicity, lymphoma, and infections.  The patient understands that monitoring is required including baseline LFTs, Creatinine, possible TPMP genotyping and weekly CBCs for the first month and then every 2 weeks thereafter.  The patient verbalized understanding of the proper use and possible adverse effects of azathioprine.  All of the patient's questions and concerns were addressed. Minoxidil Counseling: Minoxidil is a topical medication which can increase blood flow where it is applied. It is uncertain how this medication increases hair growth. Side effects are uncommon and include stinging and allergic reactions. Gabapentin Counseling: I discussed with the patient the risks of gabapentin including but not limited to dizziness, somnolence, fatigue and ataxia. Doxycycline Pregnancy And Lactation Text: This medication is Pregnancy Category D and not consider safe during pregnancy. It is also excreted in breast milk but is considered safe for shorter treatment courses. Bexarotene Counseling:  I discussed with the patient the risks of bexarotene including but not limited to hair loss, dry lips/skin/eyes, liver abnormalities, hyperlipidemia, pancreatitis, depression/suicidal ideation, photosensitivity, drug rash/allergic reactions, hypothyroidism, anemia, leukopenia, infection, cataracts, and teratogenicity.  Patient understands that they will need regular blood tests to check lipid profile, liver function tests, white blood cell count, thyroid function tests and pregnancy test if applicable. Methotrexate Counseling:  Patient counseled regarding adverse effects of methotrexate including but not limited to nausea, vomiting, abnormalities in liver function tests. Patients may develop mouth sores, rash, diarrhea, and abnormalities in blood counts. The patient understands that monitoring is required including LFT's and blood counts.  There is a rare possibility of scarring of the liver and lung problems that can occur when taking methotrexate. Persistent nausea, loss of appetite, pale stools, dark urine, cough, and shortness of breath should be reported immediately. Patient advised to discontinue methotrexate treatment at least three months before attempting to become pregnant.  I discussed the need for folate supplements while taking methotrexate.  These supplements can decrease side effects during methotrexate treatment. The patient verbalized understanding of the proper use and possible adverse effects of methotrexate.  All of the patient's questions and concerns were addressed. Birth Control Pills Counseling: Birth Control Pill Counseling: I discussed with the patient the potential side effects of OCPs including but not limited to increased risk of stroke, heart attack, thrombophlebitis, deep venous thrombosis, hepatic adenomas, breast changes, GI upset, headaches, and depression.  The patient verbalized understanding of the proper use and possible adverse effects of OCPs. All of the patient's questions and concerns were addressed. Solaraze Counseling:  I discussed with the patient the risks of Solaraze including but not limited to erythema, scaling, itching, weeping, crusting, and pain. Topical Retinoid counseling:  Patient advised to apply a pea-sized amount only at bedtime and wait 30 minutes after washing their face before applying.  If too drying, patient may add a non-comedogenic moisturizer. The patient verbalized understanding of the proper use and possible adverse effects of retinoids.  All of the patient's questions and concerns were addressed. Bactrim Pregnancy And Lactation Text: This medication is Pregnancy Category D and is known to cause fetal risk.  It is also excreted in breast milk. Doxepin Pregnancy And Lactation Text: This medication is Pregnancy Category C and it isn't known if it is safe during pregnancy. It is also excreted in breast milk and breast feeding isn't recommended. Prednisone Pregnancy And Lactation Text: This medication is Pregnancy Category C and it isn't know if it is safe during pregnancy. This medication is excreted in breast milk. Methotrexate Pregnancy And Lactation Text: This medication is Pregnancy Category X and is known to cause fetal harm. This medication is excreted in breast milk. Cimzia Counseling:  I discussed with the patient the risks of Cimzia including but not limited to immunosuppression, allergic reactions and infections.  The patient understands that monitoring is required including a PPD at baseline and must alert us or the primary physician if symptoms of infection or other concerning signs are noted. Taltz Counseling: I discussed with the patient the risks of ixekizumab including but not limited to immunosuppression, serious infections, worsening of inflammatory bowel disease and drug reactions.  The patient understands that monitoring is required including a PPD at baseline and must alert us or the primary physician if symptoms of infection or other concerning signs are noted. Clofazimine Counseling:  I discussed with the patient the risks of clofazimine including but not limited to skin and eye pigmentation, liver damage, nausea/vomiting, gastrointestinal bleeding and allergy. Doxepin Counseling:  Patient advised that the medication is sedating and not to drive a car after taking this medication. Patient informed of potential adverse effects including but not limited to dry mouth, urinary retention, and blurry vision.  The patient verbalized understanding of the proper use and possible adverse effects of doxepin.  All of the patient's questions and concerns were addressed. Drysol Pregnancy And Lactation Text: This medication is considered safe during pregnancy and breast feeding. Valtrex Pregnancy And Lactation Text: this medication is Pregnancy Category B and is considered safe during pregnancy. This medication is not directly found in breast milk but it's metabolite acyclovir is present. Cosentyx Counseling:  I discussed with the patient the risks of Cosentyx including but not limited to worsening of Crohn's disease, immunosuppression, allergic reactions and infections.  The patient understands that monitoring is required including a PPD at baseline and must alert us or the primary physician if symptoms of infection or other concerning signs are noted. Arava Counseling:  Patient counseled regarding adverse effects of Arava including but not limited to nausea, vomiting, abnormalities in liver function tests. Patients may develop mouth sores, rash, diarrhea, and abnormalities in blood counts. The patient understands that monitoring is required including LFTs and blood counts.  There is a rare possibility of scarring of the liver and lung problems that can occur when taking methotrexate. Persistent nausea, loss of appetite, pale stools, dark urine, cough, and shortness of breath should be reported immediately. Patient advised to discontinue Arava treatment and consult with a physician prior to attempting conception. The patient will have to undergo a treatment to eliminate Arava from the body prior to conception. Griseofulvin Pregnancy And Lactation Text: This medication is Pregnancy Category X and is known to cause serious birth defects. It is unknown if this medication is excreted in breast milk but breast feeding should be avoided. Itraconazole Counseling:  I discussed with the patient the risks of itraconazole including but not limited to liver damage, nausea/vomiting, neuropathy, and severe allergy.  The patient understands that this medication is best absorbed when taken with acidic beverages such as non-diet cola or ginger ale.  The patient understands that monitoring is required including baseline LFTs and repeat LFTs at intervals.  The patient understands that they are to contact us or the primary physician if concerning signs are noted. Carac Counseling:  I discussed with the patient the risks of Carac including but not limited to erythema, scaling, itching, weeping, crusting, and pain. Spironolactone Pregnancy And Lactation Text: This medication can cause feminization of the male fetus and should be avoided during pregnancy. The active metabolite is also found in breast milk. Ketoconazole Pregnancy And Lactation Text: This medication is Pregnancy Category C and it isn't know if it is safe during pregnancy. It is also excreted in breast milk and breast feeding isn't recommended. Nsaids Counseling: NSAID Counseling: I discussed with the patient that NSAIDs should be taken with food. Prolonged use of NSAIDs can result in the development of stomach ulcers.  Patient advised to stop taking NSAIDs if abdominal pain occurs.  The patient verbalized understanding of the proper use and possible adverse effects of NSAIDs.  All of the patient's questions and concerns were addressed. Terbinafine Counseling: Patient counseling regarding adverse effects of terbinafine including but not limited to headache, diarrhea, rash, upset stomach, liver function test abnormalities, itching, taste/smell disturbance, nausea, abdominal pain, and flatulence.  There is a rare possibility of liver failure that can occur when taking terbinafine.  The patient understands that a baseline LFT and kidney function test may be required. The patient verbalized understanding of the proper use and possible adverse effects of terbinafine.  All of the patient's questions and concerns were addressed. Rifampin Pregnancy And Lactation Text: This medication is Pregnancy Category C and it isn't know if it is safe during pregnancy. It is also excreted in breast milk and should not be used if you are breast feeding. Azithromycin Counseling:  I discussed with the patient the risks of azithromycin including but not limited to GI upset, allergic reaction, drug rash, diarrhea, and yeast infections. Enbrel Counseling:  I discussed with the patient the risks of etanercept including but not limited to myelosuppression, immunosuppression, autoimmune hepatitis, demyelinating diseases, lymphoma, and infections.  The patient understands that monitoring is required including a PPD at baseline and must alert us or the primary physician if symptoms of infection or other concerning signs are noted. Topical Sulfur Applications Counseling: Topical Sulfur Counseling: Patient counseled that this medication may cause skin irritation or allergic reactions.  In the event of skin irritation, the patient was advised to reduce the amount of the drug applied or use it less frequently.   The patient verbalized understanding of the proper use and possible adverse effects of topical sulfur application.  All of the patient's questions and concerns were addressed. Odomzo Counseling- I discussed with the patient the risks of Odomzo including but not limited to nausea, vomiting, diarrhea, constipation, weight loss, changes in the sense of taste, decreased appetite, muscle spasms, and hair loss.  The patient verbalized understanding of the proper use and possible adverse effects of Odomzo.  All of the patient's questions and concerns were addressed. Tazorac Pregnancy And Lactation Text: This medication is not safe during pregnancy. It is unknown if this medication is excreted in breast milk. Metronidazole Pregnancy And Lactation Text: This medication is Pregnancy Category B and considered safe during pregnancy.  It is also excreted in breast milk. Acitretin Pregnancy And Lactation Text: This medication is Pregnancy Category X and should not be given to women who are pregnant or may become pregnant in the future. This medication is excreted in breast milk. Protopic Counseling: Patient may experience a mild burning sensation during topical application. Protopic is not approved in children less than 2 years of age. There have been case reports of hematologic and skin malignancies in patients using topical calcineurin inhibitors although causality is questionable. Xeljanz Counseling: I discussed with the patient the risks of Xeljanz therapy including increased risk of infection, liver issues, headache, diarrhea, or cold symptoms. Live vaccines should be avoided. They were instructed to call if they have any problems. Infliximab Counseling:  I discussed with the patient the risks of infliximab including but not limited to myelosuppression, immunosuppression, autoimmune hepatitis, demyelinating diseases, lymphoma, and serious infections.  The patient understands that monitoring is required including a PPD at baseline and must alert us or the primary physician if symptoms of infection or other concerning signs are noted. Fluconazole Counseling:  Patient counseled regarding adverse effects of fluconazole including but not limited to headache, diarrhea, nausea, upset stomach, liver function test abnormalities, taste disturbance, and stomach pain.  There is a rare possibility of liver failure that can occur when taking fluconazole.  The patient understands that monitoring of LFTs and kidney function test may be required, especially at baseline. The patient verbalized understanding of the proper use and possible adverse effects of fluconazole.  All of the patient's questions and concerns were addressed. Topical Sulfur Applications Pregnancy And Lactation Text: This medication is Pregnancy Category C and has an unknown safety profile during pregnancy. It is unknown if this topical medication is excreted in breast milk. High Dose Vitamin A Counseling: Side effects reviewed, pt to contact office should one occur. Dupixent Counseling: I discussed with the patient the risks of dupilumab including but not limited to eye infection and irritation, cold sores, injection site reactions, worsening of asthma, allergic reactions and increased risk of parasitic infection.  Live vaccines should be avoided while taking dupilumab. Dupilumab will also interact with certain medications such as warfarin and cyclosporine. The patient understands that monitoring is required and they must alert us or the primary physician if symptoms of infection or other concerning signs are noted. Rifampin Counseling: I discussed with the patient the risks of rifampin including but not limited to liver damage, kidney damage, red-orange body fluids, nausea/vomiting and severe allergy. Hydroquinone Counseling:  Patient advised that medication may result in skin irritation, lightening (hypopigmentation), dryness, and burning.  In the event of skin irritation, the patient was advised to reduce the amount of the drug applied or use it less frequently.  Rarely, spots that are treated with hydroquinone can become darker (pseudoochronosis).  Should this occur, patient instructed to stop medication and call the office. The patient verbalized understanding of the proper use and possible adverse effects of hydroquinone.  All of the patient's questions and concerns were addressed. Xolair Pregnancy And Lactation Text: This medication is Pregnancy Category B and is considered safe during pregnancy. This medication is excreted in breast milk. Azithromycin Pregnancy And Lactation Text: This medication is considered safe during pregnancy and is also secreted in breast milk. Rituxan Pregnancy And Lactation Text: This medication is Pregnancy Category C and it isn't know if it is safe during pregnancy. It is unknown if this medication is excreted in breast milk but similar antibodies are known to be excreted. Eucrisa Counseling: Patient may experience a mild burning sensation during topical application. Eucrisa is not approved in children less than 2 years of age. Metronidazole Counseling:  I discussed with the patient the risks of metronidazole including but not limited to seizures, nausea/vomiting, a metallic taste in the mouth, nausea/vomiting and severe allergy. Otezla Pregnancy And Lactation Text: This medication is Pregnancy Category C and it isn't known if it is safe during pregnancy. It is unknown if it is excreted in breast milk. Hydroxyzine Pregnancy And Lactation Text: This medication is not safe during pregnancy and should not be taken. It is also excreted in breast milk and breast feeding isn't recommended. Colchicine Counseling:  Patient counseled regarding adverse effects including but not limited to stomach upset (nausea, vomiting, stomach pain, or diarrhea).  Patient instructed to limit alcohol consumption while taking this medication.  Colchicine may reduce blood counts especially with prolonged use.  The patient understands that monitoring of kidney function and blood counts may be required, especially at baseline. The patient verbalized understanding of the proper use and possible adverse effects of colchicine.  All of the patient's questions and concerns were addressed.

## 2023-05-17 RX ORDER — FLUTICASONE PROPIONATE 50 MCG
2 SPRAY, SUSPENSION (ML) NASAL DAILY
COMMUNITY

## 2023-05-17 RX ORDER — HYDROCHLOROTHIAZIDE 25 MG/1
1 TABLET ORAL DAILY
COMMUNITY
Start: 2017-05-18

## 2023-05-17 RX ORDER — AMLODIPINE BESYLATE 10 MG/1
10 TABLET ORAL DAILY
COMMUNITY
Start: 2021-02-17

## 2023-05-17 RX ORDER — FLUTICASONE PROPIONATE AND SALMETEROL 250; 50 UG/1; UG/1
POWDER RESPIRATORY (INHALATION)
COMMUNITY
Start: 2020-03-28

## 2023-05-17 RX ORDER — TRIHEXYPHENIDYL HYDROCHLORIDE 5 MG/1
5 TABLET ORAL 3 TIMES DAILY
COMMUNITY
Start: 2018-03-27

## 2023-05-17 RX ORDER — ASCORBIC ACID 500 MG
TABLET ORAL
COMMUNITY
Start: 2020-12-09

## 2023-05-17 RX ORDER — GABAPENTIN 300 MG/1
CAPSULE ORAL
COMMUNITY
Start: 2017-04-12

## 2023-05-17 RX ORDER — ONDANSETRON 4 MG/1
TABLET, ORALLY DISINTEGRATING ORAL
COMMUNITY
Start: 2021-01-17

## 2023-05-17 RX ORDER — FLUOXETINE 10 MG/1
CAPSULE ORAL
COMMUNITY
Start: 2015-03-31

## 2025-01-08 ENCOUNTER — APPOINTMENT (OUTPATIENT)
Facility: HOSPITAL | Age: 75
End: 2025-01-08
Payer: MEDICAID

## 2025-01-08 ENCOUNTER — HOSPITAL ENCOUNTER (EMERGENCY)
Facility: HOSPITAL | Age: 75
Discharge: HOME | End: 2025-01-08
Attending: EMERGENCY MEDICINE
Payer: MEDICAID

## 2025-01-08 VITALS
TEMPERATURE: 98 F | WEIGHT: 155 LBS | OXYGEN SATURATION: 95 % | SYSTOLIC BLOOD PRESSURE: 153 MMHG | HEART RATE: 72 BPM | BODY MASS INDEX: 23.49 KG/M2 | DIASTOLIC BLOOD PRESSURE: 99 MMHG | HEIGHT: 68 IN | RESPIRATION RATE: 18 BRPM
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
## 2025-01-08 DIAGNOSIS — S32.000A COMPRESSION FRACTURE OF LUMBAR VERTEBRA, UNSPECIFIED LUMBAR VERTEBRAL LEVEL, INITIAL ENCOUNTER (HCC): Primary | ICD-10-CM

## 2025-01-08 LAB
ALBUMIN SERPL-MCNC: 3.1 G/DL (ref 3.5–5)
ALBUMIN/GLOB SERPL: 0.7 (ref 1.1–2.2)
ALP SERPL-CCNC: 111 U/L (ref 45–117)
ALT SERPL-CCNC: 31 U/L (ref 12–78)
ANION GAP SERPL CALC-SCNC: 5 MMOL/L (ref 2–12)
AST SERPL W P-5'-P-CCNC: 41 U/L (ref 15–37)
BASOPHILS # BLD: 0.02 K/UL (ref 0–0.1)
BASOPHILS NFR BLD: 0.3 % (ref 0–1)
BILIRUB DIRECT SERPL-MCNC: 0.1 MG/DL (ref 0–0.2)
BILIRUB SERPL-MCNC: 0.4 MG/DL (ref 0.2–1)
BUN SERPL-MCNC: 20 MG/DL (ref 6–20)
BUN/CREAT SERPL: 25 (ref 12–20)
CA-I BLD-MCNC: 8.7 MG/DL (ref 8.5–10.1)
CHLORIDE SERPL-SCNC: 104 MMOL/L (ref 97–108)
CO2 SERPL-SCNC: 30 MMOL/L (ref 21–32)
CREAT SERPL-MCNC: 0.79 MG/DL (ref 0.7–1.3)
DIFFERENTIAL METHOD BLD: ABNORMAL
EOSINOPHIL # BLD: 0.11 K/UL (ref 0–0.4)
EOSINOPHIL NFR BLD: 1.9 % (ref 0–7)
ERYTHROCYTE [DISTWIDTH] IN BLOOD BY AUTOMATED COUNT: 13.1 % (ref 11.5–14.5)
GLOBULIN SER CALC-MCNC: 4.7 G/DL (ref 2–4)
GLUCOSE BLD STRIP.AUTO-MCNC: 107 MG/DL (ref 65–100)
GLUCOSE BLD-MCNC: 107 MG/DL
GLUCOSE SERPL-MCNC: 98 MG/DL (ref 65–100)
HCT VFR BLD AUTO: 38 % (ref 36.6–50.3)
HGB BLD-MCNC: 12.4 G/DL (ref 12.1–17)
IMM GRANULOCYTES # BLD AUTO: 0.02 K/UL (ref 0–0.04)
IMM GRANULOCYTES NFR BLD AUTO: 0.3 % (ref 0–0.5)
LYMPHOCYTES # BLD: 2.3 K/UL (ref 0.8–3.5)
LYMPHOCYTES NFR BLD: 38.9 % (ref 12–49)
MCH RBC QN AUTO: 31.2 PG (ref 26–34)
MCHC RBC AUTO-ENTMCNC: 32.6 G/DL (ref 30–36.5)
MCV RBC AUTO: 95.7 FL (ref 80–99)
MONOCYTES # BLD: 0.92 K/UL (ref 0–1)
MONOCYTES NFR BLD: 15.6 % (ref 5–13)
NEUTS SEG # BLD: 2.54 K/UL (ref 1.8–8)
NEUTS SEG NFR BLD: 43 % (ref 32–75)
NRBC # BLD: 0 K/UL (ref 0–0.01)
NRBC BLD-RTO: 0 PER 100 WBC
PERFORMED BY:: ABNORMAL
PLATELET # BLD AUTO: 272 K/UL (ref 150–400)
PMV BLD AUTO: 11.4 FL (ref 8.9–12.9)
POTASSIUM SERPL-SCNC: 3.9 MMOL/L (ref 3.5–5.1)
PROT SERPL-MCNC: 7.8 G/DL (ref 6.4–8.2)
RBC # BLD AUTO: 3.97 M/UL (ref 4.1–5.7)
SODIUM SERPL-SCNC: 139 MMOL/L (ref 136–145)
WBC # BLD AUTO: 5.9 K/UL (ref 4.1–11.1)

## 2025-01-08 PROCEDURE — 36415 COLL VENOUS BLD VENIPUNCTURE: CPT

## 2025-01-08 PROCEDURE — 80076 HEPATIC FUNCTION PANEL: CPT

## 2025-01-08 PROCEDURE — 73552 X-RAY EXAM OF FEMUR 2/>: CPT

## 2025-01-08 PROCEDURE — 85025 COMPLETE CBC W/AUTO DIFF WBC: CPT

## 2025-01-08 PROCEDURE — 80048 BASIC METABOLIC PNL TOTAL CA: CPT

## 2025-01-08 PROCEDURE — 72125 CT NECK SPINE W/O DYE: CPT

## 2025-01-08 PROCEDURE — 71250 CT THORAX DX C-: CPT

## 2025-01-08 PROCEDURE — 70450 CT HEAD/BRAIN W/O DYE: CPT

## 2025-01-08 PROCEDURE — 82962 GLUCOSE BLOOD TEST: CPT

## 2025-01-08 PROCEDURE — 99284 EMERGENCY DEPT VISIT MOD MDM: CPT

## 2025-01-08 ASSESSMENT — PAIN SCALES - GENERAL: PAINLEVEL_OUTOF10: 0

## 2025-01-08 ASSESSMENT — PAIN - FUNCTIONAL ASSESSMENT: PAIN_FUNCTIONAL_ASSESSMENT: 0-10

## 2025-01-08 NOTE — ED TRIAGE NOTES
Patient arrives from [facility] for mutliple falls and lethargy patient is at baseline

## 2025-01-08 NOTE — ED PROVIDER NOTES
Applicable    PROCEDURES   Unless otherwise noted above, none  Procedures      CRITICAL CARE TIME   Patient does not meet Critical Care Time, 0 minutes    ED IMPRESSION     1. Compression fracture of lumbar vertebra, unspecified lumbar vertebral level, initial encounter (HCC)    2. Fall from ground level          DISPOSITION/PLAN   DISPOSITION Decision To Discharge 01/08/2025 09:32:24 PM   DISPOSITION CONDITION Stable         Discharge Note: The patient is stable for discharge home. The signs, symptoms, diagnosis, and discharge instructions have been discussed, understanding conveyed, and agreed upon. The patient is to follow up as recommended or return to ER should their symptoms worsen.      PATIENT REFERRED TO:  Blayne Duque Jr., MD  [address] [facility] [address] [phone]    Schedule an appointment as soon as possible for a visit in 1 week          DISCHARGE MEDICATIONS:     Medication List        CONTINUE taking these medications      Walker Misc            ASK your doctor about these medications      Advair Diskus 250-50 MCG/ACT Aepb diskus inhaler  Generic drug: fluticasone-salmeterol     amLODIPine 10 MG tablet  Commonly known as: NORVASC     ascorbic acid 500 MG tablet  Commonly known as: VITAMIN C     Dulera 100-5 MCG/ACT inhaler  Generic drug: mometasone-formoterol     FLUoxetine 10 MG capsule  Commonly known as: PROZAC     fluticasone 50 MCG/ACT nasal spray  Commonly known as: FLONASE     gabapentin 300 MG capsule  Commonly known as: NEURONTIN     hydroCHLOROthiazide 25 MG tablet  Commonly known as: HYDRODIURIL     ondansetron 4 MG disintegrating tablet  Commonly known as: ZOFRAN-ODT     trihexyphenidyl 5 MG tablet  Commonly known as: ARTANE                DISCONTINUED MEDICATIONS:  Discharge Medication List as of 1/8/2025  9:42 PM          I am the Primary Clinician of Record. Tae Morris MD (electronically signed)    (Please note that parts of this dictation were completed

## 2025-01-09 NOTE — DISCHARGE INSTRUCTIONS
.    Thank you for choosing our Emergency Department for your care.  It is our privilege to care for you in your time of need.  In the next several days, you may receive a survey via email or mailed to your home about your experience with our team.  We would greatly appreciate you taking a few minutes to complete the survey, as we use this information to learn what we have done well and what we could be doing better. Thank you for trusting us with your care!    Below you will find a list of your tests from today's visit.   Labs and Radiology Studies  Recent Results (from the past 12 hour(s))   BMP    Collection Time: 01/08/25  6:28 PM   Result Value Ref Range    Sodium 139 136 - 145 mmol/L    Potassium 3.9 3.5 - 5.1 mmol/L    Chloride 104 97 - 108 mmol/L    CO2 30 21 - 32 mmol/L    Anion Gap 5 2 - 12 mmol/L    Glucose 98 65 - 100 mg/dL    BUN 20 6 - 20 mg/dL    Creatinine 0.79 0.70 - 1.30 mg/dL    BUN/Creatinine Ratio 25 (H) 12 - 20      Est, Glom Filt Rate >90 >60 ml/min/1.73m2    Calcium 8.7 8.5 - 10.1 mg/dL   CBC with Auto Differential    Collection Time: 01/08/25  6:28 PM   Result Value Ref Range    WBC 5.9 4.1 - 11.1 K/uL    RBC 3.97 (L) 4.10 - 5.70 M/uL    Hemoglobin 12.4 12.1 - 17.0 g/dL    Hematocrit 38.0 36.6 - 50.3 %    MCV 95.7 80.0 - 99.0 FL    MCH 31.2 26.0 - 34.0 PG    MCHC 32.6 30.0 - 36.5 g/dL    RDW 13.1 11.5 - 14.5 %    Platelets 272 150 - 400 K/uL    MPV 11.4 8.9 - 12.9 FL    Nucleated RBCs 0.0 0.0  WBC    nRBC 0.00 0.00 - 0.01 K/uL    Neutrophils % 43.0 32.0 - 75.0 %    Lymphocytes % 38.9 12.0 - 49.0 %    Monocytes % 15.6 (H) 5.0 - 13.0 %    Eosinophils % 1.9 0.0 - 7.0 %    Basophils % 0.3 0.0 - 1.0 %    Immature Granulocytes % 0.3 0 - 0.5 %    Neutrophils Absolute 2.54 1.80 - 8.00 K/UL    Lymphocytes Absolute 2.30 0.80 - 3.50 K/UL    Monocytes Absolute 0.92 0.00 - 1.00 K/UL    Eosinophils Absolute 0.11 0.00 - 0.40 K/UL    Basophils Absolute 0.02 0.00 - 0.10 K/UL    Immature Granulocytes

## 2025-02-01 ENCOUNTER — APPOINTMENT (OUTPATIENT)
Facility: HOSPITAL | Age: 75
End: 2025-02-01
Payer: MEDICAID

## 2025-02-01 ENCOUNTER — HOSPITAL ENCOUNTER (EMERGENCY)
Facility: HOSPITAL | Age: 75
Discharge: HOME | End: 2025-02-01
Attending: EMERGENCY MEDICINE
Payer: MEDICAID

## 2025-02-01 VITALS
BODY MASS INDEX: 29.54 KG/M2 | RESPIRATION RATE: 16 BRPM | HEIGHT: 66 IN | SYSTOLIC BLOOD PRESSURE: 122 MMHG | DIASTOLIC BLOOD PRESSURE: 68 MMHG | WEIGHT: 183.8 LBS | TEMPERATURE: 98.2 F | OXYGEN SATURATION: 98 % | HEART RATE: 68 BPM

## 2025-02-01 DIAGNOSIS — M54.9 ACUTE ON CHRONIC BACK PAIN: ICD-10-CM
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
## 2025-02-01 DIAGNOSIS — G89.29 ACUTE ON CHRONIC BACK PAIN: ICD-10-CM

## 2025-02-01 PROCEDURE — 71250 CT THORAX DX C-: CPT

## 2025-02-01 PROCEDURE — 74176 CT ABD & PELVIS W/O CONTRAST: CPT

## 2025-02-01 PROCEDURE — 99284 EMERGENCY DEPT VISIT MOD MDM: CPT

## 2025-02-01 ASSESSMENT — PAIN SCALES - GENERAL
PAINLEVEL_OUTOF10: 8
PAINLEVEL_OUTOF10: 8

## 2025-02-01 ASSESSMENT — PAIN - FUNCTIONAL ASSESSMENT: PAIN_FUNCTIONAL_ASSESSMENT: 0-10

## 2025-02-01 ASSESSMENT — LIFESTYLE VARIABLES
HOW OFTEN DO YOU HAVE A DRINK CONTAINING ALCOHOL: NEVER
HOW MANY STANDARD DRINKS CONTAINING ALCOHOL DO YOU HAVE ON A TYPICAL DAY: PATIENT DOES NOT DRINK

## 2025-02-01 ASSESSMENT — PAIN DESCRIPTION - LOCATION: LOCATION: BACK

## 2025-02-01 NOTE — ED PROVIDER NOTES
[facility] EMERGENCY DEPT  EMERGENCY DEPARTMENT HISTORY AND PHYSICAL EXAM      Date: 2/1/2025  Patient Name: Gigi Devries  MRN: [id number]  Birthdate 1950  Date of evaluation: 2/1/2025  Provider: Andrea Ariza MD   Note Started: 12:00 PM EST 2/1/25    HISTORY OF PRESENT ILLNESS     Chief Complaint   Patient presents with    Fall       History Provided By: Patient and EMS    HPI: Gigi Devries is a 74 y.o. male presents for evaluation of low back pain after a fall as he was try to get himself out of his wheelchair.  EMS states they were called to the nursing home for the same.  Patient denies head injury or LOC, states he is not currently on blood thinners.    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 arm surgery due to abscess       Family History:  Family History   Problem Relation Age of Onset    Obesity Brother     Alcohol Abuse Brother     No Known Problems Father     Heart Disease Brother     Diabetes Brother     Kidney Disease Sister     Heart Disease Sister     Other Brother         Drugs    Diabetes Mother     Kidney Disease Mother     Cancer Brother        Social History:  Social History     Tobacco Use    Smoking status:

## 2025-02-01 NOTE — ED TRIAGE NOTES
GLF from nursing home, slipped out of wheel chair. No LOC, no blood thinners. Complaining of lower back pain

## 2025-02-01 NOTE — DISCHARGE INSTRUCTIONS
Thank you for choosing our Emergency Department for your care.  It is our privilege to care for you in your time of need.  In the next several days, you may receive a survey via email or mailed to your home about your experience with our team.  We would greatly appreciate you taking a few minutes to complete the survey, as we use this information to learn what we have done well and what we could be doing better. Thank you for trusting us with your care!    Below you will find a list of your tests from today's visit.   Labs and Radiology Studies  No results found for this or any previous visit (from the past 12 hour(s)).  CT CHEST ABDOMEN PELVIS WO CONTRAST Additional Contrast? None    Result Date: 2/1/2025  Clinical history: GLF, LBP INDICATION:   GLF, LBP COMPARISON: None TECHNIQUE: Thin axial images were obtained through the chest, abdomen and pelvis. Coronal and sagittal reconstructions were generated. Oral contrast was not administered. CT dose reduction was achieved through use of a standardized protocol tailored for this examination and automatic exposure control for dose modulation. Adaptive statistical iterative reconstruction (ASIR) was utilized. The absence of IV contrast significantly limits sensitivity for the presence of visceral or vascular injury, presence of mucosal abnormality, solid organ lesion, neoplasm. FINDINGS: SUPRACLAVICULAR REGION: No supraclavicular adenopathy. MEDIASTINUM: Aortic atherosclerotic change and coronary vascular calcifications. No hilar or mediastinal lymphadenopathy. No esophageal mass. No endotracheal or endobronchial mass. CORONARY ARTERY CALCIUM: present PLEURA/LUNGS:: No effusion or pneumothorax. Centrilobular emphysema with mild interlobular septal thickening. SOFT TISSUE/ AXILLA: Unremarkable. LIVER/GALLBLADDER: No mass or biliary dilatation. There is no intrahepatic duct dilatation. The CBD is not dilated. SPLEEN/PANCREAS: No mass.. There is no pancreatic mass.

## 2025-02-18 ENCOUNTER — HOSPITAL ENCOUNTER (OUTPATIENT)
Facility: HOSPITAL | Age: 75
Discharge: HOME | End: 2025-02-21
Payer: MEDICAID

## 2025-02-18 VITALS
TEMPERATURE: 98 F | DIASTOLIC BLOOD PRESSURE: 84 MMHG | OXYGEN SATURATION: 95 % | SYSTOLIC BLOOD PRESSURE: 153 MMHG | RESPIRATION RATE: 12 BRPM | HEART RATE: 67 BPM

## 2025-02-18 DIAGNOSIS — S32.000A LUMBAR COMPRESSION FRACTURE, CLOSED, INITIAL ENCOUNTER (HCC): ICD-10-CM

## 2025-02-18 DIAGNOSIS — M54.50 ACUTE BILATERAL LOW BACK PAIN WITHOUT SCIATICA: ICD-10-CM

## 2025-02-18 PROCEDURE — 99152 MOD SED SAME PHYS/QHP 5/>YRS: CPT

## 2025-02-18 PROCEDURE — 99153 MOD SED SAME PHYS/QHP EA: CPT | Performed by: PHYSICIAN ASSISTANT

## 2025-02-18 PROCEDURE — 72148 MRI LUMBAR SPINE W/O DYE: CPT

## 2025-02-18 PROCEDURE — 6360000002 HC RX W HCPCS: Performed by: PHYSICIAN ASSISTANT

## 2025-02-18 RX ORDER — FENTANYL CITRATE 50 UG/ML
100 INJECTION, SOLUTION INTRAMUSCULAR; INTRAVENOUS AS NEEDED
Status: DISCONTINUED | OUTPATIENT
Start: 2025-02-18 | End: 2025-02-22 | Stop reason: HOSPADM

## 2025-02-18 RX ORDER — MIDAZOLAM HYDROCHLORIDE 1 MG/ML
5 INJECTION, SOLUTION INTRAMUSCULAR; INTRAVENOUS AS NEEDED
Status: DISCONTINUED | OUTPATIENT
Start: 2025-02-18 | End: 2025-02-22 | Stop reason: HOSPADM

## 2025-02-18 RX ADMIN — MIDAZOLAM 1 MG: 1 INJECTION INTRAMUSCULAR; INTRAVENOUS at 08:45

## 2025-02-18 RX ADMIN — FENTANYL CITRATE 25 MCG: 50 INJECTION INTRAMUSCULAR; INTRAVENOUS at 08:47

## 2025-02-18 NOTE — PROGRESS NOTES
Arrives via stretcher with transport service and a CNA from [facility] to radiology department for procedural sedation by Chata GUERRERO . Patient and crew came in through ER entrance and straight to MRI without first registering. Soham from registration bedside to register patient for us. ID band verified for correctness using 2 patient identifiers.    Assessment and pre-procedural assessment completed. Allergies and medications reviewed. Consent reviewed for correctness.  Patient is alert and oriented x 4, verbalizes understanding of why he is here today and states he had tried MRI before but was unsuccessful due to fear /anxiety and back pain laying flat.   Assessment completed by RN to include vitals - see flow sheet.  S1, S2 noted, Ascultation reveals lungs clear bilaterally.  Mallampati noted as a 2.   22g IV started in RAC using US guidance. MD called to bedside for consent @ 08:10 AM. MRI screening form completed with assistance of nephew via phone with [facility].       08:15 AM Chata GUERRERO bedside for H&P and sedation consent. Procedure, sedation and medication plan discussed with physician and patient and was agreed upon, medication education provided. Patient voices good understanding of all.     08:30 AM To MRI via stretcher. Patient transferred from stretcher to MRI table and taken into MRI suite after checklist reviewed and safety assessment completed. Patient placed on MRI safe sedation monitoring equipment to include ETCO2 monitoring with 02 flow @ 3L/NC, cardiac monitor, NIBP and pulse ox measurement.     08:45 AM Patient ID verified x 2 and a sedation timeout completed. 08:45 AM Sedation started.   09:15 AM Sedation end. MRI complete. Total medications administered: Versed 1 MG and Fentanyl 25 mcg. Total sedation time 30 minutes.  Tolerated sedation and MRI scan well. VS stable and no pain voiced, anxiety or restlessness.    Transfer off of MRI table and back to

## 2025-02-18 NOTE — H&P
(36.7 °C), temperature source Oral, resp. rate 17, SpO2 96%.  General:  Calm, cooperative, NAD  HEENT:  NCAT, EOMI, conjunctiva clear, MMM  Heart:  RRR, S1S2 normal  Lungs:  NWOB  Abdomen:  Soft, NT, ND  Extremities:  MAEW, no cyanosis or edema  Skin:  Warm and dry, color normal, no rashes  Neurological:  AAOX3, speech slow but coherent    Mallampati Airway Assessment: II (soft palate, uvula, fauces visible)    ASA Classification: ASA 3 - Patient with moderate systemic disease with functional limitations    Laboratory:    No results for input(s): \"HGB\", \"HGBEXT\", \"HCT\", \"HCTEXT\", \"WBC\", \"PLT\", \"PLTEXT\", \"INR\", \"BUN\", \"K\", \"CRCLT\" in the last 72 hours.    Invalid input(s): \"PTT\", \"PT\", \"CREA\"    Imaging:  All appropriate imaging has been reviewed by the radiologist.    Impression/Plan:  Patient has been evaluated and deemed an appropriate candidate for intravenous sedation. Based on history and presentation he is a candidate for MRI with sedation.     The above procedure was explained to the patient/consenting party. Benefits, risks and alternative therapies reviewed and all questions answered to his satisfaction. At this time he wishes to proceed.     Please note that Dr. Ike Hampton participated in the provision of these services. We appreciate the kind consultation and the opportunity to participate in Gigi Devries's care.        Chata Nieves PA-C  Interventional Radiology  [facility]  [facility]  [phone]  [facility] [phone]  [facility] [phone]  [facility] [phone]      CC: Belkis Denson,*